# Patient Record
Sex: FEMALE | Race: WHITE | NOT HISPANIC OR LATINO | Employment: FULL TIME | URBAN - METROPOLITAN AREA
[De-identification: names, ages, dates, MRNs, and addresses within clinical notes are randomized per-mention and may not be internally consistent; named-entity substitution may affect disease eponyms.]

---

## 2017-01-30 DIAGNOSIS — Z30.41 ENCOUNTER FOR SURVEILLANCE OF CONTRACEPTIVE PILLS: Primary | ICD-10-CM

## 2017-01-30 NOTE — TELEPHONE ENCOUNTER
norethindrone-ethinyl estradiol-iron (MICROGESTIN FE1.5/30) 1.5-30 MG-MCG per tablet  Last Written Prescription Date: 2/1/2016.  Last Fill Quantity: 84, # refills: 4  Last Office Visit with [facility], [facility] or [facility] prescribing provider: 3/28/2016.       BP Readings from Last 3 Encounters:   04/14/16 127/74   03/28/16 112/64   10/22/15 117/72     Date of last Breast Exam: ?

## 2017-02-01 NOTE — TELEPHONE ENCOUNTER
Junel FE 1.5 MG-30 MCG Tablet      Last Written Prescription Date:  1/4/17  Last Fill Quantity: 84,   # refills: 4  Last Office Visit with G, [facility] or [facility] prescribing provider: 3/28/16  Future Office visit:       Routing refill request to provider for review/approval because:  Drug not active on patient's medication list-pt reported    This was not called in by patient it was sent by pharmacy via fax

## 2017-02-02 RX ORDER — NORETHINDRONE ACETATE AND ETHINYL ESTRADIOL 1.5-30(21)
1 KIT ORAL DAILY
Qty: 84 TABLET | Refills: 4 | Status: CANCELLED | OUTPATIENT
Start: 2017-02-02

## 2017-04-14 ENCOUNTER — OFFICE VISIT (OUTPATIENT)
Dept: OBGYN | Facility: CLINIC | Age: 27
End: 2017-04-14
Payer: COMMERCIAL

## 2017-04-14 VITALS
HEART RATE: 65 BPM | BODY MASS INDEX: 24.24 KG/M2 | SYSTOLIC BLOOD PRESSURE: 110 MMHG | DIASTOLIC BLOOD PRESSURE: 77 MMHG | TEMPERATURE: 98.5 F | WEIGHT: 142 LBS | HEIGHT: 64 IN

## 2017-04-14 DIAGNOSIS — R87.612 PAPANICOLAOU SMEAR OF CERVIX WITH LOW GRADE SQUAMOUS INTRAEPITHELIAL LESION (LGSIL): ICD-10-CM

## 2017-04-14 DIAGNOSIS — Z30.41 ENCOUNTER FOR SURVEILLANCE OF CONTRACEPTIVE PILLS: ICD-10-CM

## 2017-04-14 DIAGNOSIS — Z01.419 ENCOUNTER FOR WELL WOMAN EXAM WITH ROUTINE GYNECOLOGICAL EXAM: Primary | ICD-10-CM

## 2017-04-14 PROCEDURE — 99212 OFFICE O/P EST SF 10 MIN: CPT | Performed by: OBSTETRICS & GYNECOLOGY

## 2017-04-14 RX ORDER — NORETHINDRONE ACETATE AND ETHINYL ESTRADIOL 1.5-30(21)
1 KIT ORAL DAILY
Qty: 84 TABLET | Refills: 4 | Status: SHIPPED | OUTPATIENT
Start: 2017-04-14 | End: 2018-04-11

## 2017-04-14 NOTE — PROGRESS NOTES
Stacey is a 26 year old  female who presents for annual exam.     Menses are regular 3 months on birth control pills. and normal lasting 7 days.  Menses flow: normal and medium.  No LMP recorded. Patient is not currently having periods (Reason: Birth Control).. Using oral contraceptives for contraception.  She is not currently considering pregnancy.  Besides routine health maintenance, she has no other health concerns today .  GYNECOLOGIC HISTORY:  Menarche: 13  Stacey is sexually active with 1 male partner(s) and is currently in monogamous relationship with boyfriend.    History sexually transmitted infections:No STD history  STI testing offered?  Declined  ABEL exposure: Unknown  History of abnormal Pap smear: NO - age 21-29 PAP every 3 years recommended  Family history of breast CA: No  Family history of uterine/ovarian CA: No    Family history of colon CA: Yes (Please explain):  p grandfather    HEALTH MAINTENANCE:  Cholesterol: (No results found for: CHOL History of abnormal lipids: no    Mammo: no . History of abnormal Mammo: Not applicable.  Regular Self Breast Exams: No    Current or Past (Physical, Sexual or Emotional):  No  Do you feel safe in your environment:  Yes    Calcium/Vitamin D intake: source:  dairy Adequate? Yes   Last TDAP? yes Offered today? No    TSH: (  TSH   Date Value Ref Range Status   2016 2.88 0.40 - 4.00 mU/L Final    )  Pap; (  Lab Results   Component Value Date    PAP NIL 2016    PAP NIL 10/22/2015    )    HISTORY:  Obstetric History       T0      TAB0   SAB0   E0   M0   L0         Past Medical History:   Diagnosis Date     Papanicolaou smear of cervix with low grade squamous intraepithelial lesion (LGSIL) 2015    colp - EUGENE I     No past surgical history on file.  Family History   Problem Relation Age of Onset     DIABETES Father      Hyperlipidemia Father      GERD Father      Heartburn     Colon Cancer Paternal Grandfather      Social History  "    Social History     Marital status: Single     Spouse name: N/A     Number of children: N/A     Years of education: N/A     Social History Main Topics     Smoking status: Never Smoker     Smokeless tobacco: Never Used     Alcohol use Yes      Comment: a little     Drug use: No     Sexual activity: Yes     Partners: Male     Birth control/ protection: Pill     Other Topics Concern     None     Social History Narrative       Current Outpatient Prescriptions:      norethindrone-ethinyl estradiol-iron (MICROGESTIN FE1.5/30) 1.5-30 MG-MCG per tablet, Take 1 tablet by mouth daily, Disp: 84 tablet, Rfl: 0     Allergies   Allergen Reactions     Lactose        Past medical, surgical, social and family history were reviewed and updated in EPIC.    ROS:   C:     NEGATIVE for fever, chills, change in weight  I:       NEGATIVE for worrisome rashes, moles or lesions  E:     NEGATIVE for vision changes or irritation  E/M: NEGATIVE for ear, mouth and throat problems  R:     NEGATIVE for significant cough or SOB  CV:   NEGATIVE for chest pain, palpitations or peripheral edema  GI:     NEGATIVE for nausea, abdominal pain, heartburn, or change in bowel habits  :   NEGATIVE for frequency, dysuria, hematuria, vaginal discharge, or irregular bleeding  M:     NEGATIVE for significant arthralgias or myalgia  N:      NEGATIVE for weakness, dizziness or paresthesias  E:      NEGATIVE for temperature intolerance, skin/hair changes  P:      NEGATIVE for changes in mood or affect.    EXAM:  /77  Pulse 65  Temp 98.5  F (36.9  C) (Oral)  Ht 5' 3.5\" (1.613 m)  Wt 142 lb (64.4 kg)  BMI 24.76 kg/m2   BMI: Body mass index is 24.76 kg/(m^2).  Constitutional: healthy, alert and no distress        Psychiatric: Affect appropriate, cooperative,mentation appears normal.     COUNSELING:      reports that she has never smoked. She has never used smokeless tobacco.    Body mass index is 24.76 kg/(m^2).    FRAX Risk " Assessment    ASSESSMENT:  26 year old female with satisfactory annual exam.  Refill ocps.

## 2017-04-14 NOTE — MR AVS SNAPSHOT
After Visit Summary   4/14/2017    Stacey Zavala    MRN: [id number]           Patient Information     Date Of Birth          1990        Visit Information        Provider Department      4/14/2017 4:00 PM Yessica Joseph MD [facility]        Today's Diagnoses     Encounter for well woman exam with routine gynecological exam    -  1    Papanicolaou smear of cervix with low grade squamous intraepithelial lesion (LGSIL)        Encounter for surveillance of contraceptive pills           Follow-ups after your visit        Who to contact     If you have questions or need follow up information about today's clinic visit or your schedule please contact [facility] directly at [phone].  Normal or non-critical lab and imaging results will be communicated to you by MyChart, letter or phone within 4 business days after the clinic has received the results. If you do not hear from us within 7 days, please contact the clinic through Vollyhart or phone. If you have a critical or abnormal lab result, we will notify you by phone as soon as possible.  Submit refill requests through Desktime or call your pharmacy and they will forward the refill request to us. Please allow 3 business days for your refill to be completed.          Additional Information About Your Visit        MyChart Information     Desktime gives you secure access to your electronic health record. If you see a primary care provider, you can also send messages to your care team and make appointments. If you have questions, please call your primary care clinic.  If you do not have a primary care provider, please call [phone] and they will assist you.        Care EveryWhere ID     This is your Care EveryWhere ID. This could be used by other organizations to access your [place] medical records  [id number]        Your Vitals Were     Pulse Temperature Height BMI (Body Mass Index)          65 98.5  F (36.9  C)  "(Oral) 5' 3.5\" (1.613 m) 24.76 kg/m2         Blood Pressure from Last 3 Encounters:   04/14/17 110/77   04/14/16 127/74   03/28/16 112/64    Weight from Last 3 Encounters:   04/14/17 142 lb (64.4 kg)   04/14/16 145 lb 3.2 oz (65.9 kg)   03/28/16 142 lb 9.6 oz (64.7 kg)              Today, you had the following     No orders found for display         Where to get your medicines      These medications were sent to [facility]/pharmacy #[id number] - [place], [place] - [address]  [address], [facility] [place] [facility] [address]     Phone:  [phone]     norethindrone-ethinyl estradiol-iron 1.5-30 MG-MCG per tablet          Primary Care Provider    None Specified       No primary provider on file.        Thank you!     Thank you for choosing [facility]  for your care. Our goal is always to provide you with excellent care. Hearing back from our patients is one way we can continue to improve our services. Please take a few minutes to complete the written survey that you may receive in the mail after your visit with us. Thank you!             Your Updated Medication List - Protect others around you: Learn how to safely use, store and throw away your medicines at www.disposemymeds.org.          This list is accurate as of: 4/14/17  4:25 PM.  Always use your most recent med list.                   Brand Name Dispense Instructions for use    norethindrone-ethinyl estradiol-iron 1.5-30 MG-MCG per tablet    MICROGESTIN FE1.5/30    84 tablet    Take 1 tablet by mouth daily         "

## 2017-04-14 NOTE — NURSING NOTE
"Chief Complaint   Patient presents with     Physical       Initial /77  Pulse 65  Temp 98.5  F (36.9  C) (Oral)  Ht 5' 3.5\" (1.613 m)  Wt 142 lb (64.4 kg)  BMI 24.76 kg/m2 Estimated body mass index is 24.76 kg/(m^2) as calculated from the following:    Height as of this encounter: 5' 3.5\" (1.613 m).    Weight as of this encounter: 142 lb (64.4 kg).  BP completed using cuff size: regular        The following HM Due: pap smear      The following patient reported/Care Every where data was sent to:  P ABSTRACT QUALITY INITIATIVES [[id number]]       n/a             "

## 2018-02-20 ENCOUNTER — OFFICE VISIT (OUTPATIENT)
Dept: DERMATOLOGY | Facility: CLINIC | Age: 28
End: 2018-02-20
Payer: COMMERCIAL

## 2018-02-20 VITALS — OXYGEN SATURATION: 100 % | SYSTOLIC BLOOD PRESSURE: 123 MMHG | DIASTOLIC BLOOD PRESSURE: 64 MMHG | HEART RATE: 81 BPM

## 2018-02-20 DIAGNOSIS — L30.0 NUMMULAR DERMATITIS: Primary | ICD-10-CM

## 2018-02-20 PROCEDURE — 99213 OFFICE O/P EST LOW 20 MIN: CPT | Performed by: PHYSICIAN ASSISTANT

## 2018-02-20 RX ORDER — BETAMETHASONE DIPROPIONATE 0.5 MG/G
OINTMENT, AUGMENTED TOPICAL
Qty: 45 G | Refills: 1 | Status: SHIPPED | OUTPATIENT
Start: 2018-02-20 | End: 2023-03-08

## 2018-02-20 RX ORDER — TRIAMCINOLONE ACETONIDE 1 MG/G
CREAM TOPICAL
Qty: 45 G | Refills: 1 | Status: CANCELLED | OUTPATIENT
Start: 2018-02-20

## 2018-02-20 NOTE — PATIENT INSTRUCTIONS
Take 1 tab of over the counter Zyrtec at bedtime     Apply betamethasone cream to affected areas twice per day for 1-2 weeks, then as needed     Continue to apply daily moisturizer     Follow up if not improving

## 2018-02-20 NOTE — MR AVS SNAPSHOT
"              After Visit Summary   2/20/2018    Stacey Zavala    MRN: [id number]           Patient Information     Date Of Birth          1990        Visit Information        Provider Department      2/20/2018 3:15 PM Danii Antonio PA-C [facility]        Today's Diagnoses     Nummular dermatitis    -  1      Care Instructions    Take 1 tab of over the counter Zyrtec at bedtime     Apply betamethasone cream to affected areas twice per day for 1-2 weeks, then as needed     Continue to apply daily moisturizer     Follow up if not improving             Follow-ups after your visit        Who to contact     If you have questions or need follow up information about today's clinic visit or your schedule please contact [facility] directly at [phone].  Normal or non-critical lab and imaging results will be communicated to you by MyChart, letter or phone within 4 business days after the clinic has received the results. If you do not hear from us within 7 days, please contact the clinic through MyChart or phone. If you have a critical or abnormal lab result, we will notify you by phone as soon as possible.  Submit refill requests through Stilnest or call your pharmacy and they will forward the refill request to us. Please allow 3 business days for your refill to be completed.          Additional Information About Your Visit        MyChart Information     Stilnest lets you send messages to your doctor, view your test results, renew your prescriptions, schedule appointments and more. To sign up, go to [email or web address] . Click on \"Log in\" on the left side of the screen, which will take you to the Welcome page. Then click on \"Sign up Now\" on the right side of the page.     You will be asked to enter the access code listed below, as well as some personal information. Please follow the directions to create your username and password.     Your access code is: " [email or web address]  Expires: 2018  3:32 PM     Your access code will  in 90 days. If you need help or a new code, please call your [place] clinic or [phone].        Care EveryWhere ID     This is your Care EveryWhere ID. This could be used by other organizations to access your [place] medical records  [id number]        Your Vitals Were     Pulse Pulse Oximetry Breastfeeding?             81 100% No          Blood Pressure from Last 3 Encounters:   18 123/64   17 110/77   16 127/74    Weight from Last 3 Encounters:   17 64.4 kg (142 lb)   16 65.9 kg (145 lb 3.2 oz)   16 64.7 kg (142 lb 9.6 oz)              Today, you had the following     No orders found for display       Primary Care Provider    None Specified       No primary provider on file.        Equal Access to Services     [facility] Hospital[facility] : Hadii paul latifo Luli, waaxda luviviadaha, qaybta kaalmada adehanda, venus gamboa . So [facility] [phone].    ATENCIÓN: Si habla español, tiene a spears disposición servicios gratuitos de asistencia lingüística. Llame al [phone].    We comply with applicable federal civil rights laws and [place] laws. We do not discriminate on the basis of race, color, national origin, age, disability, sex, sexual orientation, or gender identity.            Thank you!     Thank you for choosing [facility]  for your care. Our goal is always to provide you with excellent care. Hearing back from our patients is one way we can continue to improve our services. Please take a few minutes to complete the written survey that you may receive in the mail after your visit with us. Thank you!             Your Updated Medication List - Protect others around you: Learn how to safely use, store and throw away your medicines at www.disposemymeds.org.          This list is accurate as of 18  3:32 PM.  Always use your most recent med list.                    Brand Name Dispense Instructions for use Diagnosis    B-12 1000 MCG Caps      Take 1,000 mcg by mouth daily        norethindrone-ethinyl estradiol-iron 1.5-30 MG-MCG per tablet    MICROGESTIN FE1.5/30    84 tablet    Take 1 tablet by mouth daily    Encounter for surveillance of contraceptive pills

## 2018-02-20 NOTE — PROGRESS NOTES
HPI:   Stacey Zavala is a 27 year old female who presents for evaluation of itchy, dry, bumpy rash on arms and legs that comes and goes. Flares up in the evening.   chief complaint  Location: bilateral arms and legs; chest rash has resolved   Condition present for:  Rash for 1 week. Legs have been itchy for 1 month. She has had dry patch on left lower leg for 6 months.   Previous treatments include: Cetaphil lotion, 1% cortisone cream   Additional information: she has had a GI upset for the last two days, seems to be triggered by lactose in the past. She is following a vegan diet. Wonders whether this is related.     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 She has a degree in education.     This document serves as a record of the services and decisions personally performed and made by Danii Antonio, MS, PA-C. It was created on her behalf by Glenny Stallings, a trained medical scribe. The creation of this document is based on the provider's statements to the medical scribe.  Glenny Stallings 3:16 PM February 20, 2018    PHYSICAL EXAM:      Skin exam performed as follows: Type 2 skin. Mood appropriate  Alert and Oriented X 3. Well developed, well nourished in no distress.  General appearance: Normal  Head including face: Normal  Eyes: conjunctiva and lids: Normal  Mouth: Lips, teeth, gums: Normal  Neck: Normal  Chest-breast/axillae: Normal  Back: Normal  Spleen and liver: Normal  Cardiovascular: Exam of peripheral vascular system by observation for swelling, varicosities, edema: Normal  Genitalia: groin, buttocks: Normal  Extremities: digits/nails (clubbing):  Normal  Eccrine and Apocrine glands: Normal  Right upper extremity: Normal  Left upper extremity: Normal  Right lower extremity: Normal  Left lower extremity: Normal  Skin: Scalp and body hair: See below    1. Resolving faint pink plaques on lower legs; few areas of light excoriation on left arm    ASSESSMENT/PLAN:     1. Nummular dermatitis - advised. Itchy rash on arms and legs that comes and goes for 1 week. Currently calm, tends to flare in evening. Has been using Cetaphil lotion and OTC cortisone cream. Consider biopsy in future with recurrence.   --Continue daily emollients   --Start betamethasone ointment BID x 1-2 weeks then PRN  --Consider biopsy if refractory or if worsening          Follow-up: PRN  CC:   Scribed By: Glenny Stallings, Medical Scribe      Danii Antonio, MS, PA-C

## 2018-02-20 NOTE — NURSING NOTE
"Chief Complaint   Patient presents with     Rash     both arms and legs for 1 week        Initial /64  Pulse 81  SpO2 100%  Breastfeeding? No Estimated body mass index is 24.76 kg/(m^2) as calculated from the following:    Height as of 4/14/17: 1.613 m (5' 3.5\").    Weight as of 4/14/17: 64.4 kg (142 lb).  Medication Reconciliation: complete    "

## 2018-04-11 ENCOUNTER — TELEPHONE (OUTPATIENT)
Dept: OBGYN | Facility: CLINIC | Age: 28
End: 2018-04-11

## 2018-04-11 DIAGNOSIS — Z30.41 ENCOUNTER FOR SURVEILLANCE OF CONTRACEPTIVE PILLS: ICD-10-CM

## 2018-04-11 NOTE — TELEPHONE ENCOUNTER
Pharmacy sent refill request for junel fe.  Pt due for AE 4/2018.  Attempted to call pt, but no answer and voicemail was full.  Faxed message to pharmacy.  Latha Roper RN

## 2018-04-18 NOTE — TELEPHONE ENCOUNTER
Patient is scheduled for an appointment on 04/30/2018. Per patient's online web request, can she get a refill to last until her appointment? Please contact patient to discuss.    Emma SIMMS  Central Scheduler

## 2018-04-26 RX ORDER — NORETHINDRONE ACETATE AND ETHINYL ESTRADIOL 1.5-30(21)
1 KIT ORAL DAILY
Qty: 84 TABLET | Refills: 0 | Status: SHIPPED | OUTPATIENT
Start: 2018-04-26 | End: 2018-04-30

## 2018-04-26 NOTE — TELEPHONE ENCOUNTER
TC from the pt stating that her refill was not processed even though she scheduled an AE. Looks like she was scheduled and a note was placed in the chart, but was not routed back to the Triage RN pool. Therefore, refill sent per protocol. Vani Gonzalez RN

## 2018-04-30 ENCOUNTER — OFFICE VISIT (OUTPATIENT)
Dept: OBGYN | Facility: CLINIC | Age: 28
End: 2018-04-30
Payer: COMMERCIAL

## 2018-04-30 VITALS
BODY MASS INDEX: 24.59 KG/M2 | DIASTOLIC BLOOD PRESSURE: 66 MMHG | HEIGHT: 64 IN | SYSTOLIC BLOOD PRESSURE: 96 MMHG | HEART RATE: 72 BPM | WEIGHT: 144 LBS

## 2018-04-30 DIAGNOSIS — R87.612 PAPANICOLAOU SMEAR OF CERVIX WITH LOW GRADE SQUAMOUS INTRAEPITHELIAL LESION (LGSIL): ICD-10-CM

## 2018-04-30 DIAGNOSIS — Z30.41 ENCOUNTER FOR SURVEILLANCE OF CONTRACEPTIVE PILLS: ICD-10-CM

## 2018-04-30 DIAGNOSIS — Z23 NEED FOR TDAP VACCINATION: Primary | ICD-10-CM

## 2018-04-30 PROCEDURE — 90715 TDAP VACCINE 7 YRS/> IM: CPT | Performed by: OBSTETRICS & GYNECOLOGY

## 2018-04-30 PROCEDURE — 99395 PREV VISIT EST AGE 18-39: CPT | Mod: 25 | Performed by: OBSTETRICS & GYNECOLOGY

## 2018-04-30 PROCEDURE — 90471 IMMUNIZATION ADMIN: CPT | Performed by: OBSTETRICS & GYNECOLOGY

## 2018-04-30 RX ORDER — NORETHINDRONE ACETATE AND ETHINYL ESTRADIOL 1.5-30(21)
1 KIT ORAL DAILY
Qty: 84 TABLET | Refills: 3 | Status: SHIPPED | OUTPATIENT
Start: 2018-04-30 | End: 2019-03-28

## 2018-04-30 NOTE — PROGRESS NOTES
Stacey is a 27 year old  female who presents for annual exam.     Menses are regular q 60 days and normal lasting 5 days.  Menses flow: normal.  Patient's last menstrual period was 2018.. Using oral contraceptives for contraception.  She is not currently considering pregnancy.  Besides routine health maintenance, she has no other health concerns today .  GYNECOLOGIC HISTORY:  Menarche: 13  Stacey is sexually active with 1 male partner(s) and is currently in monogamous relationship with  boyfriend.    History sexually transmitted infections:No STD history  STI testing offered?  Declined  ABEL exposure: Unknown  History of abnormal Pap smear: NO - age 21-29 PAP every 3 years recommended  YES - updated in Problem List and Health Maintenance accordingly  Family history of breast CA: No  Family history of uterine/ovarian CA: No    Family history of colon CA: Yes (Please explain): pgf    HEALTH MAINTENANCE:  Cholesterol: (No results found for: CHOL History of abnormal lipids: No    Mammo: no . History of abnormal Mammo: Not applicable.  Regular Self Breast Exams: No    Current or Past (Physical, Sexual or Emotional):  No  Do you feel safe in your environment:  No    Calcium/Vitamin D intake: source:  dairy Adequate? Yes   Last TDAP? uk Offered today? Yes    TSH: (  TSH   Date Value Ref Range Status   2016 2.88 0.40 - 4.00 mU/L Final    )  Pap; (  Lab Results   Component Value Date    PAP NIL 2016    PAP NIL 10/22/2015    )    HISTORY:  Obstetric History       T0      L0     SAB0   TAB0   Ectopic0   Multiple0   Live Births0         Past Medical History:   Diagnosis Date     Papanicolaou smear of cervix with low grade squamous intraepithelial lesion (LGSIL) 2015    colp - EUGENE I     No past surgical history on file.  Family History   Problem Relation Age of Onset     DIABETES Father      Hyperlipidemia Father      GERD Father      Heartburn     Colon Cancer Paternal Grandfather       "Skin Cancer Paternal Aunt      Social History     Social History     Marital status: Single     Spouse name: N/A     Number of children: N/A     Years of education: N/A     Social History Main Topics     Smoking status: Never Smoker     Smokeless tobacco: Never Used     Alcohol use Yes      Comment: a little     Drug use: No     Sexual activity: Yes     Partners: Male     Birth control/ protection: Pill     Other Topics Concern     None     Social History Narrative       Current Outpatient Prescriptions:      augmented betamethasone dipropionate (DIPROLENE-AF) 0.05 % ointment, Apply to AA x 1-2 weeks then PRN, Disp: 45 g, Rfl: 1     Cyanocobalamin (B-12) 1000 MCG CAPS, Take 1,000 mcg by mouth daily, Disp: , Rfl:      norethindrone-ethinyl estradiol-iron (MICROGESTIN FE1.5/30) 1.5-30 MG-MCG per tablet, Take 1 tablet by mouth daily, Disp: 84 tablet, Rfl: 0     Allergies   Allergen Reactions     Lactose        Past medical, surgical, social and family history were reviewed and updated in EPIC.    ROS:   C:     NEGATIVE for fever, chills, change in weight  I:       NEGATIVE for worrisome rashes, moles or lesions  E:     NEGATIVE for vision changes or irritation  E/M: NEGATIVE for ear, mouth and throat problems  R:     NEGATIVE for significant cough or SOB  CV:   NEGATIVE for chest pain, palpitations or peripheral edema  GI:     NEGATIVE for nausea, abdominal pain, heartburn, or change in bowel habits  :   NEGATIVE for frequency, dysuria, hematuria, vaginal discharge, or irregular bleeding  M:     NEGATIVE for significant arthralgias or myalgia  N:      NEGATIVE for weakness, dizziness or paresthesias  E:      NEGATIVE for temperature intolerance, skin/hair changes  P:      NEGATIVE for changes in mood or affect.    EXAM:  BP 96/66  Pulse 72  Ht 5' 3.5\" (1.613 m)  Wt 144 lb (65.3 kg)  LMP 04/20/2018  BMI 25.11 kg/m2   BMI: Body mass index is 25.11 kg/(m^2).  Constitutional: healthy, alert and no distress  Head: " Normocephalic. No masses, lesions, tenderness or abnormalities  Neck: Neck supple. Trachea midline. No adenopathy. Thyroid symmetric, normal size.   Cardiovascular: RRR.   Respiratory: Negative.   Breast: No nodularity, asymmetry or nipple discharge bilaterally.  Gastrointestinal: Abdomen soft, non-tender, non-distended. No masses, organomegaly.  :  Deferred.  Pap not due and GC/chlamydia screen declined. No complaints.   Musculoskeletal: extremities normal  Skin: no suspicious lesions or rashes  Psychiatric: Affect appropriate, cooperative,mentation appears normal.     COUNSELING:   Reviewed preventive health counseling, as reflected in patient instructions       Healthy diet/nutrition   reports that she has never smoked. She has never used smokeless tobacco.    Body mass index is 25.11 kg/(m^2).    FRAX Risk Assessment    ASSESSMENT:  27 year old female with satisfactory annual exam  (Z23) Need for Tdap vaccination  (primary encounter diagnosis)    Plan: TDAP VACCINE (ADACEL), VACCINE ADMINISTRATION,         INITIAL              (Z30.41) Encounter for surveillance of contraceptive pills   Plan: norethindrone-ethinyl estradiol-iron         (MICROGESTIN FE1.5/30) 1.5-30 MG-MCG per tablet

## 2018-04-30 NOTE — NURSING NOTE
"Chief Complaint   Patient presents with     Physical       Initial BP 96/66  Pulse 72  Ht 5' 3.5\" (1.613 m)  Wt 144 lb (65.3 kg)  LMP 2018  BMI 25.11 kg/m2 Estimated body mass index is 25.11 kg/(m^2) as calculated from the following:    Height as of this encounter: 5' 3.5\" (1.613 m).    Weight as of this encounter: 144 lb (65.3 kg).  BP completed using cuff size: regular        The following HM Due: NONE      The following patient reported/Care Every where data was sent to:  P ABSTRACT QUALITY INITIATIVES [[id number]]        N/a      Kate Goldberg MA              "

## 2018-04-30 NOTE — MR AVS SNAPSHOT
"              After Visit Summary   2018    Stacey Zavala    MRN: [id number]           Patient Information     Date Of Birth          1990        Visit Information        Provider Department      2018 4:00 PM Yessica Joseph MD [facility] [place] [facility]        Today's Diagnoses     Need for Tdap vaccination    -  1    Papanicolaou smear of cervix with low grade squamous intraepithelial lesion (LGSIL)        Encounter for surveillance of contraceptive pills           Follow-ups after your visit        Who to contact     If you have questions or need follow up information about today's clinic visit or your schedule please contact [facility] directly at [phone].  Normal or non-critical lab and imaging results will be communicated to you by MyChart, letter or phone within 4 business days after the clinic has received the results. If you do not hear from us within 7 days, please contact the clinic through MyChart or phone. If you have a critical or abnormal lab result, we will notify you by phone as soon as possible.  Submit refill requests through [employer]! or call your pharmacy and they will forward the refill request to us. Please allow 3 business days for your refill to be completed.          Additional Information About Your Visit        MyChart Information     [employer]! lets you send messages to your doctor, view your test results, renew your prescriptions, schedule appointments and more. To sign up, go to [email or web address] . Click on \"Log in\" on the left side of the screen, which will take you to the Welcome page. Then click on \"Sign up Now\" on the right side of the page.     You will be asked to enter the access code listed below, as well as some personal information. Please follow the directions to create your username and password.     Your access code is: [email or web address]  Expires: 2018  4:32 PM     Your access code will  in 90 days. If you need help or a new " "code, please call your [place] clinic or [phone].        Care EveryWhere ID     This is your Care EveryWhere ID. This could be used by other organizations to access your [facility] [place] medical records  [id number]        Your Vitals Were     Pulse Height Last Period BMI (Body Mass Index)          72 5' 3.5\" (1.613 m) 04/20/2018 25.11 kg/m2         Blood Pressure from Last 3 Encounters:   04/30/18 96/66   02/20/18 123/64   04/14/17 110/77    Weight from Last 3 Encounters:   04/30/18 144 lb (65.3 kg)   04/14/17 142 lb (64.4 kg)   04/14/16 145 lb 3.2 oz (65.9 kg)              We Performed the Following     TDAP VACCINE (ADACEL)     VACCINE ADMINISTRATION, INITIAL          Where to get your medicines      These medications were sent to [facility]/pharmacy #[id number] - [place], [place] - [address] [place], [place] [address] [facility] [address]     Phone:  [phone]     norethindrone-ethinyl estradiol-iron 1.5-30 MG-MCG per tablet          Primary Care Provider Fax #    Physician No Ref-Primary [phone]       No address on file        Equal Access to Services     KERWIN ALMANZAR : Leonard latifo Sobishop, waaxda luviviadaha, qaybta kaalmada adegopiyada, venus davis. So [facility] [phone].    ATENCIÓN: Si habla español, tiene a spears disposición servicios gratuitos de asistencia lingüística. Llame al [phone].    We comply with applicable federal civil rights laws and [place] laws. We do not discriminate on the basis of race, color, national origin, age, disability, sex, sexual orientation, or gender identity.            Thank you!     Thank you for choosing [facility]  for your care. Our goal is always to provide you with excellent care. Hearing back from our patients is one way we can continue to improve our services. Please take a few minutes to complete the written survey that you may receive in the mail after your visit with us. Thank you!             Your Updated " Medication List - Protect others around you: Learn how to safely use, store and throw away your medicines at www.disposemymeds.org.          This list is accurate as of 4/30/18  4:21 PM.  Always use your most recent med list.                   Brand Name Dispense Instructions for use Diagnosis    augmented betamethasone dipropionate 0.05 % ointment    DIPROLENE-AF    45 g    Apply to AA x 1-2 weeks then PRN    Nummular dermatitis       B-12 1000 MCG Caps      Take 1,000 mcg by mouth daily        norethindrone-ethinyl estradiol-iron 1.5-30 MG-MCG per tablet    MICROGESTIN FE1.5/30    84 tablet    Take 1 tablet by mouth daily    Encounter for surveillance of contraceptive pills

## 2018-07-10 ENCOUNTER — HEALTH MAINTENANCE LETTER (OUTPATIENT)
Age: 28
End: 2018-07-10

## 2019-03-28 DIAGNOSIS — Z30.41 ENCOUNTER FOR SURVEILLANCE OF CONTRACEPTIVE PILLS: ICD-10-CM

## 2019-03-28 RX ORDER — NORETHINDRONE ACETATE AND ETHINYL ESTRADIOL 1.5-30(21)
1 KIT ORAL DAILY
Qty: 84 TABLET | Refills: 0 | Status: SHIPPED | OUTPATIENT
Start: 2019-03-28 | End: 2019-06-26

## 2019-03-28 NOTE — TELEPHONE ENCOUNTER
Pharmacy sent refill request for OCP.  Pt due for AE 4/30/19.  Refill sent per protocol with a note that she should be seen in May 2019.  Latha Roper RN

## 2019-06-24 ENCOUNTER — TELEPHONE (OUTPATIENT)
Dept: OBGYN | Facility: CLINIC | Age: 29
End: 2019-06-24

## 2019-06-24 DIAGNOSIS — Z30.41 ENCOUNTER FOR SURVEILLANCE OF CONTRACEPTIVE PILLS: ICD-10-CM

## 2019-06-24 RX ORDER — NORETHINDRONE ACETATE AND ETHINYL ESTRADIOL 1.5-30(21)
1 KIT ORAL DAILY
Qty: 84 TABLET | Refills: 0 | Status: CANCELLED | OUTPATIENT
Start: 2019-06-24

## 2019-06-24 NOTE — TELEPHONE ENCOUNTER
Pending Prescriptions:                       Disp   Refills    norethindrone-ethinyl estradiol-iron (MORRIS*84 tab*0            Sig: Take 1 tablet by mouth daily      Pharmacy sent refill request. Last OV was 4/30/18. Due for AE. TC to patient. Left message to call clinic to schedule. Once pt schedule, refill can be sent through.   Hailey Oseguera RN-BSN

## 2019-06-26 ENCOUNTER — OFFICE VISIT (OUTPATIENT)
Dept: MIDWIFE SERVICES | Facility: CLINIC | Age: 29
End: 2019-06-26
Payer: COMMERCIAL

## 2019-06-26 VITALS
DIASTOLIC BLOOD PRESSURE: 73 MMHG | TEMPERATURE: 98.9 F | SYSTOLIC BLOOD PRESSURE: 104 MMHG | BODY MASS INDEX: 25.81 KG/M2 | WEIGHT: 148 LBS | HEART RATE: 65 BPM

## 2019-06-26 DIAGNOSIS — Z12.4 CERVICAL CANCER SCREENING: ICD-10-CM

## 2019-06-26 DIAGNOSIS — Z30.41 ENCOUNTER FOR SURVEILLANCE OF CONTRACEPTIVE PILLS: ICD-10-CM

## 2019-06-26 DIAGNOSIS — Z00.00 ANNUAL PHYSICAL EXAM: Primary | ICD-10-CM

## 2019-06-26 PROCEDURE — G0145 SCR C/V CYTO,THINLAYER,RESCR: HCPCS | Performed by: ADVANCED PRACTICE MIDWIFE

## 2019-06-26 PROCEDURE — 99395 PREV VISIT EST AGE 18-39: CPT | Performed by: ADVANCED PRACTICE MIDWIFE

## 2019-06-26 RX ORDER — NORETHINDRONE ACETATE AND ETHINYL ESTRADIOL 1.5-30(21)
1 KIT ORAL DAILY
Qty: 84 TABLET | Refills: 4 | Status: SHIPPED | OUTPATIENT
Start: 2019-06-26 | End: 2019-11-21

## 2019-06-26 NOTE — PROGRESS NOTES
Stacey is a 29 year old  female who presents for annual exam.     Menses are every two months and normal lasting 5-7  days.  Menses flow: normal.  Patient's last menstrual period was 2019.. Using oral contraceptives for contraception.  She is not currently considering pregnancy.  Besides routine health maintenance, she has no other health concerns today .  GYNECOLOGIC HISTORY:  Menarche:   Age at first intercourse: 17 Number of lifetime partners: less than 6  Stacey is sexually active with 1 male partner(s) and is currently in monogamous relationship with boyfriend.    History sexually transmitted infections:No STD history  STI testing offered?  Declined  ABEL exposure: No  History of abnormal Pap smear: NO - age 21-29 PAP every 3 years recommended  Family history of breast CA: No  Family history of uterine/ovarian CA: No    Family history of colon CA: Yes (Please explain): Paternal Grandfather    HEALTH MAINTENANCE:  Cholesterol: (No results found for: CHOL History of abnormal lipids: No  Mammo: 0 . History of abnormal Mammo: YES, No, Not applicable.  Regular Self Breast Exams: No  Calcium/Vitamin D intake: source:  food Adequate? Yes  TSH: (  TSH   Date Value Ref Range Status   2016 2.88 0.40 - 4.00 mU/L Final    )  Pap; (  Lab Results   Component Value Date    PAP NIL 2016    PAP NIL 10/22/2015    )    HISTORY:  OB History    Para Term  AB Living   0 0 0 0 0 0   SAB TAB Ectopic Multiple Live Births   0 0 0 0 0     Past Medical History:   Diagnosis Date     Papanicolaou smear of cervix with low grade squamous intraepithelial lesion (LGSIL) 2015    colp - EUGENE I     History reviewed. No pertinent surgical history.  Family History   Problem Relation Age of Onset     Diabetes Father      Hyperlipidemia Father      GERD Father         Heartburn     Colon Cancer Paternal Grandfather      Skin Cancer Paternal Aunt      Social History     Socioeconomic History     Marital status:  Single     Spouse name: None     Number of children: None     Years of education: None     Highest education level: None   Occupational History     None   Social Needs     Financial resource strain: None     Food insecurity:     Worry: None     Inability: None     Transportation needs:     Medical: None     Non-medical: None   Tobacco Use     Smoking status: Never Smoker     Smokeless tobacco: Never Used   Substance and Sexual Activity     Alcohol use: Yes     Comment: a little     Drug use: No     Sexual activity: Yes     Partners: Male     Birth control/protection: Pill   Lifestyle     Physical activity:     Days per week: None     Minutes per session: None     Stress: None   Relationships     Social connections:     Talks on phone: None     Gets together: None     Attends Mosque service: None     Active member of club or organization: None     Attends meetings of clubs or organizations: None     Relationship status: None     Intimate partner violence:     Fear of current or ex partner: None     Emotionally abused: None     Physically abused: None     Forced sexual activity: None   Other Topics Concern     Parent/sibling w/ CABG, MI or angioplasty before 65F 55M? Not Asked   Social History Narrative     None       Current Outpatient Medications:      augmented betamethasone dipropionate (DIPROLENE-AF) 0.05 % ointment, Apply to AA x 1-2 weeks then PRN, Disp: 45 g, Rfl: 1     Cyanocobalamin (B-12) 1000 MCG CAPS, Take 1,000 mcg by mouth daily, Disp: , Rfl:      norethindrone-ethinyl estradiol-iron (MICROGESTIN FE1.5/30) 1.5-30 MG-MCG tablet, Take 1 tablet by mouth daily Schedule physical for future refills, Disp: 84 tablet, Rfl: 0     Allergies   Allergen Reactions     Lactose        Past medical, surgical, social and family history were reviewed and updated in EPIC.    ROS:   C:     NEGATIVE for fever, chills, change in weight  I:       NEGATIVE for worrisome rashes, moles or lesions  E:     NEGATIVE for vision  changes or irritation  E/M: NEGATIVE for ear, mouth and throat problems  R:     NEGATIVE for significant cough or SOB  CV:   NEGATIVE for chest pain, palpitations or peripheral edema  GI:     NEGATIVE for nausea, abdominal pain, heartburn, or change in bowel habits  :   NEGATIVE for frequency, dysuria, hematuria, vaginal discharge, or irregular bleeding  M:     NEGATIVE for significant arthralgias or myalgia  N:      NEGATIVE for weakness, dizziness or paresthesias  E:      NEGATIVE for temperature intolerance, skin/hair changes  P:      NEGATIVE for changes in mood or affect.    EXAM:  /73 (BP Location: Left arm, Patient Position: Sitting, Cuff Size: Adult Regular)   Pulse 65   Temp 98.9  F (37.2  C) (Oral)   Wt 67.1 kg (148 lb)   LMP 04/27/2019   BMI 25.81 kg/m     BMI: Body mass index is 25.81 kg/m .  Constitutional: healthy, alert and no distress  Head: Normocephalic. No masses, lesions, tenderness or abnormalities  Neck: Neck supple. Trachea midline. No adenopathy. Thyroid symmetric, normal size.   Cardiovascular: RRR.   Respiratory: Negative.   Breast: Deferred  Gastrointestinal: Abdomen soft, non-tender, non-distended. No masses, organomegaly.  :  Vulva:  No external lesions, normal female hair distribution, no inguinal adenopathy.    Urethra:  Midline, non-tender, well supported, no discharge  Vagina:  Moist, pink, no abnormal discharge, no lesions  Uterus:  Normal size, rv , non-tender, freely mobile  Ovaries:  No masses appreciated, non-tender, mobile  Rectal Exam: deferred  Musculoskeletal: extremities normal  Skin: no suspicious lesions or rashes  Psychiatric: Affect appropriate, cooperative,mentation appears normal.     COUNSELING:   Reviewed preventive health counseling, as reflected in patient instructions       Contraception   reports that she has never smoked. She has never used smokeless tobacco.    Body mass index is 25.81 kg/m .    FRAX Risk Assessment    ASSESSMENT:  29 year old  female with satisfactory annual exam  (Z00.00) Annual physical exam  (primary encounter diagnosis)  Comment:   Plan: Pap imaged thin layer screen reflex to HPV if         ASCUS - recommend age 25 - 29            (Z30.41) Encounter for surveillance of contraceptive pills  Comment:   Plan: norethindrone-ethinyl estradiol-iron         (MICROGESTIN FE1.5/30) 1.5-30 MG-MCG tablet            (Z12.4) Cervical cancer screening  Comment:   Plan: Pap imaged thin layer screen reflex to HPV if         ASCUS - recommend age 25 - 29

## 2019-06-26 NOTE — NURSING NOTE
"Chief Complaint   Patient presents with     Physical       Initial /73 (BP Location: Left arm, Patient Position: Sitting, Cuff Size: Adult Regular)   Pulse 65   Temp 98.9  F (37.2  C) (Oral)   Wt 67.1 kg (148 lb)   LMP 2019   BMI 25.81 kg/m   Estimated body mass index is 25.81 kg/m  as calculated from the following:    Height as of 18: 1.613 m (5' 3.5\").    Weight as of this encounter: 67.1 kg (148 lb).  BP completed using cuff size: regular    Questioned patient about current smoking habits.  Pt. has never smoked.          The following HM Due: pap smear      The following patient reported/Care Every where data was sent to:  P ABSTRACT QUALITY INITIATIVES [68000]  AILYN Riddle           "

## 2019-06-26 NOTE — LETTER
July 3, 2019      Stacey Zavala  [address]  [place] [address]    Dear ,      I am happy to inform you that your recent cervical cancer screening test (PAP smear) was normal.      Preventative screenings such as this help to ensure your health for years to come. You should repeat a pap smear in 3 years, unless otherwise directed.      You will still need to return to the clinic every year for your annual exam and other preventive tests.     If you have additional questions regarding this result, please call our registered nurse, Larisa at [phone].      Sincerely,      RADHA Hedrick CNM/[facility]

## 2019-07-02 LAB
COPATH REPORT: NORMAL
PAP: NORMAL

## 2019-11-21 DIAGNOSIS — Z30.41 ENCOUNTER FOR SURVEILLANCE OF CONTRACEPTIVE PILLS: ICD-10-CM

## 2019-11-21 DIAGNOSIS — L30.0 NUMMULAR DERMATITIS: ICD-10-CM

## 2019-11-21 RX ORDER — BETAMETHASONE DIPROPIONATE 0.5 MG/G
OINTMENT, AUGMENTED TOPICAL
Qty: 45 G | Refills: 1 | Status: CANCELLED | OUTPATIENT
Start: 2019-11-21

## 2019-11-21 RX ORDER — NORETHINDRONE ACETATE AND ETHINYL ESTRADIOL 1.5-30(21)
1 KIT ORAL DAILY
Qty: 84 TABLET | Refills: 2 | Status: SHIPPED | OUTPATIENT
Start: 2019-11-21 | End: 2020-07-03

## 2020-03-10 ENCOUNTER — HEALTH MAINTENANCE LETTER (OUTPATIENT)
Age: 30
End: 2020-03-10

## 2020-07-03 DIAGNOSIS — Z30.41 ENCOUNTER FOR SURVEILLANCE OF CONTRACEPTIVE PILLS: ICD-10-CM

## 2020-07-03 RX ORDER — NORETHINDRONE ACETATE AND ETHINYL ESTRADIOL AND FERROUS FUMARATE 1.5-30(21)
KIT ORAL
Qty: 84 TABLET | Refills: 0 | Status: SHIPPED | OUTPATIENT
Start: 2020-07-03 | End: 2023-03-08

## 2020-07-03 NOTE — TELEPHONE ENCOUNTER
Refill request sent for OCP's. Last AE 06/26/2019. Refill sent to pharmacy with note for patient to call in September to schedule an appointment. (currently a delay due to COVID). Hailey Arndt RN

## 2020-09-26 DIAGNOSIS — Z30.41 ENCOUNTER FOR SURVEILLANCE OF CONTRACEPTIVE PILLS: ICD-10-CM

## 2020-09-28 NOTE — TELEPHONE ENCOUNTER
Patient requesting a refill of her birth control pills. Last AE 06/2019. inSparq message sent back to patient. We will need an appointment scheduled prior to being able to send refill. Medication is pending. Hailey Arndt RN

## 2020-10-06 RX ORDER — NORETHINDRONE ACETATE AND ETHINYL ESTRADIOL AND FERROUS FUMARATE 1.5-30(21)
KIT ORAL
Qty: 84 TABLET | Refills: 0 | OUTPATIENT
Start: 2020-10-06

## 2020-12-27 ENCOUNTER — HEALTH MAINTENANCE LETTER (OUTPATIENT)
Age: 30
End: 2020-12-27

## 2021-10-04 ENCOUNTER — HEALTH MAINTENANCE LETTER (OUTPATIENT)
Age: 31
End: 2021-10-04

## 2022-01-23 ENCOUNTER — HEALTH MAINTENANCE LETTER (OUTPATIENT)
Age: 32
End: 2022-01-23

## 2022-09-11 ENCOUNTER — HEALTH MAINTENANCE LETTER (OUTPATIENT)
Age: 32
End: 2022-09-11

## 2023-03-07 ASSESSMENT — ENCOUNTER SYMPTOMS
CHILLS: 0
JOINT SWELLING: 0
HEMATOCHEZIA: 0
HEADACHES: 1
DYSURIA: 0
CONSTIPATION: 0
NAUSEA: 0
NERVOUS/ANXIOUS: 0
HEMATURIA: 0
DIARRHEA: 0
PALPITATIONS: 0
ARTHRALGIAS: 0
FREQUENCY: 0
COUGH: 0
FEVER: 0
EYE PAIN: 0
DIZZINESS: 0
MYALGIAS: 0
WEAKNESS: 0
SORE THROAT: 0
ABDOMINAL PAIN: 0
BREAST MASS: 0
HEARTBURN: 0
SHORTNESS OF BREATH: 0
PARESTHESIAS: 0

## 2023-03-08 ENCOUNTER — OFFICE VISIT (OUTPATIENT)
Dept: FAMILY MEDICINE | Facility: CLINIC | Age: 33
End: 2023-03-08
Payer: COMMERCIAL

## 2023-03-08 VITALS
HEIGHT: 65 IN | SYSTOLIC BLOOD PRESSURE: 118 MMHG | WEIGHT: 157 LBS | BODY MASS INDEX: 26.16 KG/M2 | TEMPERATURE: 98.9 F | DIASTOLIC BLOOD PRESSURE: 80 MMHG | OXYGEN SATURATION: 99 % | HEART RATE: 67 BPM

## 2023-03-08 DIAGNOSIS — Z12.4 CERVICAL CANCER SCREENING: ICD-10-CM

## 2023-03-08 DIAGNOSIS — Z82.69 FAMILY HISTORY OF ANKYLOSING SPONDYLITIS: ICD-10-CM

## 2023-03-08 DIAGNOSIS — Z13.220 SCREENING CHOLESTEROL LEVEL: ICD-10-CM

## 2023-03-08 DIAGNOSIS — Z13.1 SCREENING FOR DIABETES MELLITUS: ICD-10-CM

## 2023-03-08 DIAGNOSIS — Z30.41 ENCOUNTER FOR SURVEILLANCE OF CONTRACEPTIVE PILLS: ICD-10-CM

## 2023-03-08 DIAGNOSIS — Z00.00 ROUTINE GENERAL MEDICAL EXAMINATION AT A HEALTH CARE FACILITY: Primary | ICD-10-CM

## 2023-03-08 DIAGNOSIS — G43.009 MIGRAINE WITHOUT AURA AND WITHOUT STATUS MIGRAINOSUS, NOT INTRACTABLE: ICD-10-CM

## 2023-03-08 LAB
CHOLEST SERPL-MCNC: 163 MG/DL
FASTING STATUS PATIENT QL REPORTED: YES
GLUCOSE SERPL-MCNC: 76 MG/DL (ref 70–99)
HDLC SERPL-MCNC: 43 MG/DL
LDLC SERPL CALC-MCNC: 92 MG/DL
NONHDLC SERPL-MCNC: 120 MG/DL
TRIGL SERPL-MCNC: 138 MG/DL

## 2023-03-08 PROCEDURE — G0145 SCR C/V CYTO,THINLAYER,RESCR: HCPCS | Performed by: STUDENT IN AN ORGANIZED HEALTH CARE EDUCATION/TRAINING PROGRAM

## 2023-03-08 PROCEDURE — 99385 PREV VISIT NEW AGE 18-39: CPT | Performed by: STUDENT IN AN ORGANIZED HEALTH CARE EDUCATION/TRAINING PROGRAM

## 2023-03-08 PROCEDURE — 80061 LIPID PANEL: CPT | Performed by: STUDENT IN AN ORGANIZED HEALTH CARE EDUCATION/TRAINING PROGRAM

## 2023-03-08 PROCEDURE — 82947 ASSAY GLUCOSE BLOOD QUANT: CPT | Performed by: STUDENT IN AN ORGANIZED HEALTH CARE EDUCATION/TRAINING PROGRAM

## 2023-03-08 PROCEDURE — 36415 COLL VENOUS BLD VENIPUNCTURE: CPT | Performed by: STUDENT IN AN ORGANIZED HEALTH CARE EDUCATION/TRAINING PROGRAM

## 2023-03-08 PROCEDURE — 87624 HPV HI-RISK TYP POOLED RSLT: CPT | Performed by: STUDENT IN AN ORGANIZED HEALTH CARE EDUCATION/TRAINING PROGRAM

## 2023-03-08 RX ORDER — NORETHINDRONE ACETATE AND ETHINYL ESTRADIOL 1.5-30(21)
1 KIT ORAL DAILY
Qty: 84 TABLET | Refills: 3 | Status: SHIPPED | OUTPATIENT
Start: 2023-03-08 | End: 2024-01-09

## 2023-03-08 ASSESSMENT — ENCOUNTER SYMPTOMS
CONSTIPATION: 0
BREAST MASS: 0
HEADACHES: 1
PARESTHESIAS: 0
CHILLS: 0
ABDOMINAL PAIN: 0
FEVER: 0
DIZZINESS: 0
PALPITATIONS: 0
COUGH: 0
EYE PAIN: 0
FREQUENCY: 0
DYSURIA: 0
HEMATURIA: 0
HEARTBURN: 0
HEMATOCHEZIA: 0
JOINT SWELLING: 0
ARTHRALGIAS: 0
DIARRHEA: 0
WEAKNESS: 0
SORE THROAT: 0
MYALGIAS: 0
SHORTNESS OF BREATH: 0
NERVOUS/ANXIOUS: 0
NAUSEA: 0

## 2023-03-10 LAB
BKR LAB AP GYN ADEQUACY: NORMAL
BKR LAB AP GYN INTERPRETATION: NORMAL
BKR LAB AP HPV REFLEX: NORMAL
BKR LAB AP LMP: NORMAL
BKR LAB AP PREVIOUS ABNL DX: NORMAL
BKR LAB AP PREVIOUS ABNORMAL: NORMAL
PATH REPORT.COMMENTS IMP SPEC: NORMAL
PATH REPORT.COMMENTS IMP SPEC: NORMAL
PATH REPORT.RELEVANT HX SPEC: NORMAL

## 2023-03-14 LAB
HUMAN PAPILLOMA VIRUS 16 DNA: NEGATIVE
HUMAN PAPILLOMA VIRUS 18 DNA: NEGATIVE
HUMAN PAPILLOMA VIRUS FINAL DIAGNOSIS: NORMAL
HUMAN PAPILLOMA VIRUS OTHER HR: NEGATIVE

## 2023-03-31 ENCOUNTER — NURSE TRIAGE (OUTPATIENT)
Dept: INTERNAL MEDICINE | Facility: CLINIC | Age: 33
End: 2023-03-31
Payer: COMMERCIAL

## 2023-03-31 ENCOUNTER — APPOINTMENT (OUTPATIENT)
Dept: ULTRASOUND IMAGING | Facility: CLINIC | Age: 33
End: 2023-03-31
Attending: EMERGENCY MEDICINE
Payer: COMMERCIAL

## 2023-03-31 ENCOUNTER — HOSPITAL ENCOUNTER (EMERGENCY)
Facility: CLINIC | Age: 33
Discharge: HOME | End: 2023-03-31
Attending: EMERGENCY MEDICINE | Admitting: EMERGENCY MEDICINE
Payer: COMMERCIAL

## 2023-03-31 VITALS
BODY MASS INDEX: 27.31 KG/M2 | TEMPERATURE: 98.1 F | RESPIRATION RATE: 16 BRPM | HEART RATE: 58 BPM | WEIGHT: 160 LBS | SYSTOLIC BLOOD PRESSURE: 106 MMHG | DIASTOLIC BLOOD PRESSURE: 74 MMHG | OXYGEN SATURATION: 100 % | HEIGHT: 64 IN

## 2023-03-31 DIAGNOSIS — M79.89 CALF SWELLING: ICD-10-CM

## 2023-03-31 PROCEDURE — 99284 EMERGENCY DEPT VISIT MOD MDM: CPT | Performed by: EMERGENCY MEDICINE

## 2023-03-31 PROCEDURE — 93971 EXTREMITY STUDY: CPT | Mod: LT

## 2023-03-31 PROCEDURE — 99284 EMERGENCY DEPT VISIT MOD MDM: CPT | Mod: 25 | Performed by: EMERGENCY MEDICINE

## 2023-03-31 ASSESSMENT — ACTIVITIES OF DAILY LIVING (ADL): ADLS_ACUITY_SCORE: 35

## 2023-03-31 NOTE — ED TRIAGE NOTES
Patient reports Left calf fells tight. Patient called the nurse line & was told to anamaria to the ED.      Triage Assessment     Row Name 03/31/23 7481       Triage Assessment (Adult)    Airway WDL WDL       Respiratory WDL    Respiratory WDL WDL       Skin Circulation/Temperature WDL    Skin Circulation/Temperature WDL WDL       Cardiac WDL    Cardiac WDL WDL       Peripheral/Neurovascular WDL    Peripheral Neurovascular WDL WDL

## 2023-03-31 NOTE — DISCHARGE INSTRUCTIONS
Your ultrasound today was normal.  There was no clots.Follow-up with your primary care provider.  Return to the emergency department as needed for any new or worsening symptoms.

## 2023-03-31 NOTE — ED NOTES
Pt states left leg/calf and behind knee have been swollen for 24 hours.  It has improved but was told by nurse to come to be seen in case of a blood clot.

## 2023-03-31 NOTE — ED PROVIDER NOTES
[facility] [place] EMERGENCY DEPARTMENT ([facility])    3/31/23      ED PROVIDER NOTE  Hallway AC    History     Chief Complaint   Patient presents with     Leg Swelling     Left leg Swelling since tu      HPI  Stacey Zavala is a 32 year old female who presents with left lower extremity swelling over the past couple of days.  She called into nurse triage line reporting leg Swelling that started yesterday with coldness in her left leg as well as a pinching/tingling sensation in her calf. No discoloration in either leg.  No personal history of clotting disorder but did have a cousin who  from a blood clot of unknown etiology.  Nurse triage line advised patient to come to the ER for further evaluation.     Past Medical History  Past Medical History:   Diagnosis Date     Papanicolaou smear of cervix with low grade squamous intraepithelial lesion (LGSIL) 2015    colp - EUGENE I     Past Surgical History:   Procedure Laterality Date     BIOPSY  2015    Cervix     EYE SURGERY  2013    Minor removal of corneal deposit, twice, in right eye     norethindrone-ethinyl estradiol-iron (AUROVELA FE 1.5/30) 1.5-30 MG-MCG tablet  Cyanocobalamin (B-12) 1000 MCG CAPS      Allergies   Allergen Reactions     Lactose      Family History  Family History   Problem Relation Age of Onset     Diabetes Father      Hyperlipidemia Father      GERD Father         Heartburn     Obesity Father      Depression Maternal Grandmother      Colon Cancer Paternal Grandfather      Skin Cancer Paternal Aunt      Depression Brother      Anxiety Disorder Brother      Genetic Disorder Brother         Tested positive for HLA-B27. Doctors suspect  Ankylosing Spondylitis, or something similar     Social History   Social History     Tobacco Use     Smoking status: Never     Smokeless tobacco: Never   Vaping Use     Vaping Use: Never used   Substance Use Topics     Alcohol use: Not Currently     Comment: Minimal     Drug use: Never         A  "medically appropriate review of systems was performed with pertinent positives and negatives noted in the HPI, and all other systems negative.    Physical Exam   BP: 106/74  Pulse: 58  Temp: 98.1  F (36.7  C)  Resp: 16  Height: 162.6 cm (5' 4\")  Weight: 72.6 kg (160 lb)  SpO2: 100 %  Physical Exam  Vitals and nursing note reviewed.   Constitutional:       General: She is not in acute distress.     Appearance: She is well-developed. She is not ill-appearing.   HENT:      Head: Normocephalic and atraumatic.      Right Ear: External ear normal.      Left Ear: External ear normal.      Nose: Nose normal.   Eyes:      Extraocular Movements: Extraocular movements intact.      Conjunctiva/sclera: Conjunctivae normal.   Pulmonary:      Effort: Pulmonary effort is normal. No respiratory distress.   Abdominal:      General: There is no distension.   Musculoskeletal:         General: No swelling or deformity.      Cervical back: Normal range of motion and neck supple.      Comments: Mild tenderness to left calf   Skin:     General: Skin is warm and dry.   Neurological:      Mental Status: Mental status is at baseline.      Comments: Alert, oriented   Psychiatric:         Mood and Affect: Mood normal.         Behavior: Behavior normal.           ED Course, Procedures, & Data      Procedures                Results for orders placed or performed during the hospital encounter of 03/31/23   US Lower Extremity Venous Duplex Left     Status: None    Narrative    EXAM: US LOWER EXTREMITY VENOUS DUPLEX LEFT  LOCATION: [facility]  DATE/TIME: 3/31/2023 5:10 PM    INDICATION: Calf swelling  COMPARISON: None.  TECHNIQUE: Venous Duplex ultrasound of the left lower extremity with and without compression, augmentation and duplex. Color flow and spectral Doppler with waveform analysis performed.    FINDINGS: Exam includes the common femoral, femoral, popliteal, and contralateral common femoral " veins as well as segmentally visualized deep calf veins and greater saphenous vein.     LEFT: No deep vein thrombosis. No superficial thrombophlebitis. No popliteal cyst.      Impression    IMPRESSION:  1.  No deep venous thrombosis in the left lower extremity.     Medications - No data to display  Labs Ordered and Resulted from Time of ED Arrival to Time of ED Departure - No data to display  US Lower Extremity Venous Duplex Left   Final Result   IMPRESSION:   1.  No deep venous thrombosis in the left lower extremity.               Medical Decision Making  The patient's presentation is strongly suggestive of high complexity (an acute health issue posing potential threat to life or bodily function).    The patient's evaluation involved:  review of external note(s) from 3+ sources (Most recent H&P in addition to clinic and ED note)  review of 2 test result(s) ordered prior to this encounter (Most recent BMP and CBC)    The patient's management involved only low risk treatment.      Assessment & Plan    32-year-old female presents to us with a chief complaint of leg swelling.  She has low risk for blood clot.  We did do an ultrasound to confirm and there was no blood clot present.  There is no evidence of cellulitis or infection.  Patient is a regular runner so this could be related to overuse.  Recommend she follow-up with primary care and return as needed.    I have reviewed the nursing notes. I have reviewed the findings, diagnosis, plan and need for follow up with the patient.    New Prescriptions    No medications on file       Final diagnoses:   Calf swelling     Pati DE LA CRUZ, am serving as a trained medical scribe to document services personally performed by Moise Yan DO based on the provider's statements to me on March 31, 2023.  This document has been checked and approved by the attending provider.    IMoise DO, was physically present and have reviewed and verified the accuracy  of this note documented by Pati Aguilar, medical scribe.      Moise Yan DO   [facility] EMERGENCY DEPARTMENT  3/31/2023     Moise Yan DO  03/31/23 1736

## 2023-03-31 NOTE — TELEPHONE ENCOUNTER
"Patient calling clinic reporting swelling in left calf that started 3/30/23.   Left calf feels cooler than the right calf. Per patient, no discoloration seen at this time. Patient does feel a slight pinching/tingling in calf.   Left foot is not swollen, cool, or blue in appearance. Right LE is not affected.     Pt denies hx of blood clots, states she did have a cousin that has passed from blood clot with unknown etiology.     Disposition: Go to ED/UCC now or to office with PCP approval    Writer has dispositioned up: Go to ED Now, per nursing judgement, and advised patient to be seen in ED now due to possibility of DVT. Advised patient to be seen now and to remove any compression stockings or restrictive clothing from legs if wearing any (do not want potential clot to travel up leg). Patient agrees, states she is not wearing compression stockings but is wearing looser leggings.     Patient will go to ED now.         Reason for Disposition    Thigh, calf, or ankle swelling in only one leg    Additional Information    Negative: Sounds like a life-threatening emergency to the triager    Negative: Chest pain    Negative: Small area of swelling and followed an insect bite to the area    Negative: Followed a knee injury    Negative: Ankle or foot injury    Negative: Pregnant with leg swelling or edema    Negative: Difficulty breathing at rest    Negative: Entire foot is cool or blue in comparison to other side    Negative: SEVERE swelling (e.g., swelling extends above knee, entire leg is swollen, weeping fluid)    Negative: Thigh or calf pain and only 1 side and present > 1 hour    Answer Assessment - Initial Assessment Questions  1. ONSET: \"When did the swelling start?\" (e.g., minutes, hours, days)      3/30/23  2. LOCATION: \"What part of the leg is swollen?\"  \"Are both legs swollen or just one leg?\"      Left leg  3. SEVERITY: \"How bad is the swelling?\" (e.g., localized; mild, moderate, severe)   - Localized - small " "area of swelling localized to one leg   - MILD pedal edema - swelling limited to foot and ankle, pitting edema < 1/4 inch (6 mm) deep, rest and elevation eliminate most or all swelling   - MODERATE edema - swelling of lower leg to knee, pitting edema > 1/4 inch (6 mm) deep, rest and elevation only partially reduce swelling   - SEVERE edema - swelling extends above knee, facial or hand swelling present       Moderate- swelling goes up to just below the knee  4. REDNESS: \"Does the swelling look red or infected?\"      No  5. PAIN: \"Is the swelling painful to touch?\" If Yes, ask: \"How painful is it?\"   (Scale 1-10; mild, moderate or severe)      No pain. Pinching feeling from tightness feeling from swelling  6. FEVER: \"Do you have a fever?\" If Yes, ask: \"What is it, how was it measured, and when did it start?\"       No  7. CAUSE: \"What do you think is causing the leg swelling?\"      Unknown  8. MEDICAL HISTORY: \"Do you have a history of heart failure, kidney disease, liver failure, or cancer?\"      No  9. RECURRENT SYMPTOM: \"Have you had leg swelling before?\" If Yes, ask: \"When was the last time?\" \"What happened that time?\"      No  10. OTHER SYMPTOMS: \"Do you have any other symptoms?\" (e.g., chest pain, difficulty breathing)       No  11. PREGNANCY: \"Is there any chance you are pregnant?\" \"When was your last menstrual period?\"       3/25/23    Protocols used: LEG SWELLING AND EDEMA-A-OH      "

## 2024-01-09 DIAGNOSIS — Z30.41 ENCOUNTER FOR SURVEILLANCE OF CONTRACEPTIVE PILLS: ICD-10-CM

## 2024-01-09 RX ORDER — NORETHINDRONE ACETATE AND ETHINYL ESTRADIOL AND FERROUS FUMARATE 1.5-30(21)
KIT ORAL
Qty: 84 TABLET | Refills: 0 | Status: SHIPPED | OUTPATIENT
Start: 2024-01-09 | End: 2024-03-27

## 2024-01-19 NOTE — TELEPHONE ENCOUNTER
Pt calling to get an update on her refill,    Another provider willing to fill the medication today for the pt ?    Please call pt if this gets filled today       Alma Blackwell    [facility]

## 2024-03-23 SDOH — HEALTH STABILITY: PHYSICAL HEALTH: ON AVERAGE, HOW MANY DAYS PER WEEK DO YOU ENGAGE IN MODERATE TO STRENUOUS EXERCISE (LIKE A BRISK WALK)?: 6 DAYS

## 2024-03-23 SDOH — HEALTH STABILITY: PHYSICAL HEALTH: ON AVERAGE, HOW MANY MINUTES DO YOU ENGAGE IN EXERCISE AT THIS LEVEL?: 60 MIN

## 2024-03-23 ASSESSMENT — SOCIAL DETERMINANTS OF HEALTH (SDOH): HOW OFTEN DO YOU GET TOGETHER WITH FRIENDS OR RELATIVES?: ONCE A WEEK

## 2024-03-27 ENCOUNTER — OFFICE VISIT (OUTPATIENT)
Dept: FAMILY MEDICINE | Facility: CLINIC | Age: 34
End: 2024-03-27
Payer: COMMERCIAL

## 2024-03-27 ENCOUNTER — ANCILLARY PROCEDURE (OUTPATIENT)
Dept: GENERAL RADIOLOGY | Facility: CLINIC | Age: 34
End: 2024-03-27
Attending: STUDENT IN AN ORGANIZED HEALTH CARE EDUCATION/TRAINING PROGRAM
Payer: COMMERCIAL

## 2024-03-27 VITALS
HEIGHT: 63 IN | WEIGHT: 154 LBS | TEMPERATURE: 98.5 F | HEART RATE: 68 BPM | DIASTOLIC BLOOD PRESSURE: 76 MMHG | BODY MASS INDEX: 27.29 KG/M2 | SYSTOLIC BLOOD PRESSURE: 121 MMHG | RESPIRATION RATE: 18 BRPM | OXYGEN SATURATION: 100 %

## 2024-03-27 DIAGNOSIS — M53.3 SACRAL PAIN: ICD-10-CM

## 2024-03-27 DIAGNOSIS — Z00.00 ROUTINE GENERAL MEDICAL EXAMINATION AT A HEALTH CARE FACILITY: Primary | ICD-10-CM

## 2024-03-27 DIAGNOSIS — Z82.69 FAMILY HISTORY OF ANKYLOSING SPONDYLITIS: ICD-10-CM

## 2024-03-27 DIAGNOSIS — Z11.59 NEED FOR HEPATITIS C SCREENING TEST: ICD-10-CM

## 2024-03-27 DIAGNOSIS — R10.2 PELVIC PAIN IN FEMALE: ICD-10-CM

## 2024-03-27 DIAGNOSIS — Z13.0 SCREENING FOR DEFICIENCY ANEMIA: ICD-10-CM

## 2024-03-27 DIAGNOSIS — Z30.41 ENCOUNTER FOR SURVEILLANCE OF CONTRACEPTIVE PILLS: ICD-10-CM

## 2024-03-27 DIAGNOSIS — Z11.4 SCREENING FOR HIV (HUMAN IMMUNODEFICIENCY VIRUS): ICD-10-CM

## 2024-03-27 DIAGNOSIS — N94.10 DYSPAREUNIA IN FEMALE: ICD-10-CM

## 2024-03-27 LAB
BASOPHILS # BLD AUTO: 0 10E3/UL (ref 0–0.2)
BASOPHILS NFR BLD AUTO: 0 %
CRP SERPL-MCNC: 7.47 MG/L
EOSINOPHIL # BLD AUTO: 0 10E3/UL (ref 0–0.7)
EOSINOPHIL NFR BLD AUTO: 0 %
ERYTHROCYTE [DISTWIDTH] IN BLOOD BY AUTOMATED COUNT: 11.9 % (ref 10–15)
ERYTHROCYTE [SEDIMENTATION RATE] IN BLOOD BY WESTERGREN METHOD: 8 MM/HR (ref 0–20)
HCT VFR BLD AUTO: 43 % (ref 35–47)
HCV AB SERPL QL IA: NONREACTIVE
HGB BLD-MCNC: 13.8 G/DL (ref 11.7–15.7)
IMM GRANULOCYTES # BLD: 0 10E3/UL
IMM GRANULOCYTES NFR BLD: 0 %
LYMPHOCYTES # BLD AUTO: 3.1 10E3/UL (ref 0.8–5.3)
LYMPHOCYTES NFR BLD AUTO: 36 %
MCH RBC QN AUTO: 31.4 PG (ref 26.5–33)
MCHC RBC AUTO-ENTMCNC: 32.1 G/DL (ref 31.5–36.5)
MCV RBC AUTO: 98 FL (ref 78–100)
MONOCYTES # BLD AUTO: 0.4 10E3/UL (ref 0–1.3)
MONOCYTES NFR BLD AUTO: 5 %
NEUTROPHILS # BLD AUTO: 5.1 10E3/UL (ref 1.6–8.3)
NEUTROPHILS NFR BLD AUTO: 59 %
PLATELET # BLD AUTO: 302 10E3/UL (ref 150–450)
RBC # BLD AUTO: 4.39 10E6/UL (ref 3.8–5.2)
WBC # BLD AUTO: 8.7 10E3/UL (ref 4–11)

## 2024-03-27 PROCEDURE — 85652 RBC SED RATE AUTOMATED: CPT | Performed by: STUDENT IN AN ORGANIZED HEALTH CARE EDUCATION/TRAINING PROGRAM

## 2024-03-27 PROCEDURE — 85025 COMPLETE CBC W/AUTO DIFF WBC: CPT | Performed by: STUDENT IN AN ORGANIZED HEALTH CARE EDUCATION/TRAINING PROGRAM

## 2024-03-27 PROCEDURE — 86803 HEPATITIS C AB TEST: CPT | Performed by: STUDENT IN AN ORGANIZED HEALTH CARE EDUCATION/TRAINING PROGRAM

## 2024-03-27 PROCEDURE — 87389 HIV-1 AG W/HIV-1&-2 AB AG IA: CPT | Performed by: STUDENT IN AN ORGANIZED HEALTH CARE EDUCATION/TRAINING PROGRAM

## 2024-03-27 PROCEDURE — 99395 PREV VISIT EST AGE 18-39: CPT | Performed by: STUDENT IN AN ORGANIZED HEALTH CARE EDUCATION/TRAINING PROGRAM

## 2024-03-27 PROCEDURE — 99214 OFFICE O/P EST MOD 30 MIN: CPT | Mod: 25 | Performed by: STUDENT IN AN ORGANIZED HEALTH CARE EDUCATION/TRAINING PROGRAM

## 2024-03-27 PROCEDURE — 36415 COLL VENOUS BLD VENIPUNCTURE: CPT | Performed by: STUDENT IN AN ORGANIZED HEALTH CARE EDUCATION/TRAINING PROGRAM

## 2024-03-27 PROCEDURE — 72220 X-RAY EXAM SACRUM TAILBONE: CPT | Mod: TC | Performed by: RADIOLOGY

## 2024-03-27 PROCEDURE — 86140 C-REACTIVE PROTEIN: CPT | Performed by: STUDENT IN AN ORGANIZED HEALTH CARE EDUCATION/TRAINING PROGRAM

## 2024-03-27 PROCEDURE — 81374 HLA I TYPING 1 ANTIGEN LR: CPT | Performed by: STUDENT IN AN ORGANIZED HEALTH CARE EDUCATION/TRAINING PROGRAM

## 2024-03-27 RX ORDER — NORETHINDRONE ACETATE AND ETHINYL ESTRADIOL 1.5-30(21)
KIT ORAL
Qty: 84 TABLET | Refills: 3 | Status: SHIPPED | OUTPATIENT
Start: 2024-03-27

## 2024-03-27 NOTE — PATIENT INSTRUCTIONS
Preventive Care Advice   This is general advice given by our system to help you stay healthy. However, your care team may have specific advice just for you. Please talk to your care team about your preventive care needs.  Nutrition  Eat 5 or more servings of fruits and vegetables each day.  Try wheat bread, brown rice and whole grain pasta (instead of white bread, rice, and pasta).  Get enough calcium and vitamin D. Check the label on foods and aim for 100% of the RDA (recommended daily allowance).  Lifestyle  Exercise at least 150 minutes each week   (30 minutes a day, 5 days a week).  Do muscle strengthening activities 2 days a week. These help control your weight and prevent disease.  No smoking.  Wear sunscreen to prevent skin cancer.  Have a dental exam and cleaning every 6 months.  Yearly exams  See your health care team every year to talk about:  Any changes in your health.  Any medicines your care team has prescribed.  Preventive care, family planning, and ways to prevent chronic diseases.  Shots (vaccines)   HPV shots (up to age 26), if you've never had them before.  Hepatitis B shots (up to age 59), if you've never had them before.  COVID-19 shot: Get this shot when it's due.  Flu shot: Get a flu shot every year.  Tetanus shot: Get a tetanus shot every 10 years.  Pneumococcal, hepatitis A, and RSV shots: Ask your care team if you need these based on your risk.  Shingles shot (for age 50 and up).  General health tests  Diabetes screening:  Starting at age 35, Get screened for diabetes at least every 3 years.  If you are younger than age 35, ask your care team if you should be screened for diabetes.  Cholesterol test: At age 39, start having a cholesterol test every 5 years, or more often if advised.  Bone density scan (DEXA): At age 50, ask your care team if you should have this scan for osteoporosis (brittle bones).  Hepatitis C: Get tested at least once in your life.  STIs (sexually transmitted  infections)  Before age 24: Ask your care team if you should be screened for STIs.  After age 24: Get screened for STIs if you're at risk. You are at risk for STIs (including HIV) if:  You are sexually active with more than one person.  You don't use condoms every time.  You or a partner was diagnosed with a sexually transmitted infection.  If you are at risk for HIV, ask about PrEP medicine to prevent HIV.  Get tested for HIV at least once in your life, whether you are at risk for HIV or not.  Cancer screening tests  Cervical cancer screening: If you have a cervix, begin getting regular cervical cancer screening tests at age 21. Most people who have regular screenings with normal results can stop after age 65. Talk about this with your provider.  Breast cancer scan (mammogram): If you've ever had breasts, begin having regular mammograms starting at age 40. This is a scan to check for breast cancer.  Colon cancer screening: It is important to start screening for colon cancer at age 45.  Have a colonoscopy test every 10 years (or more often if you're at risk) Or, ask your provider about stool tests like a FIT test every year or Cologuard test every 3 years.  To learn more about your testing options, visit: https://www.Conductiv/835156.pdf.  For help making a decision, visit: https://bit.ly/tt01242.  Prostate cancer screening test: If you have a prostate and are age 55 to 69, ask your provider if you would benefit from a yearly prostate cancer screening test.  Lung cancer screening: If you are a current or former smoker age 50 to 80, ask your care team if ongoing lung cancer screenings are right for you.  For informational purposes only. Not to replace the advice of your health care provider. Copyright   2023 [employer]. All rights reserved. Clinically reviewed by the [facility] Transitions Program. [employer] 834465 - REV 01/24.    Learning About Stress  What is stress?     Stress is your  body's response to a hard situation. Your body can have a physical, emotional, or mental response. Stress is a fact of life for most people, and it affects everyone differently. What causes stress for you may not be stressful for someone else.  A lot of things can cause stress. You may feel stress when you go on a job interview, take a test, or run a race. This kind of short-term stress is normal and even useful. It can help you if you need to work hard or react quickly. For example, stress can help you finish an important job on time.  Long-term stress is caused by ongoing stressful situations or events. Examples of long-term stress include long-term health problems, ongoing problems at work, or conflicts in your family. Long-term stress can harm your health.  How does stress affect your health?  When you are stressed, your body responds as though you are in danger. It makes hormones that speed up your heart, make you breathe faster, and give you a burst of energy. This is called the fight-or-flight stress response. If the stress is over quickly, your body goes back to normal and no harm is done.  But if stress happens too often or lasts too long, it can have bad effects. Long-term stress can make you more likely to get sick, and it can make symptoms of some diseases worse. If you tense up when you are stressed, you may develop neck, shoulder, or low back pain. Stress is linked to high blood pressure and heart disease.  Stress also harms your emotional health. It can make you hill, tense, or depressed. Your relationships may suffer, and you may not do well at work or school.  What can you do to manage stress?  You can try these things to help manage stress:   Do something active. Exercise or activity can help reduce stress. Walking is a great way to get started. Even everyday activities such as housecleaning or yard work can help.  Try yoga or marco chi. These techniques combine exercise and meditation. You may need  some training at first to learn them.  Do something you enjoy. For example, listen to music or go to a movie. Practice your hobby or do volunteer work.  Meditate. This can help you relax, because you are not worrying about what happened before or what may happen in the future.  Do guided imagery. Imagine yourself in any setting that helps you feel calm. You can use online videos, books, or a teacher to guide you.  Do breathing exercises. For example:  From a standing position, bend forward from the waist with your knees slightly bent. Let your arms dangle close to the floor.  Breathe in slowly and deeply as you return to a standing position. Roll up slowly and lift your head last.  Hold your breath for just a few seconds in the standing position.  Breathe out slowly and bend forward from the waist.  Let your feelings out. Talk, laugh, cry, and express anger when you need to. Talking with supportive friends or family, a counselor, or a monika leader about your feelings is a healthy way to relieve stress. Avoid discussing your feelings with people who make you feel worse.  Write. It may help to write about things that are bothering you. This helps you find out how much stress you feel and what is causing it. When you know this, you can find better ways to cope.  What can you do to prevent stress?  You might try some of these things to help prevent stress:  Manage your time. This helps you find time to do the things you want and need to do.  Get enough sleep. Your body recovers from the stresses of the day while you are sleeping.  Get support. Your family, friends, and community can make a difference in how you experience stress.  Limit your news feed. Avoid or limit time on social media or news that may make you feel stressed.  Do something active. Exercise or activity can help reduce stress. Walking is a great way to get started.  Where can you learn more?  Go to https://www.healthwise.net/patiented  Enter N032 in the  "search box to learn more about \"Learning About Stress.\"  Current as of: October 24, 2023               Content Version: 14.0    8263-9802 [employer].   Care instructions adapted under license by your healthcare professional. If you have questions about a medical condition or this instruction, always ask your healthcare professional. [employer] disclaims any warranty or liability for your use of this information.      "

## 2024-03-27 NOTE — PROGRESS NOTES
Preventive Care Visit  [facility]  Dianne Ingram DO, Family Medicine  Mar 27, 2024      Assessment & Plan     Routine general medical examination at a health care facility  Vitals and BMI stable     Encounter for surveillance of contraceptive pills  No new contraindications to use. Tolerates well. Skips period every other month.   - norethindrone-ethinyl estradiol-iron (JUNEL FE 1.5/30) 1.5-30 MG-MCG tablet; TAKE 1 TABLET BY MOUTH DAILY. SKIP PLACEBO EVERY OTHER MONTH    Screening for HIV (human immunodeficiency virus)  - HIV Antigen Antibody Combo; Future  - HIV Antigen Antibody Combo    Need for hepatitis C screening test  - Hepatitis C Screen Reflex to HCV RNA Quant and Genotype; Future  - Hepatitis C Screen Reflex to HCV RNA Quant and Genotype    Family history of ankylosing spondylitis  Sacral pain  Brother HLA B27 positive. She is having  left sacral pain that is chronic and recurring, she would like to be evaluated for this as well. Genetic testing form completed and will obtain HLA B27, CRP and ESR. We will also Xray the area of her recurring pain. Consider sacroiliitis on differential as well. We will refer to PT for sacral pain. May have piriformis component involved and could benefit by some stretching/strengthening exercises.   - XR Sacrum and Coccyx 2 Views; Future  - HLA-B27 Typing; Future  - ESR: Erythrocyte sedimentation rate; Future  - CRP, inflammation; Future  - HLA-B27 Typing  - ESR: Erythrocyte sedimentation rate  - CRP, inflammation    Screening for deficiency anemia  - CBC with platelets and differential; Future  - CBC with platelets and differential    Pelvic pain in female  Dyspareunia in female  Endorses intermittent cramping when not on menses in suprapubic region and also having dyspareunia. Denies dyspareunia in the past but has recently become sexually active again. Recommend TVUS. Declines pelvic exam today. No vaginal discharge and no STI concern so no  "swabs obtained.   - US Pelvic Complete with Transvaginal; Future    Patient has been advised of split billing requirements and indicates understanding: Yes          BMI  Estimated body mass index is 27.28 kg/m  as calculated from the following:    Height as of this encounter: 1.6 m (5' 3\").    Weight as of this encounter: 69.9 kg (154 lb).   Weight management plan: Discussed healthy diet and exercise guidelines    Counseling  Appropriate preventive services were discussed with this patient, including applicable screening as appropriate for fall prevention, nutrition, physical activity, Tobacco-use cessation, weight loss and cognition.  Checklist reviewing preventive services available has been given to the patient.  Reviewed patient's diet, addressing concerns and/or questions.           Quita Ibarra is a 33 year old, presenting for the following:  Physical        3/27/2024     1:55 PM   Additional Questions   Accompanied by na         3/27/2024     1:55 PM   Patient Reported Additional Medications   Patient reports taking the following new medications none        Health Care Directive  Patient does not have a Health Care Directive or Living Will: Discussed advance care planning with patient; however, patient declined at this time.    HPI      Back pain-  Left side of tailbone. Localized.  Issues for years never got this figure out.  No numbness/tingling  Pain/discomfort: fluctuates when it bad sharp. In the background it aches.  Due to pain makes hard to walk. Pain with standing and walking, not as much when sitting on it.     On and off tailbone pain since 2016. Sometimes won't have it for months at a time. Then when it happens itll last for a couple months. Now having pain on tailbone since early February. Typically resolves on it's own within a couple months. Starting to interfere with exercise. Thinks exercise might exacerbate this as well. Occasional popping sounds in lower back.   Hx of whiplash injury " on trampoline when 9yo. No other injuries/falls.       Heavy menses - skips placebo week every other month. Sometimes gets sharp bad cramps sometimes when on period but most often when not on period. Typically in suprapubic area. Not associated with vaginal discharge or hematuria or dysuria. Last occurred 2 weeks ago. Typically lasts from 5-30 seconds, feels like a sharp contraction. When on period tends to get dull achy cramps and migraines on periods. Has happened more in the past 1-2 years. Has septate period.     Migraines - happens around periods or stress/travel. No aura.     Non-smoker   No fam hx of clotting disorder     Diet - vegan. Eats a lot of vegetables, beans, whole grains. Started tracking protein recently.     Exercise: 30 minute strength routine few days per week, walks daily 30 minutes, and runs 5k three times per week.     Mental health has been good.               3/23/2024   General Health   How would you rate your overall physical health? Good   Feel stress (tense, anxious, or unable to sleep) To some extent   (!) STRESS CONCERN      3/23/2024   Nutrition   Three or more servings of calcium each day? Yes   Diet: Vegetarian/vegan   How many servings of fruit and vegetables per day? 4 or more   How many sweetened beverages each day? (!) 2         3/23/2024   Exercise   Days per week of moderate/strenous exercise 6 days   Average minutes spent exercising at this level 60 min         3/23/2024   Social Factors   Frequency of gathering with friends or relatives Once a week   Worry food won't last until get money to buy more No   Food not last or not have enough money for food? No   Do you have housing?  Yes   Are you worried about losing your housing? No   Lack of transportation? No   Unable to get utilities (heat,electricity)? No         3/23/2024   Dental   Dentist two times every year? Yes         3/23/2024   TB Screening   Were you born outside of the [place]? No         Today's PHQ-2 Score:        3/27/2024    12:46 PM   PHQ-2 ( 1999 [employer])   Q1: Little interest or pleasure in doing things 0   Q2: Feeling down, depressed or hopeless 0   PHQ-2 Score 0   Q1: Little interest or pleasure in doing things Not at all   Q2: Feeling down, depressed or hopeless Not at all   PHQ-2 Score 0           3/23/2024   Substance Use   Alcohol more than 3/day or more than 7/wk No   Do you use any other substances recreationally? No     Social History     Tobacco Use    Smoking status: Never    Smokeless tobacco: Never   Vaping Use    Vaping Use: Never used   Substance Use Topics    Alcohol use: Not Currently     Comment: Minimal    Drug use: Never             3/23/2024   Breast Cancer Screening   Family history of breast, colon, or ovarian cancer? Yes         3/23/2024   LAST FHS-7 RESULTS   1st degree relative breast or ovarian cancer No   Any relative bilateral breast cancer Unknown   Any male have breast cancer No   Any ONE woman have BOTH breast AND ovarian cancer No   Any woman with breast cancer before 50yrs Yes   2 or more relatives with breast AND/OR ovarian cancer No   2 or more relatives with breast AND/OR bowel cancer Yes        Mammogram Screening - Patient under 40 years of age: Routine Mammogram Screening not recommended.           3/23/2024   One time HIV Screening   Previous HIV test? I don't know         3/23/2024   STI Screening   New sexual partner(s) since last STI/HIV test? No     History of abnormal Pap smear: NO - age 30-65 PAP every 5 years with negative HPV co-testing recommended  Last 3 Pap and HPV Results:       Latest Ref Rng & Units 3/8/2023    10:12 AM 6/26/2019     3:38 PM 4/14/2016     5:19 PM   PAP / HPV   PAP  Negative for Intraepithelial Lesion or Malignancy (NILM)      PAP (Historical)   NIL  NIL    HPV 16 DNA Negative Negative      HPV 18 DNA Negative Negative      Other HR HPV Negative Negative              Latest Ref Rng & Units 3/8/2023    10:12 AM 6/26/2019     3:38 PM 4/14/2016     5:19  PM   PAP / HPV   PAP  Negative for Intraepithelial Lesion or Malignancy (NILM)      PAP (Historical)   NIL  NIL    HPV 16 DNA Negative Negative      HPV 18 DNA Negative Negative      Other HR HPV Negative Negative              3/23/2024   Contraception/Family Planning   Questions about contraception or family planning No        Reviewed and updated as needed this visit by Provider                    Past Medical History:   Diagnosis Date    Papanicolaou smear of cervix with low grade squamous intraepithelial lesion (LGSIL) 2015    colp - EUGENE I     Past Surgical History:   Procedure Laterality Date    BIOPSY  2015    Cervix    EYE SURGERY  2013    Minor removal of corneal deposit, twice, in right eye     OB History    Para Term  AB Living   0 0 0 0 0 0   SAB IAB Ectopic Multiple Live Births   0 0 0 0 0     Lab work is in process  Labs reviewed in EPIC  BP Readings from Last 3 Encounters:   24 121/76   23 106/74   23 118/80    Wt Readings from Last 3 Encounters:   24 69.9 kg (154 lb)   23 72.6 kg (160 lb)   23 71.2 kg (157 lb)                  Patient Active Problem List   Diagnosis    Papanicolaou smear of cervix with low grade squamous intraepithelial lesion (LGSIL)    Septate uterus    Family history of ankylosing spondylitis    Migraine without aura and without status migrainosus, not intractable     Past Surgical History:   Procedure Laterality Date    BIOPSY  2015    Cervix    EYE SURGERY  2013    Minor removal of corneal deposit, twice, in right eye       Social History     Tobacco Use    Smoking status: Never    Smokeless tobacco: Never   Substance Use Topics    Alcohol use: Not Currently     Comment: Minimal     Family History   Problem Relation Age of Onset    Diabetes Father     Hyperlipidemia Father     GERD Father         Heartburn    Obesity Father     Depression Maternal Grandmother     Colon Cancer Paternal Grandfather     Skin Cancer  "Paternal Aunt     Depression Brother     Anxiety Disorder Brother     Genetic Disorder Brother         Tested positive for HLA-B27. Doctors suspect  Ankylosing Spondylitis, or something similar         Current Outpatient Medications   Medication Sig Dispense Refill    norethindrone-ethinyl estradiol-iron (JUNEL FE 1.5/30) 1.5-30 MG-MCG tablet TAKE 1 TABLET BY MOUTH DAILY. SKIP PLACEBO EVERY OTHER MONTH 84 tablet 3    Cyanocobalamin (B-12) 1000 MCG CAPS Take 1,000 mcg by mouth daily       Allergies   Allergen Reactions    Lactose          Review of Systems  Constitutional, HEENT, cardiovascular, pulmonary, gi and gu systems are negative, except as otherwise noted.     Objective    Exam  /76 (Cuff Size: Adult Regular)   Pulse 68   Temp 98.5  F (36.9  C) (Oral)   Resp 18   Ht 1.6 m (5' 3\")   Wt 69.9 kg (154 lb)   LMP 02/11/2024   SpO2 100%   BMI 27.28 kg/m     Estimated body mass index is 27.28 kg/m  as calculated from the following:    Height as of this encounter: 1.6 m (5' 3\").    Weight as of this encounter: 69.9 kg (154 lb).    Physical Exam  GENERAL: alert and no distress  EYES: Eyes grossly normal to inspection, PERRL and conjunctivae and sclerae normal  HENT: ear canals and TM's normal, nose and mouth without ulcers or lesions  NECK: no adenopathy, no asymmetry, masses, or scars  RESP: lungs clear to auscultation - no rales, rhonchi or wheezes  CV: regular rate and rhythm, normal S1 S2, no S3 or S4, no murmur, click or rub, no peripheral edema  ABDOMEN: soft, nontender, no hepatosplenomegaly, no masses and bowel sounds normal  MS: no gross musculoskeletal defects noted, no edema. Pain overal lower lateral left sacrum.   SKIN: no suspicious lesions or rashes  NEURO: Normal strength and tone, mentation intact and speech normal  PSYCH: mentation appears normal, affect normal/bright        Signed Electronically by: Dianne Ingram, DO    "

## 2024-03-28 LAB — HIV 1+2 AB+HIV1 P24 AG SERPL QL IA: NONREACTIVE

## 2024-04-04 ENCOUNTER — ANCILLARY PROCEDURE (OUTPATIENT)
Dept: ULTRASOUND IMAGING | Facility: CLINIC | Age: 34
End: 2024-04-04
Attending: STUDENT IN AN ORGANIZED HEALTH CARE EDUCATION/TRAINING PROGRAM
Payer: COMMERCIAL

## 2024-04-04 DIAGNOSIS — R10.2 PELVIC PAIN IN FEMALE: ICD-10-CM

## 2024-04-04 DIAGNOSIS — N94.10 DYSPAREUNIA IN FEMALE: ICD-10-CM

## 2024-04-04 PROCEDURE — 76830 TRANSVAGINAL US NON-OB: CPT

## 2024-04-04 PROCEDURE — 76856 US EXAM PELVIC COMPLETE: CPT

## 2024-04-05 DIAGNOSIS — Z15.89 HLA B27 (HLA B27 POSITIVE): Primary | ICD-10-CM

## 2024-04-05 DIAGNOSIS — M53.3 SACRAL PAIN: ICD-10-CM

## 2024-04-05 LAB
B LOCUS: NORMAL
B27TEST METHOD: NORMAL

## 2024-05-03 ENCOUNTER — THERAPY VISIT (OUTPATIENT)
Dept: PHYSICAL THERAPY | Facility: CLINIC | Age: 34
End: 2024-05-03
Attending: STUDENT IN AN ORGANIZED HEALTH CARE EDUCATION/TRAINING PROGRAM
Payer: COMMERCIAL

## 2024-05-03 DIAGNOSIS — M53.3 SACRAL PAIN: ICD-10-CM

## 2024-05-03 PROCEDURE — 97110 THERAPEUTIC EXERCISES: CPT | Mod: GP

## 2024-05-03 PROCEDURE — 97161 PT EVAL LOW COMPLEX 20 MIN: CPT | Mod: GP | Performed by: PHYSICAL THERAPIST

## 2024-05-03 PROCEDURE — 97110 THERAPEUTIC EXERCISES: CPT | Mod: GP | Performed by: PHYSICAL THERAPIST

## 2024-05-03 PROCEDURE — 97161 PT EVAL LOW COMPLEX 20 MIN: CPT | Mod: GP

## 2024-05-03 NOTE — PROGRESS NOTES
PHYSICAL THERAPY EVALUATION  Type of Visit: Evaluation    See electronic medical record for Abuse and Falls Screening details.    Subjective     Pt states that she has on and off pain in her tailbone for around 8 years. Fluctuates from very severe and interferes with walking several times per year. Last flare up was in March which she attributes to a long walk and now is only having very mild pain. Standing up from a chair and marching legs can be a trigger when she is having more pain. Flares usually calm down after a few weeks, pt finds heat helpful.          Presenting condition or subjective complaint: I've had reoccurring pain in my lower back for over 5 years. It's sometimes so bad that i can't walk. It was severe early March but has been very mild for the past month.  Date of onset: 03/27/24    Relevant medical history: Bladder or bowel problems; Dizziness; Migraines or headaches; Severe headaches   Dates & types of surgery: None    Prior diagnostic imaging/testing results: X-ray     Prior therapy history for the same diagnosis, illness or injury: No        Living Environment  Social support: With a significant other or spouse   Type of home: House; 1 level; Basement   Stairs to enter the home: Yes 3 Is there a railing: No   Ramp: No   Stairs inside the home: Yes 10 Is there a railing: Yes   Help at home: None  Equipment owned:       Employment: Yes   Hobbies/Interests: Gardening, bicycling, walking, hiking, backpacking, dumbells, reading, writing, running    Patient goals for therapy: I'd like to walk, run, and hike for as many mile as i'd like. I'd also to know im not doing anything that is causing lasting damage.    Pain assessment: Pain present     Objective   LUMBAR SPINE EVALUATION  PAIN: Pain Level at Rest: 0/10  Pain Level with Use: 9/10  Pain Location: sacrum  Pain Quality: Sharp  Pain Frequency: intermittent  Pain is Worst: nighttime  Pain is Exacerbated By: walking, running,  standing after sitting, marching   Pain is Relieved By: heat and rest  Pain Progression: Improved  POSTURE: Standing Posture: Rounded shoulders, Forward head, Lordosis increased  GAIT:   Weightbearing Status: WBAT  Assistive Device(s): None  Gait Deviations: WFL  ROM:  Lumbar AROM painfree  and WNL   MYOTOMES:    Left Right   T12-L3 (Hip Flexion) 4+ 4+   L2-4 (Quads)  5 5   L4 (Ankle DF) 5 5   L5 (Great Toe Ext) 5 5   S1 (Toe Raise) 5 5   Glute Med: R 5-/5, L 5-/5, glute max: L 4+/5, R 5-/5   FLEXIBILITY:  (+) elys bilaterally, tight hamstrings bilaterally L>R   FUNCTIONAL TESTS: SLS: mildly (+) Trendelenburg L  PALPATION:  L piriformis, glute med, along L side of sacrum  SPINAL SEGMENTAL CONCLUSIONS: WNL    Assessment & Plan   CLINICAL IMPRESSIONS  Medical Diagnosis: Sacral pain    Treatment Diagnosis: Sacral pain   Impression/Assessment: Patient is a 33 year old female with sacrum pain complaints.  The following significant findings have been identified: Pain, Decreased strength, Impaired muscle performance, and Decreased activity tolerance. These impairments interfere with their ability to perform self care tasks, work tasks, recreational activities, and community mobility as compared to previous level of function.     Clinical Decision Making (Complexity):  Clinical Presentation: Stable/Uncomplicated  Clinical Presentation Rationale: based on medical and personal factors listed in PT evaluation  Clinical Decision Making (Complexity): Low complexity    PLAN OF CARE  Treatment Interventions:  Interventions: Gait Training, Manual Therapy, Neuromuscular Re-education, Therapeutic Activity, Therapeutic Exercise    Long Term Goals     PT Goal 1  Goal Description: Pt will be able to complete deep squat exercises without sacral pain for functional lifting at home.  Rationale: to maximize safety and independence with performance of ADLs and functional tasks  Target Date: 08/01/24      Frequency of Treatment: 1x per week,  tapering to 2x per month  Duration of Treatment: 3 months    Education Assessment:   Learner/Method: Patient;No Barriers to Learning    Risks and benefits of evaluation/treatment have been explained.   Patient/Family/caregiver agrees with Plan of Care.     Evaluation Time:     PT Eval, Low Complexity Minutes (81665): 16     Signing Clinician: Melani Puga PT

## 2024-05-10 ENCOUNTER — THERAPY VISIT (OUTPATIENT)
Dept: PHYSICAL THERAPY | Facility: CLINIC | Age: 34
End: 2024-05-10
Attending: STUDENT IN AN ORGANIZED HEALTH CARE EDUCATION/TRAINING PROGRAM
Payer: COMMERCIAL

## 2024-05-10 DIAGNOSIS — M53.3 SACRAL PAIN: Primary | ICD-10-CM

## 2024-05-10 PROCEDURE — 97110 THERAPEUTIC EXERCISES: CPT | Mod: GP | Performed by: PHYSICAL THERAPIST

## 2024-05-10 PROCEDURE — 97140 MANUAL THERAPY 1/> REGIONS: CPT | Mod: GP | Performed by: PHYSICAL THERAPIST

## 2024-05-17 ENCOUNTER — THERAPY VISIT (OUTPATIENT)
Dept: PHYSICAL THERAPY | Facility: CLINIC | Age: 34
End: 2024-05-17
Attending: STUDENT IN AN ORGANIZED HEALTH CARE EDUCATION/TRAINING PROGRAM
Payer: COMMERCIAL

## 2024-05-17 DIAGNOSIS — M53.3 SACRAL PAIN: Primary | ICD-10-CM

## 2024-05-17 PROCEDURE — 97110 THERAPEUTIC EXERCISES: CPT | Mod: GP | Performed by: PHYSICAL THERAPIST

## 2024-05-17 PROCEDURE — 97530 THERAPEUTIC ACTIVITIES: CPT | Mod: GP | Performed by: PHYSICAL THERAPIST

## 2024-05-31 ENCOUNTER — THERAPY VISIT (OUTPATIENT)
Dept: PHYSICAL THERAPY | Facility: CLINIC | Age: 34
End: 2024-05-31
Payer: COMMERCIAL

## 2024-05-31 DIAGNOSIS — M53.3 SACRAL PAIN: Primary | ICD-10-CM

## 2024-05-31 PROCEDURE — 97110 THERAPEUTIC EXERCISES: CPT | Mod: GP | Performed by: PHYSICAL THERAPIST

## 2024-05-31 PROCEDURE — 97530 THERAPEUTIC ACTIVITIES: CPT | Mod: GP | Performed by: PHYSICAL THERAPIST

## 2024-07-09 ENCOUNTER — OFFICE VISIT (OUTPATIENT)
Dept: RHEUMATOLOGY | Facility: CLINIC | Age: 34
End: 2024-07-09
Attending: STUDENT IN AN ORGANIZED HEALTH CARE EDUCATION/TRAINING PROGRAM
Payer: COMMERCIAL

## 2024-07-09 VITALS
WEIGHT: 154 LBS | DIASTOLIC BLOOD PRESSURE: 76 MMHG | HEART RATE: 94 BPM | SYSTOLIC BLOOD PRESSURE: 119 MMHG | OXYGEN SATURATION: 98 % | BODY MASS INDEX: 27.28 KG/M2

## 2024-07-09 DIAGNOSIS — M53.3 SACRAL PAIN: ICD-10-CM

## 2024-07-09 DIAGNOSIS — R10.30 LOWER ABDOMINAL PAIN: Primary | ICD-10-CM

## 2024-07-09 DIAGNOSIS — Z15.89 HLA B27 (HLA B27 POSITIVE): ICD-10-CM

## 2024-07-09 PROCEDURE — 99204 OFFICE O/P NEW MOD 45 MIN: CPT | Performed by: STUDENT IN AN ORGANIZED HEALTH CARE EDUCATION/TRAINING PROGRAM

## 2024-07-09 PROCEDURE — 99213 OFFICE O/P EST LOW 20 MIN: CPT | Performed by: STUDENT IN AN ORGANIZED HEALTH CARE EDUCATION/TRAINING PROGRAM

## 2024-07-09 RX ORDER — OMEGA-3 FATTY ACIDS/FISH OIL 300-1000MG
CAPSULE ORAL
COMMUNITY

## 2024-07-09 RX ORDER — MULTIPLE VITAMINS W/ MINERALS TAB 9MG-400MCG
1 TAB ORAL DAILY
COMMUNITY

## 2024-07-09 ASSESSMENT — PAIN SCALES - GENERAL: PAINLEVEL: NO PAIN (0)

## 2024-07-09 NOTE — PATIENT INSTRUCTIONS
Our plan for today is:    - Use carpal tunnel wrist splint at night to help with symptoms.   - Get MRI scan as discussed

## 2024-07-09 NOTE — NURSING NOTE
Chief Complaint   Patient presents with    Consult     /76 (BP Location: Left arm, Patient Position: Sitting, Cuff Size: Adult Regular)   Pulse 94   Wt 69.9 kg (154 lb)   LMP 06/28/2024 (Approximate)   SpO2 98%   BMI 27.28 kg/m

## 2024-07-09 NOTE — PROGRESS NOTES
RHEUMATOLOGY OUTPATIENT CLINIC NOTE     Referring Provider: Dianne Ingram, DO  [address] [place] [address]    Lab review    HLA-B27: Positive    Imaging review  X-ray sacrum, 3/2024:No displaced fracture identified on radiographs. Alignment of the visualized bony pelvis appears normal. The sacroiliac and pubic symphysis joint spaces appear to be maintained.    Subjective     Visit date July 9, 2024    Stacey is a 34 year old female who presents today for consult.     She complained of sacral pain to her primary care provider Dr. Ingram, who workup at this time showed: ESR: 8, CRP 7.4, HLA-B27 positive and given family history for HLA-B27 disease therefore rheumatology evaluation was recommended.    Stacey today mentioned that she has been having cocygeal pain, mainly in the tail bone area, for 8-9 year, denies buttock pain, no history of trauma,  pain comes and goes, sometimes improves with activity but not consistently, worse in the night, no consistent morning stiffness, She tried ibuprofen in the past and did not help. She has migraine and sometimes feels her migraine episodes trigger the back pain as well as stomach pain.     She denies arthritis in the hands, elbows, shoulders, knees, feet.     She sometimes has tingling in her hands at the end of the day or early in the morning and feels better with shaking her hands. She works on a desk job on a computer.     She has recurrent abdominal pain, umbilical and lower abdominal, no specific trigger, associated sometimes with alternation between constipation and diarrhea, she denies blood in stool, she was felt to have IBS by her primary care provider, She had colonoscopy in 2016, biopsy showed colon polyp with lymphoid aggregates. She then modified her diet and this helped with bowel symptoms somewhat but symptoms are still present.     She denies fevers, weight loss, night sweats.    She denies history of inflammatory eye disease    She  denies cough or shortness of breath, pleuritic chest pain    She has never smoked.     Family history is negative for psoriasis  but brother with HLA B27 positive and inflammatory eye disease and probably ankylosing spondylitis.     Past Medical history   Past Medical History:   Diagnosis Date    Papanicolaou smear of cervix with low grade squamous intraepithelial lesion (LGSIL) 04/2015    colp - EUGENE I       Objective   /76 (BP Location: Left arm, Patient Position: Sitting, Cuff Size: Adult Regular)   Pulse 94   Wt 69.9 kg (154 lb)   LMP 06/28/2024 (Approximate)   SpO2 98%   BMI 27.28 kg/m        PHYSICAL EXAMINATION  Physical Exam  HENT:      Head: Normocephalic.      Mouth/Throat:      Mouth: Mucous membranes are moist.   Eyes:      Conjunctiva/sclera: Conjunctivae normal.   Pulmonary:      Effort: Pulmonary effort is normal.      Breath sounds: Normal breath sounds.   Musculoskeletal:         General: No swelling or tenderness.      Comments: No noticeable swelling or significant tenderness in the hands (MCP, PIP, DIP), wrists, elbows, knees, ankles, feet. Range of motion in the cervical spine, lumbar spine, shoulders and hips are within accepted range for age.     RUBIO test negative bilaterally    Tinel test positive for median nerve compression   Skin:     Findings: No rash.   Neurological:      Mental Status: She is alert.       Assessment & Plan      # Low back pain  # Coccygeal pain   # Abdominal pain, alteration between diarrhea and constipation  # Colon biopsy showing lymphocyte aggregates  # Carpal tunnel syndrome  # Family history of HLA-B27 positive, inflammatory eye disease, probable ankylosing spondylitis  # HLA-B27 positive  # Elevated CRP    Stacey is a very pleasant 34-year-old female referred to rheumatology for evaluation of ankylosing spondylitis.  She has chronic low back pain and coccygeal pain, likely mechanical but she has strong family history for HLA-B27 disease [inflammatory  eye disease, ankylosing spondylitis in her brother] and she is HLA-B27 positive, CRP is elevated as well.      There is no evidence of active arthritis today on exam, RUBIO test negative bilaterally.  Tinel's test is positive for carpal tunnel syndrome.  X-rays were negative for erosions in the SI joint.    She has abdominal pain with intermittent diarrhea/constipation, however given between 7 positivity and elevated CRP then reasonable to evaluate for inflammatory bowel disease.    Her joint pain seems to be mechanical however she has risk factors as above that warrant further evaluation for HLA-B27 mediated disease.    We discussed getting MR enterography, this will help to evaluate for inflammatory bowel disease as well as have images of the SI joint to evaluate for inflammatory sacroiliitis.    For carpal tunnel syndrome, we discussed using wrist splints in the night to help with her symptoms.    We also discussed warning signs suggestive of spondylarthritis for which follow-up would be indicated and this include inflammatory joint pain that include joint swelling in the peripheral joints [hands, wrists, elbows, knees, feet] that is worse in the morning and improves with activity.    We also discussed symptoms indicative of inflammatory sacroiliitis with chronic low back pain, morning stiffness that improves with activity and worsens with rest.    I will reach out to her after MRI results to discuss the next steps.    She is in agreement with this plan and seems comfortable with this approach.      57 minutes spent by me on the date of the encounter doing chart review, history and exam, documentation and further activities per the note        Cherelle Henry MD  [facility] RHEUMATOLOGY

## 2024-07-09 NOTE — LETTER
7/9/2024       RE: Stacey Zavala  [address] [facility] [address]     Dear Colleague,    Thank you for referring your patient, Stacey Zavala, to the [facility] RHEUMATOLOGY at [facility]. Please see a copy of my visit note below.    RHEUMATOLOGY OUTPATIENT CLINIC NOTE     Referring Provider: Dianne Ingram, DO  [address] [place] [address]    Lab review    HLA-B27: Positive    Imaging review  X-ray sacrum, 3/2024:No displaced fracture identified on radiographs. Alignment of the visualized bony pelvis appears normal. The sacroiliac and pubic symphysis joint spaces appear to be maintained.    Subjective     Visit date July 9, 2024    Stacey is a 34 year old female who presents today for consult.     She complained of sacral pain to her primary care provider Dr. Ingram, who workup at this time showed: ESR: 8, CRP 7.4, HLA-B27 positive and given family history for HLA-B27 disease therefore rheumatology evaluation was recommended.    Stacey today mentioned that she has been having cocygeal pain, mainly in the tail bone area, for 8-9 year, denies buttock pain, no history of trauma,  pain comes and goes, sometimes improves with activity but not consistently, worse in the night, no consistent morning stiffness, She tried ibuprofen in the past and did not help. She has migraine and sometimes feels her migraine episodes trigger the back pain as well as stomach pain.     She denies arthritis in the hands, elbows, shoulders, knees, feet.     She sometimes has tingling in her hands at the end of the day or early in the morning and feels better with shaking her hands. She works on a desk job on a computer.     She has recurrent abdominal pain, umbilical and lower abdominal, no specific trigger, associated sometimes with alternation between constipation and diarrhea, she denies blood in stool, she was felt to have IBS by her primary care  provider, She had colonoscopy in 2016, biopsy showed colon polyp with lymphoid aggregates. She then modified her diet and this helped with bowel symptoms somewhat but symptoms are still present.     She denies fevers, weight loss, night sweats.    She denies history of inflammatory eye disease    She denies cough or shortness of breath, pleuritic chest pain    She has never smoked.     Family history is negative for psoriasis  but brother with HLA B27 positive and inflammatory eye disease and probably ankylosing spondylitis.     Past Medical history   Past Medical History:   Diagnosis Date    Papanicolaou smear of cervix with low grade squamous intraepithelial lesion (LGSIL) 04/2015    colp - EUGENE I       Objective   /76 (BP Location: Left arm, Patient Position: Sitting, Cuff Size: Adult Regular)   Pulse 94   Wt 69.9 kg (154 lb)   LMP 06/28/2024 (Approximate)   SpO2 98%   BMI 27.28 kg/m        PHYSICAL EXAMINATION  Physical Exam  HENT:      Head: Normocephalic.      Mouth/Throat:      Mouth: Mucous membranes are moist.   Eyes:      Conjunctiva/sclera: Conjunctivae normal.   Pulmonary:      Effort: Pulmonary effort is normal.      Breath sounds: Normal breath sounds.   Musculoskeletal:         General: No swelling or tenderness.      Comments: No noticeable swelling or significant tenderness in the hands (MCP, PIP, DIP), wrists, elbows, knees, ankles, feet. Range of motion in the cervical spine, lumbar spine, shoulders and hips are within accepted range for age.     RUBIO test negative bilaterally    Tinel test positive for median nerve compression   Skin:     Findings: No rash.   Neurological:      Mental Status: She is alert.       Assessment & Plan      # Low back pain  # Coccygeal pain   # Abdominal pain, alteration between diarrhea and constipation  # Colon biopsy showing lymphocyte aggregates  # Carpal tunnel syndrome  # Family history of HLA-B27 positive, inflammatory eye disease, probable ankylosing  spondylitis  # HLA-B27 positive  # Elevated CRP    Stacey is a very pleasant 34-year-old female referred to rheumatology for evaluation of ankylosing spondylitis.  She has chronic low back pain and coccygeal pain, likely mechanical but she has strong family history for HLA-B27 disease [inflammatory eye disease, ankylosing spondylitis in her brother] and she is HLA-B27 positive, CRP is elevated as well.      There is no evidence of active arthritis today on exam, RUBIO test negative bilaterally.  Tinel's test is positive for carpal tunnel syndrome.  X-rays were negative for erosions in the SI joint.    She has abdominal pain with intermittent diarrhea/constipation, however given between 7 positivity and elevated CRP then reasonable to evaluate for inflammatory bowel disease.    Her joint pain seems to be mechanical however she has risk factors as above that warrant further evaluation for HLA-B27 mediated disease.    We discussed getting MR enterography, this will help to evaluate for inflammatory bowel disease as well as have images of the SI joint to evaluate for inflammatory sacroiliitis.    For carpal tunnel syndrome, we discussed using wrist splints in the night to help with her symptoms.    We also discussed warning signs suggestive of spondylarthritis for which follow-up would be indicated and this include inflammatory joint pain that include joint swelling in the peripheral joints [hands, wrists, elbows, knees, feet] that is worse in the morning and improves with activity.    We also discussed symptoms indicative of inflammatory sacroiliitis with chronic low back pain, morning stiffness that improves with activity and worsens with rest.    I will reach out to her after MRI results to discuss the next steps.    She is in agreement with this plan and seems comfortable with this approach.      57 minutes spent by me on the date of the encounter doing chart review, history and exam, documentation and further  activities per the note        Cherelle Henry MD  [facility] RHEUMATOLOGY

## 2024-07-12 ENCOUNTER — MYC MEDICAL ADVICE (OUTPATIENT)
Dept: FAMILY MEDICINE | Facility: CLINIC | Age: 34
End: 2024-07-12

## 2024-07-12 DIAGNOSIS — R10.2 PELVIC PAIN IN FEMALE: Primary | ICD-10-CM

## 2024-07-12 DIAGNOSIS — N94.10 DYSPAREUNIA IN FEMALE: ICD-10-CM

## 2024-07-26 NOTE — PATIENT INSTRUCTIONS
If you have labs or imaging done, the results will automatically release in [facility] without an interpretation.  Your health care professional will review those results and send an interpretation with recommendations as soon as possible, but this may be 1-3 business days.    If you have any questions regarding your visit, please contact your care team.     [employer]: [phone]  [facility] CLINIC HOURS TELEPHONE NUMBER   Melly Hill, ARA Cristina-SHAILESH Villatoro-SHAILESH Cannon-Surgery Scheduler  Nava-       Monday- [place]  8:00 am-4:00 pm    Tuesday- [place]  8:00 am-4:00 pm    Wednesday- [place] 8:00 am-4:00 pm    Thursday- [place] 8:00 am-4:00 pm    Friday- [place]  8:00 am-4:00 pm [facility]  [address] [place]  [place] [address]  PH: [phone]  Fax: [phone]    Imaging Scheduling all locations  PH: [phone]     [facility]  [address] [place] [address] [address].  [place], [place] [address] [phone]    [facility]  [address] [place] [address]  PH: [phone]     **Surgeries** Our Surgery Schedulers will contact you to schedule. If you do not receive a call within 3 business days, please call [phone].  Urgent Care locations:  [facility]       Monday-Friday   10 am - 8 pm    Saturday and Sunday   9 am - 5 pm   [phone]   If you need a medication refill, please contact your pharmacy. Please allow 3 business days for your refill to be completed.  As always, Thank you for trusting us with your healthcare needs!

## 2024-07-31 NOTE — PROGRESS NOTES
"Subjective:  Stacey is a 34 year old   is here today with the following concerns:    Dyspareunia: x 2 years. Reports stabbing, sharp pain during SIC with initial and deep penetration throughout entirety of SIC. She does not think it is a \"lubricant issue\" and they have not tried other positions to see if this alleviates any of the pain. She feels this pain is vaginal and not deeper into the pelvis.   Pelvic pain: reports intermittent lower left pelvic pain x 2 years. States pain appears to exacerbate with the week prior to menses. Currently, she is on COCs and getting periods every other month with skipping placebo week rotating months. She has a 10yr history of rotating constipation/diarrhea and states that she saw a GI provider once who told her she may have IBS but no further investigation, workup,  or follow up was completed. She reports the pain appears to be before having bowel issue the next day.     24 TVUS   \"Findings: The uterus measures 6.8 x 2.7 x 4.5cm. The endometrial  stripe measures 6 mm thick. Prominent indentation at the uterine  fundus, better illustrated on prior ultrasound, suggesting normal  variant arcuate versus mild septate. Single intramural fibroid along  the posterior right fundus measures 1.0 x 1.0 x 1.1 cm.     The right and left ovaries measure 2.4 x 1.4 x 2.2 cm and 3.2 x 1.2 x  2.1 cm, respectively. Both ovaries appear normal.                                                                   Impression:   1. Single intramural fibroid.  2. Normal variant arcuate uterus versus mild septate uterus, better  illustrated on ultrasound from 2016\"    ROS: Pertinent ROS as above.    Medical history  OB History    Para Term  AB Living   0 0 0 0 0 0   SAB IAB Ectopic Multiple Live Births   0 0 0 0 0      Past Medical History:   Diagnosis Date    Papanicolaou smear of cervix with low grade squamous intraepithelial lesion (LGSIL) 2015    colp - EUGENE I      Past Surgical " History:   Procedure Laterality Date    BIOPSY  2015    Cervix    EYE SURGERY      Minor removal of corneal deposit, twice, in right eye       ALL/Meds: Her medication and allergy histories were reviewed and are documented in their appropriate chart areas.    SH: Reviewed and documented in the appropriate area of the chart.  FH:  Her family history is reviewed and updated in the chart, today.  PMH: Her past medical, surgical, and obstetric histories were reviewed and updated today in the appropriate chart areas.    Objective:  PE: LMP 2024 (Approximate)   There is no height or weight on file to calculate BMI.    Pertinent Physical exam findings:    GENERAL: Active, alert, in no acute distress.  SKIN: Clear. No significant rash, abnormal pigmentation or lesions  MS: no gross musculoskeletal defects noted, no edema  PSYCH: Age-appropriate alertness and orientation    Pelvic:       - Ext: Vulva and perineum are normal without lesion, mass or discharge        - Urethra: normal without discharge or scarring        - Bladder: no tenderness, no masses       - Vagina: without discharge and rugated       - Cervix: normal, nulliparous       - Uterus: Normal shape, position and consistency       - Adnexa: Normal, no palpable masses. Some tenderness but in the vagina with speculum, not during bimanual     A/P:  Stacey Zavala is a 34 year old  here today with the following concerns:  (R10.2) Pelvic pain in female  (primary encounter diagnosis)  Comment: We discussed that there are many possibly etiologies for pelvic pain, which may include but are not limited to ovarian cyst/mass, ovarian torsion, PID, ectopic pregnancy, uterine fibroids, endometriosis, muskuloskeletal or pelvic floor dysfunction, interstitial cystitis, cystitis, IBS/IBD, chronic constipation, etc.   Based on her symptoms, MSK or PF Dysfunction, IBS, possible endometriosis are high on the differential.  We discussed possible management  options, including PT, medical and surgical therapy. After discussing options, she would like to proceed with trial of Aygestin and PFPT. Return to clinic in 2-3 months if not improving or questions.  Plan: norethindrone (AYGESTIN) 5 MG tablet, Physical         Therapy  Referral, Chlamydia         trachomatis PCR, Neisseria gonorrhoeae PCR, Wet        preparation          (N94.10) Dyspareunia in female  Comment: We discussed trial of Aygestin over current OCPs to see if this relieves any of her pelvic pain. Try for 2-3 months and reach out if no improvement. We discussed the various causes of dyspareunia such as position, vaginal dryness, endometriosis, adnexal mass, pelvic floor dysfunction, infection, PID, relationship factors, fibroids, malignancy, pelvic congestion syndrome, vulvar pain, IC/BPS, mental health. Based on her presenting history and PE, pelvic floor dysfunction, vulvar pain, and possible endometriosis are high on the list of differentials.   Plan: norethindrone (AYGESTIN) 5 MG tablet, Physical         Therapy  Referral          (Z11.3) Screening examination for venereal disease  Plan: Chlamydia trachomatis PCR, Neisseria         gonorrhoeae PCR          She is agreeable to the plan above and has no further questions or concerns. She did not request a chaperone for the physical exam component of the visit today.     RADHA Michele CNP

## 2024-08-01 ENCOUNTER — OFFICE VISIT (OUTPATIENT)
Dept: OBGYN | Facility: CLINIC | Age: 34
End: 2024-08-01
Attending: STUDENT IN AN ORGANIZED HEALTH CARE EDUCATION/TRAINING PROGRAM
Payer: COMMERCIAL

## 2024-08-01 VITALS — SYSTOLIC BLOOD PRESSURE: 119 MMHG | BODY MASS INDEX: 27.28 KG/M2 | DIASTOLIC BLOOD PRESSURE: 82 MMHG | WEIGHT: 154 LBS

## 2024-08-01 DIAGNOSIS — R10.2 PELVIC PAIN IN FEMALE: Primary | ICD-10-CM

## 2024-08-01 DIAGNOSIS — Z11.3 SCREENING EXAMINATION FOR VENEREAL DISEASE: ICD-10-CM

## 2024-08-01 DIAGNOSIS — N94.10 DYSPAREUNIA IN FEMALE: ICD-10-CM

## 2024-08-01 LAB
CLUE CELLS: ABNORMAL
TRICHOMONAS, WET PREP: ABNORMAL
WBC'S/HIGH POWER FIELD, WET PREP: ABNORMAL
YEAST, WET PREP: ABNORMAL

## 2024-08-01 PROCEDURE — 99204 OFFICE O/P NEW MOD 45 MIN: CPT

## 2024-08-01 PROCEDURE — 87210 SMEAR WET MOUNT SALINE/INK: CPT

## 2024-08-01 PROCEDURE — 87591 N.GONORRHOEAE DNA AMP PROB: CPT

## 2024-08-01 PROCEDURE — 87491 CHLMYD TRACH DNA AMP PROBE: CPT

## 2024-08-01 RX ORDER — NORETHINDRONE ACETATE 5 MG
5 TABLET ORAL DAILY
Qty: 90 TABLET | Refills: 1 | Status: SHIPPED | OUTPATIENT
Start: 2024-08-01

## 2024-08-02 LAB
C TRACH DNA SPEC QL NAA+PROBE: NEGATIVE
N GONORRHOEA DNA SPEC QL NAA+PROBE: NEGATIVE

## 2024-08-08 ENCOUNTER — TELEPHONE (OUTPATIENT)
Dept: RHEUMATOLOGY | Facility: CLINIC | Age: 34
End: 2024-08-08

## 2024-08-08 DIAGNOSIS — M53.3 SACRAL PAIN: ICD-10-CM

## 2024-08-08 DIAGNOSIS — Z15.89 HLA B27 (HLA B27 POSITIVE): Primary | ICD-10-CM

## 2024-08-08 NOTE — TELEPHONE ENCOUNTER
Call to patient to inform that MRI was cancelled as it was not approved by insurance. Writer to follow up with patient with updated plan. Patient verbalized understanding.     Ivory Wolfe RN

## 2024-08-09 NOTE — TELEPHONE ENCOUNTER
Per provider, since enterography MRI not approved we can get SI MRI only if approved by insurance. Original order appears to be cancelled. New SI MRI order pended and sent to provider for review and sign.     Ivory Wolfe RN

## 2024-09-13 ENCOUNTER — E-VISIT (OUTPATIENT)
Dept: FAMILY MEDICINE | Facility: CLINIC | Age: 34
End: 2024-09-13
Payer: COMMERCIAL

## 2024-09-13 DIAGNOSIS — R19.7 DIARRHEA, UNSPECIFIED TYPE: Primary | ICD-10-CM

## 2024-09-13 PROCEDURE — 99207 PR NO CHARGE LOS: CPT | Performed by: PHYSICIAN ASSISTANT

## 2024-09-17 ENCOUNTER — THERAPY VISIT (OUTPATIENT)
Dept: PHYSICAL THERAPY | Facility: CLINIC | Age: 34
End: 2024-09-17
Payer: COMMERCIAL

## 2024-09-17 DIAGNOSIS — R10.2 PELVIC PAIN IN FEMALE: ICD-10-CM

## 2024-09-17 DIAGNOSIS — N94.10 DYSPAREUNIA IN FEMALE: ICD-10-CM

## 2024-09-17 PROCEDURE — 97530 THERAPEUTIC ACTIVITIES: CPT | Mod: GP | Performed by: PHYSICAL THERAPIST

## 2024-09-17 PROCEDURE — 97112 NEUROMUSCULAR REEDUCATION: CPT | Mod: GP | Performed by: PHYSICAL THERAPIST

## 2024-09-17 PROCEDURE — 97161 PT EVAL LOW COMPLEX 20 MIN: CPT | Mod: GP | Performed by: PHYSICAL THERAPIST

## 2024-09-17 NOTE — PROGRESS NOTES
PHYSICAL THERAPY EVALUATION  Type of Visit: Evaluation  Stacey presents today with dyspareunia that gets up to a 7/10 and it does not get better throughout intercourse. She notes the pain feels burning and lubrication does not make a difference. She will have some discomfort with tampons.     She notes having some sacral discomfort that has continued to be present. She feels that the front of her thighs are sore when squatting so she has held off on leg exercises. She feels a lot of general tension in her body.     One pain that has been bothering her for a couple years is when she will get an intense cramp in her lower abdomen that she has associated with her bowel movements and as things are moving through. It happens on and off for a week before her periods. She just started a new medication where she will not have her period.         Fall Risk Screen:  Fall screen completed by: PT  Have you fallen 2 or more times in the past year?: No  Have you fallen and had an injury in the past year?: No  Is patient a fall risk?: No    Subjective       Presenting condition or subjective complaint: Painful intercourse  Date of onset: 08/01/24    Relevant medical history: Bladder or bowel problems; Migraines or headaches; Severe headaches   Dates & types of surgery:      Prior diagnostic imaging/testing results: Other I received an examination and ultrasound   Prior therapy history for the same diagnosis, illness or injury: No      Living Environment  Social support: With a significant other or spouse   Type of home: House   Stairs to enter the home: Yes 4 Is there a railing: Yes     Ramp: No   Stairs inside the home: Yes 15 Is there a railing: Yes     Help at home: None  Equipment owned:       Employment: Yes   Hobbies/Interests: Gardening, biking, hiking, backpacking    Patient goals for therapy:  minimizing abdominal discomfort, no pain intercourse, no leaking of urine, no waking up at night to void        Objective      PELVIC EVALUATION  ADDITIONAL HISTORY:  Sex assigned at birth: Female  Gender identity: Female    Pronouns: She/Her Hers      Bladder History:  Feels bladder filling: Yes  Triggers for feeling of inability to wait to go to the bathroom: Yes Trying to make a hard decision, making choices  How long can you wait to urinate: Sometimes it comes on fast and i have to go within a couple minutes. Sometimes i can hold it for 30 mins or longer if needed  Gets up at night to urinate: Yes 1-3, usually 1-2, she has been trying to hold it so she does not void at night  Can stop the flow of urine when urinating: No  Volume of urine usually released: Large   Other issues:    Number of bladder infections in last 12 months:    Fluid intake per day: 40 ounces 10 ounces    Medications taken for bladder: No     Activities causing urine leak: Hurrying to the bathroom due to a strong urge to urinate (pee)  - happens every week to every other week  Amount of urine typically leaked: Small  Pads used to help with leaking: No      She denies any straining, she denies any post void dribbling. No vaginal pressure/heaviness.    Bowel History:  Frequency of bowel movement: 1 per day on average, but somtimes will skip 1-3 days  Consistency of stool: Soft   bristol stool scale: type 4 and type 6-7  Ignores the urge to defecate: No  Other bowel issues: Pain when pooping; Straining to have bowel movement, the pain is more abdominal cramping  Length of time spent trying to have a bowel movement: 20-45 mins  She is very on top of having a clean diet and if she strays from that her bowel movements are worse.    Sexual Function History:  Sexual orientation: Straight    Sexually active: Yes  Lubrication used: No No  Pelvic pain:      Pain or difficulty with orgasms/erection/ejaculation: No    State of menopause: Perimenopause (have not gone through menopause yet)  Hormone medications: Yes Norethindrone 5mg    Are you currently pregnant:  "No  Number of previous pregnancies: 0  Number of deliveries: 0  Have you been diagnosed with pelvic prolapse or abdominal separation: No  Do you get regular exercise: Yes  I do this type of exercise: Strength training, cycling, walking, hiking  Have you tried pelvic floor strengthening exercises for 4 weeks: No  Do you have any history of trauma that is relevant to your care that you d like to share: No      Discussed reason for referral regarding pelvic health needs and external/internal pelvic floor muscle examination with patient/guardian.  Opportunity provided to ask questions and verbal consent for assessment and intervention was given.    POSTURE: WFL  PELVIC EXAM  External Visual Inspection:  At rest: Normal  With voluntary pelvic floor contraction: Present  Relaxation of PFM: Non-relaxation    Integumentary:   Introitus: Unremarkable    External Digital Palpation per Perineum:   Ischiocavernosis: Unremarkable  Bulbo cavernosis: Unremarkable  Transverse perineal: Unremarkable  Levator ani: Unremarkable    Internal Digital Palpation:  Per Vagina:  Tenderness  Myofascial Resistance to Palpation: Firm, Taut, pt noted \"pinching sensation with movement during assessment  Digital Muscle Performance: P (Power): 3-/5  E (Endurance): 10 seconds, no relaxation afterwards  F (Fast Twitch): 7/10  Compensations: Abdominals, Adductors, Gluts, Breath holding  Relaxation Post-Contraction: Partial/delayed relaxation, Non-relaxation    ABDOMINAL ASSESSMENT  Diastasis Rectus Abdominis (AARON):  AARON presence: No    Fascial Tension/Restriction: Hypomobile      Assessment & Plan   CLINICAL IMPRESSIONS  Medical Diagnosis: pelvic pain    Treatment Diagnosis: pelvic floor dysfunciton   Impression/Assessment: Patient is a 34 year old female with pelvic floor dysfunction complaints.  The following significant findings have been identified: Pain, Decreased ROM/flexibility, Decreased strength, Impaired muscle performance, and Decreased " activity tolerance. These impairments interfere with their ability to perform self care tasks, work tasks, recreational activities, household chores, driving , household mobility, and community mobility as compared to previous level of function.     Clinical Decision Making (Complexity):  Clinical Presentation: Stable/Uncomplicated  Clinical Presentation Rationale: based on medical and personal factors listed in PT evaluation  Clinical Decision Making (Complexity): Low complexity    PLAN OF CARE  Treatment Interventions:  Modalities: Biofeedback, Cryotherapy, Dry Needling, E-stim, Hot Pack  Interventions: Gait Training, Manual Therapy, Neuromuscular Re-education, Therapeutic Activity, Therapeutic Exercise, Self-Care/Home Management    Long Term Goals     PT Goal 1  Goal Description: Pt will have 0/10p with intercourse  Rationale: to maximize safety and independence with performance of ADLs and functional tasks  Target Date: 12/10/24      Frequency of Treatment: every 1-2 weeks  Duration of Treatment: 12 weeks    Education Assessment:   Learner/Method: Patient;No Barriers to Learning    Risks and benefits of evaluation/treatment have been explained.   Patient/Family/caregiver agrees with Plan of Care.     Evaluation Time:     PT Eval, Low Complexity Minutes (53943): 30     Signing Clinician: Melani Puga, PT

## 2024-09-18 ENCOUNTER — ANCILLARY PROCEDURE (OUTPATIENT)
Dept: MRI IMAGING | Facility: CLINIC | Age: 34
End: 2024-09-18
Attending: STUDENT IN AN ORGANIZED HEALTH CARE EDUCATION/TRAINING PROGRAM
Payer: COMMERCIAL

## 2024-09-18 DIAGNOSIS — M53.3 SACRAL PAIN: ICD-10-CM

## 2024-09-18 DIAGNOSIS — Z15.89 HLA B27 (HLA B27 POSITIVE): ICD-10-CM

## 2024-09-18 LAB — RADIOLOGIST FLAGS: NORMAL

## 2024-09-18 PROCEDURE — A9585 GADOBUTROL INJECTION: HCPCS | Performed by: RADIOLOGY

## 2024-09-18 PROCEDURE — 72197 MRI PELVIS W/O & W/DYE: CPT | Performed by: RADIOLOGY

## 2024-09-18 RX ORDER — GADOBUTROL 604.72 MG/ML
7 INJECTION INTRAVENOUS ONCE
Status: COMPLETED | OUTPATIENT
Start: 2024-09-18 | End: 2024-09-18

## 2024-09-18 RX ADMIN — GADOBUTROL 7 ML: 604.72 INJECTION INTRAVENOUS at 09:07

## 2024-09-18 NOTE — PATIENT INSTRUCTIONS
Thank you for choosing us for your care. I think an in-clinic or virtual visit would be the best next step based on your symptoms. Please schedule a clinic appointment; you won t be charged for this eVisit.      You can schedule an appointment by clicking here in [employer], or call [phone].

## 2024-09-19 ENCOUNTER — TELEPHONE (OUTPATIENT)
Dept: RHEUMATOLOGY | Facility: CLINIC | Age: 34
End: 2024-09-19
Payer: COMMERCIAL

## 2024-09-19 NOTE — TELEPHONE ENCOUNTER
"Notification of patient by radiology: accidental finding on MR SI joints, indicated in \"Impression\" dated 9/18. Radiology call back # [phone]     "

## 2024-09-20 NOTE — TELEPHONE ENCOUNTER
"Call to patient to review MRI results and recommendations per Dr. Henry:     \"Please call the patient and inform her that there was a lesion in the bone in MRI looks likely benign per radiology but needs follow up in 6 months with her primary care provider. The MRI did not show features of inflammation (Sacroiliitis) suggestive of autoimmune cause. She can follow up as needed afterward\"    Patient verbalized understanding and has no further questions.     Ivory Wolfe RN    "

## 2024-09-20 NOTE — RESULT ENCOUNTER NOTE
MRI negative for inflammatory sacroiliitis  No indication for therapy given no evidence of active spondyloarthritis     MRI however showed indeterminate focus in right cony sacrum,follow up in 6 months is requested by radiology which can be done with primary care provider    Follow up as needed or if she develop new or worsening symptoms

## 2024-10-04 ENCOUNTER — THERAPY VISIT (OUTPATIENT)
Dept: PHYSICAL THERAPY | Facility: CLINIC | Age: 34
End: 2024-10-04
Payer: COMMERCIAL

## 2024-10-04 DIAGNOSIS — R10.2 PELVIC PAIN IN FEMALE: Primary | ICD-10-CM

## 2024-10-04 PROCEDURE — 97140 MANUAL THERAPY 1/> REGIONS: CPT | Mod: GP | Performed by: PHYSICAL THERAPIST

## 2024-10-04 PROCEDURE — 97530 THERAPEUTIC ACTIVITIES: CPT | Mod: GP | Performed by: PHYSICAL THERAPIST

## 2024-10-11 ENCOUNTER — THERAPY VISIT (OUTPATIENT)
Dept: PHYSICAL THERAPY | Facility: CLINIC | Age: 34
End: 2024-10-11
Payer: COMMERCIAL

## 2024-10-11 DIAGNOSIS — R10.2 PELVIC PAIN IN FEMALE: Primary | ICD-10-CM

## 2024-10-11 PROCEDURE — 97140 MANUAL THERAPY 1/> REGIONS: CPT | Mod: GP | Performed by: PHYSICAL THERAPIST

## 2024-10-11 PROCEDURE — 97530 THERAPEUTIC ACTIVITIES: CPT | Mod: GP | Performed by: PHYSICAL THERAPIST

## 2024-11-05 ENCOUNTER — THERAPY VISIT (OUTPATIENT)
Dept: PHYSICAL THERAPY | Facility: CLINIC | Age: 34
End: 2024-11-05
Payer: COMMERCIAL

## 2024-11-05 DIAGNOSIS — R10.2 PELVIC PAIN IN FEMALE: Primary | ICD-10-CM

## 2024-11-05 PROCEDURE — 97140 MANUAL THERAPY 1/> REGIONS: CPT | Mod: GP | Performed by: PHYSICAL THERAPIST

## 2024-11-05 PROCEDURE — 97530 THERAPEUTIC ACTIVITIES: CPT | Mod: GP | Performed by: PHYSICAL THERAPIST

## 2024-11-15 ENCOUNTER — THERAPY VISIT (OUTPATIENT)
Dept: PHYSICAL THERAPY | Facility: CLINIC | Age: 34
End: 2024-11-15
Payer: COMMERCIAL

## 2024-11-15 DIAGNOSIS — R10.2 PELVIC PAIN IN FEMALE: Primary | ICD-10-CM

## 2024-11-15 PROCEDURE — 97140 MANUAL THERAPY 1/> REGIONS: CPT | Mod: GP | Performed by: PHYSICAL THERAPIST

## 2024-11-15 PROCEDURE — 97530 THERAPEUTIC ACTIVITIES: CPT | Mod: GP | Performed by: PHYSICAL THERAPIST

## 2024-11-18 ENCOUNTER — OFFICE VISIT (OUTPATIENT)
Dept: FAMILY MEDICINE | Facility: CLINIC | Age: 34
End: 2024-11-18
Payer: COMMERCIAL

## 2024-11-18 VITALS
TEMPERATURE: 98.3 F | DIASTOLIC BLOOD PRESSURE: 77 MMHG | BODY MASS INDEX: 28.88 KG/M2 | HEIGHT: 63 IN | OXYGEN SATURATION: 99 % | HEART RATE: 79 BPM | SYSTOLIC BLOOD PRESSURE: 122 MMHG | RESPIRATION RATE: 18 BRPM | WEIGHT: 163 LBS

## 2024-11-18 DIAGNOSIS — R10.2 PELVIC PAIN IN FEMALE: ICD-10-CM

## 2024-11-18 DIAGNOSIS — R53.83 FATIGUE, UNSPECIFIED TYPE: ICD-10-CM

## 2024-11-18 DIAGNOSIS — R10.13 EPIGASTRIC PAIN: ICD-10-CM

## 2024-11-18 DIAGNOSIS — Z15.89 HLA B27 (HLA B27 POSITIVE): ICD-10-CM

## 2024-11-18 DIAGNOSIS — R79.82 ELEVATED C-REACTIVE PROTEIN: ICD-10-CM

## 2024-11-18 DIAGNOSIS — M53.3 SACRAL LESION: Primary | ICD-10-CM

## 2024-11-18 DIAGNOSIS — K52.9 CHRONIC DIARRHEA: ICD-10-CM

## 2024-11-18 LAB
BASOPHILS # BLD AUTO: 0 10E3/UL (ref 0–0.2)
BASOPHILS NFR BLD AUTO: 0 %
EOSINOPHIL # BLD AUTO: 0 10E3/UL (ref 0–0.7)
EOSINOPHIL NFR BLD AUTO: 0 %
ERYTHROCYTE [DISTWIDTH] IN BLOOD BY AUTOMATED COUNT: 11.9 % (ref 10–15)
HCT VFR BLD AUTO: 42.7 % (ref 35–47)
HGB BLD-MCNC: 14.3 G/DL (ref 11.7–15.7)
IMM GRANULOCYTES # BLD: 0 10E3/UL
IMM GRANULOCYTES NFR BLD: 0 %
LYMPHOCYTES # BLD AUTO: 2.6 10E3/UL (ref 0.8–5.3)
LYMPHOCYTES NFR BLD AUTO: 40 %
MCH RBC QN AUTO: 31.8 PG (ref 26.5–33)
MCHC RBC AUTO-ENTMCNC: 33.5 G/DL (ref 31.5–36.5)
MCV RBC AUTO: 95 FL (ref 78–100)
MONOCYTES # BLD AUTO: 0.4 10E3/UL (ref 0–1.3)
MONOCYTES NFR BLD AUTO: 6 %
NEUTROPHILS # BLD AUTO: 3.6 10E3/UL (ref 1.6–8.3)
NEUTROPHILS NFR BLD AUTO: 54 %
PLATELET # BLD AUTO: 283 10E3/UL (ref 150–450)
RBC # BLD AUTO: 4.49 10E6/UL (ref 3.8–5.2)
WBC # BLD AUTO: 6.6 10E3/UL (ref 4–11)

## 2024-11-18 PROCEDURE — 86618 LYME DISEASE ANTIBODY: CPT | Performed by: STUDENT IN AN ORGANIZED HEALTH CARE EDUCATION/TRAINING PROGRAM

## 2024-11-18 PROCEDURE — 84443 ASSAY THYROID STIM HORMONE: CPT | Performed by: STUDENT IN AN ORGANIZED HEALTH CARE EDUCATION/TRAINING PROGRAM

## 2024-11-18 PROCEDURE — 86364 TISS TRNSGLTMNASE EA IG CLAS: CPT | Performed by: STUDENT IN AN ORGANIZED HEALTH CARE EDUCATION/TRAINING PROGRAM

## 2024-11-18 PROCEDURE — 83690 ASSAY OF LIPASE: CPT | Performed by: STUDENT IN AN ORGANIZED HEALTH CARE EDUCATION/TRAINING PROGRAM

## 2024-11-18 PROCEDURE — 82306 VITAMIN D 25 HYDROXY: CPT | Performed by: STUDENT IN AN ORGANIZED HEALTH CARE EDUCATION/TRAINING PROGRAM

## 2024-11-18 PROCEDURE — 36415 COLL VENOUS BLD VENIPUNCTURE: CPT | Performed by: STUDENT IN AN ORGANIZED HEALTH CARE EDUCATION/TRAINING PROGRAM

## 2024-11-18 PROCEDURE — 99214 OFFICE O/P EST MOD 30 MIN: CPT | Performed by: STUDENT IN AN ORGANIZED HEALTH CARE EDUCATION/TRAINING PROGRAM

## 2024-11-18 PROCEDURE — 86140 C-REACTIVE PROTEIN: CPT | Performed by: STUDENT IN AN ORGANIZED HEALTH CARE EDUCATION/TRAINING PROGRAM

## 2024-11-18 PROCEDURE — 85025 COMPLETE CBC W/AUTO DIFF WBC: CPT | Performed by: STUDENT IN AN ORGANIZED HEALTH CARE EDUCATION/TRAINING PROGRAM

## 2024-11-18 PROCEDURE — 82728 ASSAY OF FERRITIN: CPT | Performed by: STUDENT IN AN ORGANIZED HEALTH CARE EDUCATION/TRAINING PROGRAM

## 2024-11-18 PROCEDURE — 80053 COMPREHEN METABOLIC PANEL: CPT | Performed by: STUDENT IN AN ORGANIZED HEALTH CARE EDUCATION/TRAINING PROGRAM

## 2024-11-18 NOTE — PROGRESS NOTES
Assessment & Plan     Sacral lesion  Noted on previous MRI ordered by rheumatology. No sacroiliitis but does have sacral lesion noted, likely benign but radiology advised follow up imaging in 6 months   - MR Sacroilliac Joints wwo Contrast; Future    Chronic diarrhea  Epigastric pain  But having daily chronic diarrhea for the last 4 months.  She also has a known elevation in her CRP that she has a rheumatology for, new chronic fatigue with some associated epigastric pain.  Unclear etiology but we discussed doing a workup for differential diagnosis of celiac disease, H. pylori gastritis, inflammatory bowel disease, gallbladder disease, pancreatitis, thyroid disease, Giardia or infectious etiology, discussed IBS.  EDIT: Patient thought her dad has a diagnosis of ulcerative colitis, but actually is diverticulitis and aunt with IBS and microscopic colitis.  Recommend obtaining colonoscopy and referral to GI  - Enteric Bacteria and Virus Panel by [facility]; Future  - CRP, inflammation; Future  - Tissue transglutaminase rl IgA and IgG; Future  - TSH with free T4 reflex; Future  - CBC with platelets and differential; Future  - Comprehensive metabolic panel (BMP + Alb, Alk Phos, ALT, AST, Total. Bili, TP); Future  - Adult GI  Referral - Consult Only; Future  - Adult GI  Referral - Procedure Only; Future  - Lipase; Future  - US Abdomen Limited; Future  - Enteric Bacteria and Virus Panel by [facility]  - CRP, inflammation  - Tissue transglutaminase rl IgA and IgG  - TSH with free T4 reflex  - CBC with platelets and differential  - Comprehensive metabolic panel (BMP + Alb, Alk Phos, ALT, AST, Total. Bili, TP)  - Lipase    Elevated C-reactive protein  Fatigue, unspecified type  In addition to above recommend workup for fatigue with thyroid disease, vitamin D deficiency, anemia.  We discussed potential chronic inflammation in the intestines as a cause of iron deficiency  - CRP, inflammation; Future  - Tissue  transglutaminase rl IgA and IgG; Future  - TSH with free T4 reflex; Future  - CBC with platelets and differential; Future  - Ferritin; Future  - Vitamin D Deficiency; Future  - Comprehensive metabolic panel (BMP + Alb, Alk Phos, ALT, AST, Total. Bili, TP); Future  - Adult GI  Referral - Consult Only; Future  - Adult GI  Referral - Procedure Only; Future  - LYME DISEASE TOTAL ANTIBODIES WITH REFLEX TO CONFIRMATION; Future  - CRP, inflammation  - Tissue transglutaminase rl IgA and IgG  - TSH with free T4 reflex  - CBC with platelets and differential  - Ferritin  - Vitamin D Deficiency  - Comprehensive metabolic panel (BMP + Alb, Alk Phos, ALT, AST, Total. Bili, TP)  - LYME DISEASE TOTAL ANTIBODIES WITH REFLEX TO CONFIRMATION    Pelvic pain in female  Pelvic pain is overall improved since working with pelvic floor physical therapy, no longer having dyspareunia however is having some still lower pelvic pain that is often associated with chronic diarrhea.    HLA B27 (HLA B27 positive)  Had workup with rheumatology.  Brother also has ankylosing spondylitis.          Quita Ibarra is a 34 year old, presenting for the following health issues:  No chief complaint on file.    History of Present Illness       Reason for visit:  Diarrhea, abdominal pain, tiredness, inflammation, follow up on results of MRI  Symptom onset:  More than a month  Symptoms include:  Diarrhea, excessive tiredness, abdominal pain/sharp pains before bowel movements, sacral pain, muscle inflammation and tightness  Symptom intensity:  Severe  Symptom progression:  Worsening  Had these symptoms before:  Yes  Has tried/received treatment for these symptoms:  Yes  Previous treatment was successful:  No  What makes it worse:  Eating most foods -- especially greasy foods  What makes it better:  Eating mostly rice. Fatmata been trying low fodmap, but it seems like sticking to rice is best   She is taking medications regularly.    "      Diarrhea once/day, either liquid or shapeless, since July 2024. Has had issues with this for many years but has gotten worse since this summer. Started low FODMAP diet on 10/21/2024 and had only 3 normal BM since then otherwise still having diarrhea. One week before this started she went on a backpacking trip. Drinks out of ponds into a filtration system. No bloody or black stools. Has associated abdominal pain. Dyspareunia has improved with pelvic floor therapy but not abdominal pain. Pelvic pain can be very sharp lower. Sometimes heartburn. h     Thinks dad has ulcerative colitis, not on treatment though now.    Saw rheumatology due to back pain and inflammatory markers. Rheumatology had recommended MRI enterography given her symptoms and elevated CRP but wasn't covered by insurance.     Had issues with constipation 9 years ago. Had colonoscopy then. Had been advised a low-FODMAP diet.     Eats vegan diet.                MR Sacroiliac Joint 9/18/2024   Impression:     1. No evidence of sacroiliitis or enthesitis.     2. Indeterminate 6 mm enhancing focus in the right hemisacrum at the  level of S3, likely benign but recommend six-month follow-up to  confirm stability.            Review of Systems  Constitutional, HEENT, cardiovascular, pulmonary, gi and gu systems are negative, except as otherwise noted.      Objective    /77 (BP Location: Right arm, Cuff Size: Adult Regular)   Pulse 79   Temp 98.3  F (36.8  C) (Oral)   Resp 18   Ht 1.6 m (5' 3\")   Wt 73.9 kg (163 lb)   LMP 08/01/2024   SpO2 99%   BMI 28.87 kg/m    Body mass index is 28.87 kg/m .  Physical Exam   GENERAL: alert and no distress  EYES: Eyes grossly normal to inspection, PERRL and conjunctivae and sclerae normal  HENT: ear canals and TM's normal, nose and mouth without ulcers or lesions  NECK: no adenopathy, no asymmetry, masses, or scars  RESP: lungs clear to auscultation - no rales, rhonchi or wheezes  CV: regular rate and " rhythm, normal S1 S2, no S3 or S4, no murmur, click or rub, no peripheral edema  ABDOMEN: soft, nontender, no hepatosplenomegaly, no masses and bowel sounds normal  SKIN: no suspicious lesions or rashes  NEURO: Normal strength and tone, mentation intact and speech normal  PSYCH: mentation appears normal, affect normal/bright            Signed Electronically by: Dianne Ingram, DO

## 2024-11-19 LAB
ADV 40+41 DNA STL QL NAA+NON-PROBE: NEGATIVE
ALBUMIN SERPL BCG-MCNC: 4.6 G/DL (ref 3.5–5.2)
ALP SERPL-CCNC: 55 U/L (ref 40–150)
ALT SERPL W P-5'-P-CCNC: 22 U/L (ref 0–50)
ANION GAP SERPL CALCULATED.3IONS-SCNC: 12 MMOL/L (ref 7–15)
AST SERPL W P-5'-P-CCNC: 24 U/L (ref 0–45)
ASTRO TYP 1-8 RNA STL QL NAA+NON-PROBE: NEGATIVE
B BURGDOR IGG+IGM SER QL: 0.13
BILIRUB SERPL-MCNC: 0.2 MG/DL
BUN SERPL-MCNC: 12 MG/DL (ref 6–20)
C CAYETANENSIS DNA STL QL NAA+NON-PROBE: NEGATIVE
CALCIUM SERPL-MCNC: 9.4 MG/DL (ref 8.8–10.4)
CAMPYLOBACTER DNA SPEC NAA+PROBE: NEGATIVE
CHLORIDE SERPL-SCNC: 107 MMOL/L (ref 98–107)
CREAT SERPL-MCNC: 0.8 MG/DL (ref 0.51–0.95)
CRP SERPL-MCNC: 3.26 MG/L
CRYPTOSP DNA STL QL NAA+NON-PROBE: NEGATIVE
E COLI O157 DNA STL QL NAA+NON-PROBE: ABNORMAL
E HISTOLYT DNA STL QL NAA+NON-PROBE: NEGATIVE
EAEC ASTA GENE ISLT QL NAA+PROBE: NEGATIVE
EC STX1+STX2 GENES STL QL NAA+NON-PROBE: NEGATIVE
EGFRCR SERPLBLD CKD-EPI 2021: >90 ML/MIN/1.73M2
EPEC EAE GENE STL QL NAA+NON-PROBE: NEGATIVE
ETEC LTA+ST1A+ST1B TOX ST NAA+NON-PROBE: NEGATIVE
FERRITIN SERPL-MCNC: 31 NG/ML (ref 6–175)
G LAMBLIA DNA STL QL NAA+NON-PROBE: NEGATIVE
GLUCOSE SERPL-MCNC: 90 MG/DL (ref 70–99)
HCO3 SERPL-SCNC: 23 MMOL/L (ref 22–29)
LIPASE SERPL-CCNC: 44 U/L (ref 13–60)
NOROVIRUS GI+II RNA STL QL NAA+NON-PROBE: POSITIVE
P SHIGELLOIDES DNA STL QL NAA+NON-PROBE: NEGATIVE
POTASSIUM SERPL-SCNC: 4.4 MMOL/L (ref 3.4–5.3)
PROT SERPL-MCNC: 7.4 G/DL (ref 6.4–8.3)
RVA RNA STL QL NAA+NON-PROBE: NEGATIVE
SALMONELLA SP RPOD STL QL NAA+PROBE: NEGATIVE
SAPO I+II+IV+V RNA STL QL NAA+NON-PROBE: NEGATIVE
SHIGELLA SP+EIEC IPAH ST NAA+NON-PROBE: NEGATIVE
SODIUM SERPL-SCNC: 142 MMOL/L (ref 135–145)
TSH SERPL DL<=0.005 MIU/L-ACNC: 2.72 UIU/ML (ref 0.3–4.2)
V CHOLERAE DNA SPEC QL NAA+PROBE: NEGATIVE
VIBRIO DNA SPEC NAA+PROBE: NEGATIVE
VIT D+METAB SERPL-MCNC: 19 NG/ML (ref 20–50)
Y ENTEROCOL DNA STL QL NAA+PROBE: NEGATIVE

## 2024-11-19 PROCEDURE — 87338 HPYLORI STOOL AG IA: CPT | Performed by: STUDENT IN AN ORGANIZED HEALTH CARE EDUCATION/TRAINING PROGRAM

## 2024-11-19 PROCEDURE — 87507 IADNA-DNA/RNA PROBE TQ 12-25: CPT | Performed by: STUDENT IN AN ORGANIZED HEALTH CARE EDUCATION/TRAINING PROGRAM

## 2024-11-20 LAB
H PYLORI AG STL QL IA: NEGATIVE
TTG IGA SER-ACNC: 0.6 U/ML
TTG IGG SER-ACNC: <0.6 U/ML

## 2024-11-21 ENCOUNTER — TELEPHONE (OUTPATIENT)
Dept: GASTROENTEROLOGY | Facility: CLINIC | Age: 34
End: 2024-11-21

## 2024-11-21 NOTE — TELEPHONE ENCOUNTER
[facility] Call Center    Phone Message    May a detailed message be left on voicemail: Yes    Reason for Call: Other: Patient is currently scheduled on 12-17-24, as visit type New GI Urgent. This is outside the expected timeline for this referral. Patient has been added to the waitlist.      Action Taken: Message routed to:  Other: GI REFERRAL TRIAGE POOL     Travel Screening: Not Applicable

## 2024-11-22 ENCOUNTER — ANCILLARY PROCEDURE (OUTPATIENT)
Dept: ULTRASOUND IMAGING | Facility: CLINIC | Age: 34
End: 2024-11-22
Attending: STUDENT IN AN ORGANIZED HEALTH CARE EDUCATION/TRAINING PROGRAM
Payer: COMMERCIAL

## 2024-11-22 DIAGNOSIS — K52.9 CHRONIC DIARRHEA: ICD-10-CM

## 2024-11-22 DIAGNOSIS — R10.13 EPIGASTRIC PAIN: ICD-10-CM

## 2024-11-22 PROCEDURE — 76705 ECHO EXAM OF ABDOMEN: CPT

## 2024-11-25 NOTE — TELEPHONE ENCOUNTER
Patient is scheduled within the appropriate time frame of one month for urgent triaged referrals. No rescheduling is needed.    Scheduled with Gastroenterology (Althea Springer PA-C)  12/19/2024 at 11:30 AM     Closing encounter.      Sharlene Richards, [facility]

## 2024-12-04 ENCOUNTER — TELEPHONE (OUTPATIENT)
Dept: GASTROENTEROLOGY | Facility: CLINIC | Age: 34
End: 2024-12-04
Payer: COMMERCIAL

## 2024-12-04 NOTE — TELEPHONE ENCOUNTER
"Endoscopy Scheduling Screen    Have you had any respiratory illness or flu-like symptoms in the last 10 days?  No    What is your communication preference for Instructions and/or Bowel Prep?   MyChart    What insurance is in the chart?  Other:  [employer]    Ordering/Referring Provider:   ARABELLA SHEEHAN        (If ordering provider performs procedure, schedule with ordering provider unless otherwise instructed. )    BMI: Estimated body mass index is 28.87 kg/m  as calculated from the following:    Height as of 11/18/24: 1.6 m (5' 3\").    Weight as of 11/18/24: 73.9 kg (163 lb).     Sedation Ordered  moderate sedation.   If patient BMI > 50 do not schedule in [facility].    If patient BMI > 45 do not schedule at [facility].    Are you taking methadone or Suboxone?  NO, No RN review required.    Have you been diagnosed and are being treated for severe PTSD or severe anxiety?  NO, No RN review required.    Are you taking any prescription medications for pain 3 or more times per week?   NO, No RN review required.    Do you have a history of malignant hyperthermia?  No    (Females) Are you currently pregnant?   No     Have you been diagnosed or told you have pulmonary hypertension?   No    Do you have an LVAD?  No    Have you been told you have moderate to severe sleep apnea?  No.    Have you been told you have COPD, asthma, or any other lung disease?  No    Do you have any heart conditions?  No     Have you ever had or are you waiting for an organ transplant?  No. Continue scheduling, no site restrictions.    Have you had a stroke or transient ischemic attack (TIA aka \"mini stroke\" in the last 6 months?   No    Have you been diagnosed with or been told you have cirrhosis of the liver?   No.    Are you currently on dialysis?   No    Do you need assistance transferring?   No    BMI: Estimated body mass index is 28.87 kg/m  as calculated from the following:    Height as of 11/18/24: 1.6 m (5' 3\").    Weight as of 11/18/24: 73.9 kg " (163 lb).     Is patients BMI > 40 and scheduling location [facility]?  No    Do you take an injectable or oral medication for weight loss or diabetes (excluding insulin)?  No    Do you take the medication Naltrexone?  No    Do you take blood thinners?  No       Prep   Are you currently on dialysis or do you have chronic kidney disease?  No    Do you have a diagnosis of diabetes?  No    Do you have a diagnosis of cystic fibrosis (CF)?  No    On a regular basis do you go 3 -5 days between bowel movements?  No    BMI > 40?  No    Preferred Pharmacy:        [id number] - [place], [place] - [address]  [address] [address]  Phone: [phone] Fax: [phone]      Final Scheduling Details     Procedure scheduled  Colonoscopy    Surgeon:  Ambrosio     Date of procedure:  12/11     Pre-OP / PAC:   No - Not required for this site.    Location  [facility] - [facility] - Patient preference.    Sedation   Moderate Sedation - Patient preference.      Patient Reminders:   You will receive a call from a Nurse to review instructions and health history.  This assessment must be completed prior to your procedure.  Failure to complete the Nurse assessment may result in the procedure being cancelled.      On the day of your procedure, please designate an adult(s) who can drive you home stay with you for the next 24 hours. The medicines used in the exam will make you sleepy. You will not be able to drive.      You cannot take public transportation, ride share services, or non-medical taxi service without a responsible caregiver.  Medical transport services are allowed with the requirement that a responsible caregiver will receive you at your destination.  We require that drivers and caregivers are confirmed prior to your procedure.

## 2024-12-06 ENCOUNTER — TELEPHONE (OUTPATIENT)
Dept: PHYSICAL THERAPY | Facility: CLINIC | Age: 34
End: 2024-12-06
Payer: COMMERCIAL

## 2024-12-06 DIAGNOSIS — K76.9 LIVER LESION: Primary | ICD-10-CM

## 2024-12-06 NOTE — TELEPHONE ENCOUNTER
Had a long peer to peer to discussion regarding denial. They had denied the CT liver w/ and w/o because on their end it comes up as a CT abd/pelvis and she doesn't need the pelvis component. I am unable to cancel the CT liver because this is scheduled but was able to order the new CT abdomen -- can we make sure radiology changes the test that is scheduled for 12/16. I made a note on the order.     Thank you,     Dianne Ingram D.O.      US  IMPRESSION:      1. Probable benign hemangioma in the right lobe of the liver. Liver  protocol CT or MR could be considered for verification.  2. Otherwise negative right upper quadrant ultrasound.

## 2024-12-09 ENCOUNTER — TELEPHONE (OUTPATIENT)
Dept: GASTROENTEROLOGY | Facility: CLINIC | Age: 34
End: 2024-12-09
Payer: COMMERCIAL

## 2024-12-09 NOTE — TELEPHONE ENCOUNTER
Called imaging and they have put note in liver CT order not to schedule.    Patient is scheduled for CT abdomen only on 12/16/24.    Christine M Klisch, RN

## 2024-12-11 ENCOUNTER — HOSPITAL ENCOUNTER (OUTPATIENT)
Facility: AMBULATORY SURGERY CENTER | Age: 34
Discharge: HOME | End: 2024-12-11
Attending: COLON & RECTAL SURGERY
Payer: COMMERCIAL

## 2024-12-11 VITALS
SYSTOLIC BLOOD PRESSURE: 109 MMHG | HEART RATE: 56 BPM | TEMPERATURE: 97.7 F | RESPIRATION RATE: 18 BRPM | DIASTOLIC BLOOD PRESSURE: 68 MMHG | OXYGEN SATURATION: 98 %

## 2024-12-11 LAB — COLONOSCOPY: NORMAL

## 2024-12-11 RX ORDER — LIDOCAINE 40 MG/G
CREAM TOPICAL
Status: DISCONTINUED | OUTPATIENT
Start: 2024-12-11 | End: 2024-12-12 | Stop reason: HOSPADM

## 2024-12-11 RX ORDER — FLUMAZENIL 0.1 MG/ML
0.2 INJECTION, SOLUTION INTRAVENOUS
Status: ACTIVE | OUTPATIENT
Start: 2024-12-11 | End: 2024-12-11

## 2024-12-11 RX ORDER — NALOXONE HYDROCHLORIDE 0.4 MG/ML
0.4 INJECTION, SOLUTION INTRAMUSCULAR; INTRAVENOUS; SUBCUTANEOUS
Status: DISCONTINUED | OUTPATIENT
Start: 2024-12-11 | End: 2024-12-12 | Stop reason: HOSPADM

## 2024-12-11 RX ORDER — ONDANSETRON 4 MG/1
4 TABLET, ORALLY DISINTEGRATING ORAL EVERY 6 HOURS PRN
Status: DISCONTINUED | OUTPATIENT
Start: 2024-12-11 | End: 2024-12-12 | Stop reason: HOSPADM

## 2024-12-11 RX ORDER — FENTANYL CITRATE 50 UG/ML
INJECTION, SOLUTION INTRAMUSCULAR; INTRAVENOUS PRN
Status: DISCONTINUED | OUTPATIENT
Start: 2024-12-11 | End: 2024-12-11 | Stop reason: HOSPADM

## 2024-12-11 RX ORDER — ONDANSETRON 2 MG/ML
4 INJECTION INTRAMUSCULAR; INTRAVENOUS
Status: DISCONTINUED | OUTPATIENT
Start: 2024-12-11 | End: 2024-12-12 | Stop reason: HOSPADM

## 2024-12-11 RX ORDER — ONDANSETRON 2 MG/ML
4 INJECTION INTRAMUSCULAR; INTRAVENOUS EVERY 6 HOURS PRN
Status: DISCONTINUED | OUTPATIENT
Start: 2024-12-11 | End: 2024-12-12 | Stop reason: HOSPADM

## 2024-12-11 RX ORDER — NALOXONE HYDROCHLORIDE 0.4 MG/ML
0.2 INJECTION, SOLUTION INTRAMUSCULAR; INTRAVENOUS; SUBCUTANEOUS
Status: DISCONTINUED | OUTPATIENT
Start: 2024-12-11 | End: 2024-12-12 | Stop reason: HOSPADM

## 2024-12-11 RX ORDER — PROCHLORPERAZINE MALEATE 10 MG
10 TABLET ORAL EVERY 6 HOURS PRN
Status: DISCONTINUED | OUTPATIENT
Start: 2024-12-11 | End: 2024-12-12 | Stop reason: HOSPADM

## 2024-12-11 NOTE — H&P
Pre-Endoscopy History and Physical     Stacey Zavala MRN# [id number]   YOB: 1990 Age: 34 year old     Date of Procedure: 12/11/24  Primary care provider: Dianne Ingram  Type of Endoscopy: Colonoscopy  Reason for Procedure: diagnostic  Type of Anesthesia Anticipated: Moderate Sedation    HPI:    Stacey is a 34 year old female who will be undergoing the above procedure.      Prior colonoscopy: 2016    A history and physical has been performed. The patient's medications and allergies have been reviewed. The risks and benefits of the procedure and the sedation options and risks were discussed with the patient.  All questions were answered and informed consent was obtained.  She has been having diarrhea x 6 months and then cramping as well.     She denies a personal or family history of anesthesia complications or bleeding disorders. Grandfather had colon cancer in his 80s. No other family history of CRC or IBD.    Allergies   Allergen Reactions    Lactose       Allergy to Latex: NO   Allergy to tape: NO   Intolerances: NO     Current Outpatient Medications   Medication Sig Dispense Refill    norethindrone (AYGESTIN) 5 MG tablet Take 1 tablet (5 mg) by mouth daily 90 tablet 1    bisacodyl (DULCOLAX) 5 MG EC tablet Take 2 tablets at 3 pm the day before your procedure. If your procedure is before 11 am, take 2 additional tablets at 11 pm. If your procedure is after 11 am, take 2 additional tablets at 6 am. For additional instructions refer to your colonoscopy prep instructions. 4 tablet 0    cholecalciferol (VITAMIN D3) 25 mcg (1000 units) capsule Take 1 capsule by mouth daily      Cyanocobalamin (B-12) 1000 MCG CAPS Take 1,000 mcg by mouth daily      multivitamin w/minerals (MULTI-VITAMIN) tablet Take 1 tablet by mouth daily      norethindrone-ethinyl estradiol-iron (JUNEL FE 1.5/30) 1.5-30 MG-MCG tablet TAKE 1 TABLET BY MOUTH DAILY. SKIP PLACEBO EVERY OTHER MONTH 84 tablet 3    omega 3 1000 MG  CAPS       polyethylene glycol (GOLYTELY) 236 g suspension The night before the exam at 6 pm drink an 8-ounce glass every 15 minutes until the jug is half empty. If you arrive before 11 AM: Drink the other half of the Golytely jug at 11 PM night before procedure. If you arrive after 11 AM: Drink the other half of the Golytely jug at 6 AM day of procedure. For additional instructions refer to your colonoscopy prep instructions. 4000 mL 0     Current Facility-Administered Medications   Medication Dose Route Frequency Provider Last Rate Last Admin    lidocaine (LMX4) kit   Topical Q1H PRLidia Claudio MD        lidocaine 1 % 0.1-1 mL  0.1-1 mL Other Q1H PRLidia Claudio MD        ondansetron (ZOFRAN) injection 4 mg  4 mg Intravenous Once PRLidia Claudio MD        sodium chloride (PF) 0.9% PF flush 3 mL  3 mL Intracatheter Q8H Lidia Valente MD        sodium chloride (PF) 0.9% PF flush 3 mL  3 mL Intracatheter q1 min prLidia Claudio MD           Patient Active Problem List   Diagnosis    Papanicolaou smear of cervix with low grade squamous intraepithelial lesion (LGSIL)    Septate uterus    Family history of ankylosing spondylitis    Migraine without aura and without status migrainosus, not intractable    HLA B27 (HLA B27 positive)    Sacral pain    Pelvic pain in female        Past Medical History:   Diagnosis Date    Papanicolaou smear of cervix with low grade squamous intraepithelial lesion (LGSIL) 04/2015    colp - EUGENE I        Past Surgical History:   Procedure Laterality Date    BIOPSY  April 2015    Cervix    EYE SURGERY  2013    Minor removal of corneal deposit, twice, in right eye       Social History     Tobacco Use    Smoking status: Never    Smokeless tobacco: Never   Substance Use Topics    Alcohol use: Not Currently     Comment: Minimal       Family History   Problem Relation Age of Onset    Diabetes Father     Hyperlipidemia Father     GERD Father          "Heartburn    Obesity Father     Depression Maternal Grandmother     Colon Cancer Paternal Grandfather     Skin Cancer Paternal Aunt     Depression Brother     Anxiety Disorder Brother     Genetic Disorder Brother         Tested positive for HLA-B27. Doctors suspect  Ankylosing Spondylitis, or something similar       REVIEW OF SYSTEMS:     5 point ROS negative except as noted above in HPI, including Gen., Resp., CV, GI &  system review.      PHYSICAL EXAM:   /61   Pulse 66   Temp 97.7  F (36.5  C) (Temporal)   Resp 18   LMP 08/01/2024   SpO2 100%  Estimated body mass index is 28.87 kg/m  as calculated from the following:    Height as of 11/18/24: 1.6 m (5' 3\").    Weight as of 11/18/24: 73.9 kg (163 lb).   All Vitals have been reviewed.    GENERAL APPEARANCE: healthy and alert  MENTAL STATUS: alert  AIRWAY EXAM: Mallampatti Class I (visualization of the soft palate, fauces, uvula, anterior and posterior pillars)  RESP: lungs clear to auscultation - no rales, rhonchi or wheezes  CV: regular rates and rhythm      IMPRESSION   ASA Class 1 - Healthy patient, no medical problems        PLAN:     Plan for colonoscopy. We discussed the risks, benefits and alternatives and the patient wished to proceed.    The above has been forwarded to the consulting provider.    Lidia Valente MD, MS, FACS, [facility]  [facility]  Office: [phone]  12/11/2024 9:55 AM          "

## 2024-12-16 ENCOUNTER — HOSPITAL ENCOUNTER (OUTPATIENT)
Dept: CT IMAGING | Facility: HOSPITAL | Age: 34
Discharge: HOME | End: 2024-12-16
Attending: STUDENT IN AN ORGANIZED HEALTH CARE EDUCATION/TRAINING PROGRAM | Admitting: STUDENT IN AN ORGANIZED HEALTH CARE EDUCATION/TRAINING PROGRAM
Payer: COMMERCIAL

## 2024-12-16 DIAGNOSIS — K76.9 LIVER LESION: ICD-10-CM

## 2024-12-16 PROCEDURE — 250N000009 HC RX 250: Performed by: STUDENT IN AN ORGANIZED HEALTH CARE EDUCATION/TRAINING PROGRAM

## 2024-12-16 PROCEDURE — 250N000011 HC RX IP 250 OP 636: Performed by: STUDENT IN AN ORGANIZED HEALTH CARE EDUCATION/TRAINING PROGRAM

## 2024-12-16 PROCEDURE — 74170 CT ABD WO CNTRST FLWD CNTRST: CPT

## 2024-12-16 RX ORDER — IOPAMIDOL 755 MG/ML
80 INJECTION, SOLUTION INTRAVASCULAR ONCE
Status: COMPLETED | OUTPATIENT
Start: 2024-12-16 | End: 2024-12-16

## 2024-12-16 RX ADMIN — IOPAMIDOL 80 ML: 755 INJECTION, SOLUTION INTRAVENOUS at 17:33

## 2024-12-16 RX ADMIN — SODIUM CHLORIDE 90 ML: 9 INJECTION, SOLUTION INTRAVENOUS at 17:35

## 2024-12-17 DIAGNOSIS — D71 CHRONIC GRANULOMATOUS DISEASE (H): Primary | ICD-10-CM

## 2024-12-19 ENCOUNTER — OFFICE VISIT (OUTPATIENT)
Dept: GASTROENTEROLOGY | Facility: CLINIC | Age: 34
End: 2024-12-19
Attending: STUDENT IN AN ORGANIZED HEALTH CARE EDUCATION/TRAINING PROGRAM
Payer: COMMERCIAL

## 2024-12-19 VITALS
DIASTOLIC BLOOD PRESSURE: 73 MMHG | SYSTOLIC BLOOD PRESSURE: 119 MMHG | HEART RATE: 76 BPM | HEIGHT: 64 IN | OXYGEN SATURATION: 98 % | WEIGHT: 164.1 LBS | BODY MASS INDEX: 28.01 KG/M2

## 2024-12-19 DIAGNOSIS — R53.83 FATIGUE, UNSPECIFIED TYPE: ICD-10-CM

## 2024-12-19 DIAGNOSIS — R10.13 EPIGASTRIC PAIN: ICD-10-CM

## 2024-12-19 DIAGNOSIS — R79.82 ELEVATED C-REACTIVE PROTEIN: ICD-10-CM

## 2024-12-19 DIAGNOSIS — K52.9 CHRONIC DIARRHEA: Primary | ICD-10-CM

## 2024-12-19 DIAGNOSIS — R19.8 POSITIVE MURPHY'S SIGN: ICD-10-CM

## 2024-12-19 ASSESSMENT — PAIN SCALES - GENERAL: PAINLEVEL_OUTOF10: NO PAIN (0)

## 2024-12-19 NOTE — LETTER
12/19/2024      Stacey Zavala  [address] [facility] [id number]      Dear Colleague,    Thank you for referring your patient, tSacey Zavala, to the [facility] GASTRO[facility]OLOGY [facility]INIC [place]. Please see a copy of my visit note below.      GI CLINIC VISIT    CC/REFERRING MD:  Dianne Ingram  REASON FOR CONSULTATION:   K52.9 (ICD-10-CM) - Chronic diarrhea   R79.82 (ICD-10-CM) - Elevated C-reactive protein   R53.83 (ICD-10-CM) - Fatigue, unspecified type     ASSESSMENT/PLAN:  Stacey Zavala is a 34 year old year old female with PMHx of listed lactose intolerance, HLA B27 positive, pelvic floor dysfunction (in PT) following with the [facility] GI group for diarrhea/changes in stooling x 5 mo. All her life she had trouble with constipation (upwards of no BM for 5d) but mid 2024, she shifted to a predominate diarrhea pattern that was partially responsive to a low fodmap diet. No bloody or black stools. Weight stable.     She had work up at her primary to include CRP to 7s, normal CBC, CMP, TSH. 12/2024 CTAP showed no bowel inflammation, obstruction or explanation for the change in BMs. It did show signs of chronic granulomatous disease so she will be undergoing evaluation with immunology.     Ultimately, a colonoscopy was done 12/2024 that was unremarkable with NEG random colon biopsies. Terminal ileum was normal.     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 Diarrhea suggestive of steatorrhea - possible malabsorptive process ongoing - eval with fecal fat and fecal elastase (CTAP W contrast with normal appearing pancreas). She had eliminated gluten prior to her TTG serologies so its possible this value could be a false negative. We agreed to repeat TTG Ab after a 14d gluten challenge. With HLA B27 POS history, IBD is on ddx. Recent high quality Cscope (TI  evaluation) with negative random colon biopsies ruled out colonic disease but also considering other sites of inflammation; particularly SB - so will eval with MRE and fecal calpro today . Chronic infection also a possibility - recent enteric panel was NEG (including giardia) and while not as likely, will r/o c.diff too . Lastly, she does have known pelvic floor dysfunction and is currently in PT biofeedback therapy. We can consider dedicated defecography studies in the future . DDX to also include carbohydrate malabsorption, SIBO/IMO vs other     -She wants to hold on EGD for now  -MRE ordered for eval SB for inflammation/disease given continued abd pain/diarrhea,NEG Cscope and HLA B27 history. Can consider CTE.   -c.diff  -fecal calpro   -fecal fat (72h) + fecal elastase ordered   -celiac serologies with total IgA after 14d gluten challenge.   -dietary consult to assist with reintroduction phase of low fodmap diet. Vegan   -start of daily fiber    Future consideration  1.EGD, if agreeable. If pursued, would recommend duodenal biopsies. Consider HLA genotyping for celiac permissive genes   2. Consider utility of VCE, though no HÉCTOR, ferritin was 31  3. Nutrient markers to be considered  4. Breath testing   5. Trials of holding gluten/dairy x 2 weeks each with diary  6. Empiric rifaximin vs SIBO testing   7. Bowel clean out   8. Imodium, BAS  9. PF eval with dedicated defecrography studies    #POS Martinez's Sign  2024 RUQ US WNL. Hepatic function normal. Vitals stable and in NAD  -follow with HIDA; a remote relative with CCY    Orders Placed This Encounter   Procedures     MR Enterography wo and w Contrast     NM Hepatobiliary Scan with GB EF and/or Pharm     Tissue transglutaminase rl IgA and IgG     IgA     Calprotectin Feces     Elastase Fecal     Fat Stool Qual Random Collection     Adult Nutrition  Referral     C. difficile Toxin B PCR with reflex to C. difficile Antigen and Toxins A/B EIA  "    Colorectal cancer screenin, unless otherwise clinically indicated     RTC - 8-10 wk or sooner PRN     Thank you for this consultation.  It was a pleasure to participate in the care of this patient; please contact me with any further questions.     Althea Springer PA-C    Follow up: As planned above. Today, I personally spent 45 minutes in direct face to face time with the patient, of which greater than 50% of the time was spent in patient education and counseling as described above. Approximately 30 minutes were spent on indirect care associated with the patient's consultation including but not limited to review of: patient medical records to date, clinic visits, hospital records, lab results, imaging studies, procedural documentation, and coordinating care with other providers. The findings from this review are summarized in the above note. All of the above accounted for a cumulative time of 75 minutes and was performed on the date of service.     MARK Zavala is a 34 year old year old female with PMHx of listed lactose intolerance, HLA B27 positive, pelvic floor dysfunction (in PT) following with the [facility] GI group for diarrhea/changes in stooling    10+ years ago, had a lot of trouble with constipation (requiring laxatives as a child; going upwards of no BM for 5+d). When she did stool, she passed a hard firm stool then a lot of diarrhea. Would spend up to 1 hour on the toilet for these stools - accompanied by have dry heaves, nausea. Then cycle repeats    She does not typically have that above bowel pattern any longer  In 2024, she recognized a noticeable change in stooling pattern -  of having persistent diarrhea. She'd have a few days of frequent loose stools then days with better formed stools     In past 5 months, she'd Bms that look like \"piles of dirt\" and then followed by bouts of diarrhea.   -no bloody or black stools  -does see oily sheen to surface of stools  -sees occasional food " particles  -notices more increased mucus in stools    Last 3 days, her stools have been normal appearing, good consistency     Some associated sx of gassiness/bloat  Appetite/weight stable  No nausea/vomiting    Went low fodmap for a bit, helped initially but not sure if its as effective now. Vegan. Needs to reintroduce foods but process is overwhelming (in general)    Recent Cscope - high quality with TI evaluation. Normal macro exam. Random colon biopsies NEG for MC, no IBD.     No known FHX of IBD, celiac disease     Pelvic pain - sharp, severe episodes with onset 2 years. Linked with Bms and a subsequent change in stool consistency (looser)  She also has overall achy abd pain , generalized    In past 6 mo, having multiple days in a week of heartburn sx. Tums helps. 11/2024 H.pylori NEG. Never been on PPI.  -having some intermittent epigastric pain, unclear onset. Mild, 1-2 episodes a week, radiates down abdomen, midline    -No dysphagia, odynphagia, trouble w/ initiation of a swallow, melena, unintentional weight loss, nausea/vomiting    Undergoing evaluation for possible chronic granulomatous disease - CTAP with finding of multiple calcified splenic granulomas. Appt with immunology to be set.     Seen with rheum (7/2024) for chronic back pain and coccygeal pain (both thought mechanical). MR SI joint without sacroiliitis or enthesitis.     Seen with GYN (8/2024) for ongoing pelvic pain thought MSK vs PF dysfunction, possible endometriosis, IBS. Started on aygestin OCP and sent to PT    Seen with PT for pelvic pain/dysfunction and currently completing biofeedback therapy    Family Hx  Paternal Grandfather - CRC w/ colostomy bag, dx in his 60s   Paternal Aunt - IBS-D   Maternal Aunt - s/p CCY   Dad - diverticular disease, acid reflux   Brother - ankylosing spondylitis     No other known family history or GI related malignancy (esophageal, gastric, pancreatic, liver or colon) or family history of IBD/celiac  disease.     Social Hx   Occassional use of NSAIDs or Tylenol now. Prolonged ibuprofen use in 2020    No ETOH  Never tobacco products   No recreational drug use     Vegan     PROBLEM LIST  Patient Active Problem List    Diagnosis Date Noted     Pelvic pain in female 09/17/2024     Priority: Medium     Sacral pain 05/03/2024     Priority: Medium     HLA B27 (HLA B27 positive) 04/05/2024     Priority: Medium     Family history of ankylosing spondylitis 03/08/2023     Priority: Medium     Migraine without aura and without status migrainosus, not intractable 03/08/2023     Priority: Medium     Septate uterus 04/14/2016     Priority: Medium     Papanicolaou smear of cervix with low grade squamous intraepithelial lesion (LGSIL) 04/01/2015     Priority: Medium     Hx of LSIL Pap  4/2015: colp - EUGENE I (outside records, recommended pap in 6 months)  10/22/15: pap - NIL . Plan pap in 6 months.   4/14/16: Pap - NIL. Plan Pap in 1 year per provider.  06/04/18 Patient is lost to pap tracking follow-up.  06/26/19: NIL Pap. Plan cotest in 3 years.    3/8/23 NIL Pap, Neg HR HPV. Plan: Routine screening.          PERTINENT PAST MEDICAL HISTORY:  Past Medical History:   Diagnosis Date     Papanicolaou smear of cervix with low grade squamous intraepithelial lesion (LGSIL) 04/2015    colp - EUGENE I       PREVIOUS SURGERIES:  Past Surgical History:   Procedure Laterality Date     BIOPSY  April 2015    Cervix     EYE SURGERY  2013    Minor removal of corneal deposit, twice, in right eye       ALLERGIES:     Allergies   Allergen Reactions     Lactose        PERTINENT MEDICATIONS:    Current Outpatient Medications:      bisacodyl (DULCOLAX) 5 MG EC tablet, Take 2 tablets at 3 pm the day before your procedure. If your procedure is before 11 am, take 2 additional tablets at 11 pm. If your procedure is after 11 am, take 2 additional tablets at 6 am. For additional instructions refer to your colonoscopy prep instructions., Disp: 4 tablet, Rfl:  0     cholecalciferol (VITAMIN D3) 25 mcg (1000 units) capsule, Take 1 capsule by mouth daily, Disp: , Rfl:      Cyanocobalamin (B-12) 1000 MCG CAPS, Take 1,000 mcg by mouth daily, Disp: , Rfl:      multivitamin w/minerals (MULTI-VITAMIN) tablet, Take 1 tablet by mouth daily, Disp: , Rfl:      norethindrone (AYGESTIN) 5 MG tablet, Take 1 tablet (5 mg) by mouth daily, Disp: 90 tablet, Rfl: 1     norethindrone-ethinyl estradiol-iron (JUNEL FE 1.5/30) 1.5-30 MG-MCG tablet, TAKE 1 TABLET BY MOUTH DAILY. SKIP PLACEBO EVERY OTHER MONTH, Disp: 84 tablet, Rfl: 3     omega 3 1000 MG CAPS, , Disp: , Rfl:      polyethylene glycol (GOLYTELY) 236 g suspension, The night before the exam at 6 pm drink an 8-ounce glass every 15 minutes until the jug is half empty. If you arrive before 11 AM: Drink the other half of the Golytely jug at 11 PM night before procedure. If you arrive after 11 AM: Drink the other half of the Golytely jug at 6 AM day of procedure. For additional instructions refer to your colonoscopy prep instructions., Disp: 4000 mL, Rfl: 0    SOCIAL HISTORY:  Social History     Socioeconomic History     Marital status: Single     Spouse name: Not on file     Number of children: Not on file     Years of education: Not on file     Highest education level: Not on file   Occupational History     Not on file   Tobacco Use     Smoking status: Never     Smokeless tobacco: Never   Vaping Use     Vaping status: Never Used   Substance and Sexual Activity     Alcohol use: Not Currently     Comment: Minimal     Drug use: Never     Sexual activity: Not Currently     Partners: Male     Birth control/protection: Abstinence, Pill     Comment: I would like a new birth control prescription   Other Topics Concern     Parent/sibling w/ CABG, MI or angioplasty before 65F 55M? No   Social History Narrative     Not on file     Social Drivers of Health     Financial Resource Strain: Low Risk  (3/23/2024)    Financial Resource Strain      Within  the past 12 months, have you or your family members you live with been unable to get utilities (heat, electricity) when it was really needed?: No   Food Insecurity: Low Risk  (3/23/2024)    Food Insecurity      Within the past 12 months, did you worry that your food would run out before you got money to buy more?: No      Within the past 12 months, did the food you bought just not last and you didn t have money to get more?: No   Transportation Needs: Low Risk  (3/23/2024)    Transportation Needs      Within the past 12 months, has lack of transportation kept you from medical appointments, getting your medicines, non-medical meetings or appointments, work, or from getting things that you need?: No   Physical Activity: Sufficiently Active (3/23/2024)    Exercise Vital Sign      Days of Exercise per Week: 6 days      Minutes of Exercise per Session: 60 min   Stress: Stress Concern Present (3/23/2024)    [employer] [place] [employer] - Occupational Stress Questionnaire      Feeling of Stress : To some extent   Social Connections: Unknown (3/23/2024)    Social Connection and Isolation Panel [NHANES]      Frequency of Communication with Friends and Family: Not on file      Frequency of Social Gatherings with Friends and Family: Once a week      Attends Jain Services: Not on file      Active Member of Clubs or Organizations: Not on file      Attends Club or Organization Meetings: Not on file      Marital Status: Not on file   Interpersonal Safety: Unknown (12/11/2024)    Interpersonal Safety      Do you feel physically and emotionally safe where you currently live?: Patient unable to answer      Within the past 12 months, have you been hit, slapped, kicked or otherwise physically hurt by someone?: Patient unable to answer      Within the past 12 months, have you been humiliated or emotionally abused in other ways by your partner or ex-partner?: Patient unable to answer   Housing Stability: Low Risk   (3/23/2024)    Housing Stability      Do you have housing? : Yes      Are you worried about losing your housing?: No       FAMILY HISTORY:  Family History   Problem Relation Age of Onset     Diabetes Father      Hyperlipidemia Father      GERD Father         Heartburn     Obesity Father      Depression Maternal Grandmother      Colon Cancer Paternal Grandfather      Skin Cancer Paternal Aunt      Depression Brother      Anxiety Disorder Brother      Genetic Disorder Brother         Tested positive for HLA-B27. Doctors suspect  Ankylosing Spondylitis, or something similar       Past/family/social history reviewed and no changes    PHYSICAL EXAMINATION:  Vitals reviewed: LMP 08/01/2024   Constitutional: aaox3, cooperative, pleasant, not dyspneic/diaphoretic, no acute distress  Eyes: Sclera anicteric/injected  Ears/nose/mouth/throat: hearing intact  Neck: supple, active ROM w/o limitation or pain   CV: No edema  Respiratory: Unlabored breathing  Abd: Nondistended, mild tender to lower abdomen,  no peritoneal signs, POS Martinez's sign  Skin: warm, perfused, no jaundice  Psych: Normal affect  MSK: Normal gait     PERTINENT STUDIES:    Office Visit on 11/18/2024   Component Date Value Ref Range Status     Campylobacter species 11/19/2024 Negative  Negative Final     Salmonella species 11/19/2024 Negative  Negative Final     Vibrio species 11/19/2024 Negative  Negative Final     Vibrio cholerae 11/19/2024 Negative  Negative Final     Yersinia enterocolitica 11/19/2024 Negative  Negative Final     Enteropathogenic E. coli (EPEC) 11/19/2024 Negative  Negative, NA Final     Shiga-like toxin-producing E. coli* 11/19/2024 Negative  Negative Final     Shigella/Enteroinvasive E. coli (E* 11/19/2024 Negative  Negative Final     Cryptosporidium species 11/19/2024 Negative  Negative Final     Giardia lamblia 11/19/2024 Negative  Negative Final     Norovirus Gl/Gll 11/19/2024 Positive (A)  Negative Final     Rotavirus A 11/19/2024  Negative  Negative Final     Plesiomonas shigelloides 11/19/2024 Negative  Negative Final     Enteroaggregative E. coli (EAEC) 11/19/2024 Negative  Negative Final     Enterotoxigenic E. coli (ETEC) 11/19/2024 Negative  Negative Final     E. coli O157 11/19/2024 NA  Negative, NA Final     Cyclospora cayetanensis 11/19/2024 Negative  Negative Final     Entamoeba histolytica 11/19/2024 Negative  Negative Final     Adenovirus F40/41 11/19/2024 Negative  Negative Final     Astrovirus 11/19/2024 Negative  Negative Final     Sapovirus 11/19/2024 Negative  Negative Final     CRP Inflammation 11/18/2024 3.26  <5.00 mg/L Final     Tissue Transglutaminase Antibody I* 11/18/2024 0.6  <7.0 U/mL Final     Tissue Transglutaminase Antibody I* 11/18/2024 <0.6  <7.0 U/mL Final     TSH 11/18/2024 2.72  0.30 - 4.20 uIU/mL Final     Ferritin 11/18/2024 31  6 - 175 ng/mL Final     Vitamin D, Total (25-Hydroxy) 11/18/2024 19 (L)  20 - 50 ng/mL Final     Sodium 11/18/2024 142  135 - 145 mmol/L Final     Potassium 11/18/2024 4.4  3.4 - 5.3 mmol/L Final     Carbon Dioxide (CO2) 11/18/2024 23  22 - 29 mmol/L Final     Anion Gap 11/18/2024 12  7 - 15 mmol/L Final     Urea Nitrogen 11/18/2024 12.0  6.0 - 20.0 mg/dL Final     Creatinine 11/18/2024 0.80  0.51 - 0.95 mg/dL Final     GFR Estimate 11/18/2024 >90  >60 mL/min/1.73m2 Final     Calcium 11/18/2024 9.4  8.8 - 10.4 mg/dL Final     Chloride 11/18/2024 107  98 - 107 mmol/L Final     Glucose 11/18/2024 90  70 - 99 mg/dL Final     Alkaline Phosphatase 11/18/2024 55  40 - 150 U/L Final     AST 11/18/2024 24  0 - 45 U/L Final     ALT 11/18/2024 22  0 - 50 U/L Final     Protein Total 11/18/2024 7.4  6.4 - 8.3 g/dL Final     Albumin 11/18/2024 4.6  3.5 - 5.2 g/dL Final     Bilirubin Total 11/18/2024 0.2  <=1.2 mg/dL Final     Lyme Disease Antibodies Total 11/18/2024 0.13  <0.90 Final     Lipase 11/18/2024 44  13 - 60 U/L Final     Helicobacter pylori Antigen Stool 11/19/2024 Negative  Negative  Final     WBC Count 11/18/2024 6.6  4.0 - 11.0 10e3/uL Final     RBC Count 11/18/2024 4.49  3.80 - 5.20 10e6/uL Final     Hemoglobin 11/18/2024 14.3  11.7 - 15.7 g/dL Final     Hematocrit 11/18/2024 42.7  35.0 - 47.0 % Final     MCV 11/18/2024 95  78 - 100 fL Final     MCH 11/18/2024 31.8  26.5 - 33.0 pg Final     MCHC 11/18/2024 33.5  31.5 - 36.5 g/dL Final     RDW 11/18/2024 11.9  10.0 - 15.0 % Final     Platelet Count 11/18/2024 283  150 - 450 10e3/uL Final     % Neutrophils 11/18/2024 54  % Final     % Lymphocytes 11/18/2024 40  % Final     % Monocytes 11/18/2024 6  % Final     % Eosinophils 11/18/2024 0  % Final     % Basophils 11/18/2024 0  % Final     % Immature Granulocytes 11/18/2024 0  % Final     Absolute Neutrophils 11/18/2024 3.6  1.6 - 8.3 10e3/uL Final     Absolute Lymphocytes 11/18/2024 2.6  0.8 - 5.3 10e3/uL Final     Absolute Monocytes 11/18/2024 0.4  0.0 - 1.3 10e3/uL Final     Absolute Eosinophils 11/18/2024 0.0  0.0 - 0.7 10e3/uL Final     Absolute Basophils 11/18/2024 0.0  0.0 - 0.2 10e3/uL Final     Absolute Immature Granulocytes 11/18/2024 0.0  <=0.4 10e3/uL Final        Lab Results   Component Value Date    WBC 6.6 11/18/2024    WBC 8.7 03/27/2024    WBC 7.9 03/28/2016    HGB 14.3 11/18/2024    HGB 13.8 03/27/2024    HGB 13.0 03/28/2016     11/18/2024     03/27/2024     03/28/2016    CHOL 163 03/08/2023    TRIG 138 03/08/2023    HDL 43 (L) 03/08/2023    ALT 22 11/18/2024    ALT 13 05/12/2015    AST 24 11/18/2024    AST 14 05/12/2015     11/18/2024    BUN 12.0 11/18/2024    CO2 23 11/18/2024    TSH 2.72 11/18/2024    TSH 2.88 03/28/2016        Liver Function Studies -   Recent Labs   Lab Test 11/18/24  1705   PROTTOTAL 7.4   ALBUMIN 4.6   BILITOTAL 0.2   ALKPHOS 55   AST 24   ALT 22        PREVIOUS ENDOSCOPY    Results for orders placed or performed during the hospital encounter of 12/11/24   COLONOSCOPY    Collection Time: 12/11/24  9:57 AM   Result Value Ref Range     COLONOSCOPY       [facility]  Endoscopy Department-[place]  _______________________________________________________________________________  Patient Name: Stacey [place]           Procedure Date: 12/11/2024 9:57 AM  MRN: [id number]                       YOB: 1990  Admit Type: Outpatient                Age: 34  Gender: Female                        Note Status: Finalized  Attending MD: LINUS BACON MD,   Instrument Name: [id number]  _______________________________________________________________________________     Procedure:                Colonoscopy  Indications:              Clinically significant diarrhea of unexplained                             origin  Providers:                LINUS BACON MD  Referring MD:             ARABELLA SHEEHAN  Medicines:                Midazolam 2 mg IV, Fentanyl 100 micrograms IV  Complications:            No immediate complications.  _____________________________________________________ __________________________  Procedure:                Pre-Anesthesia Assessment:                            - Prior to the procedure, a History and Physical                             was performed, and patient medications and                             allergies were reviewed. The patient is competent.                             The risks and benefits of the procedure and the                             sedation options and risks were discussed with the                             patient. All questions were answered and informed                             consent was obtained. Patient identification and                             proposed procedure were verified by the physician                             in the pre-procedure area. Mental Status                             Examination: alert and oriented. Airway                             Examination: normal oropharyngeal airway and neck                              mobility. Respiratory Examination: clear to                              auscultation. CV Examination: normal. Prophylactic                             Antibiotics: The patient does not require                             prophylactic antibiotics. Prior Anticoagulants: The                             patient has taken no anticoagulant or antiplatelet                             agents. ASA Grade Assessment: I - A normal, healthy                             patient. After reviewing the risks and benefits,                             the patient was deemed in satisfactory condition to                             undergo the procedure. The anesthesia plan was to                             use moderate sedation / analgesia (conscious                             sedation). Immediately prior to administration of                             medications, the patient was re-assessed for                             adequacy to receive sedatives. The heart rate,                             respiratory rate, oxygen saturations, blood                              pressure, adequacy of pulmonary ventilation, and                             response to care were monitored throughout the                             procedure. The physical status of the patient was                             re-assessed after the procedure.                            After obtaining informed consent, the colonoscope                             was passed under direct vision. Throughout the                             procedure, the patient's blood pressure, pulse, and                             oxygen saturations were monitored continuously. The                             was introduced through the anus and advanced to the                             cecum, identified by appendiceal orifice and                             ileocecal valve. The colonoscopy was performed                             without difficulty. The patient  tolerated the                             procedure well. The quality of the bowel                             preparation was excell ent. The terminal ileum,                             ileocecal valve, appendiceal orifice, and rectum                             were photographed. Scope withdrawal time was 10                             minutes.                                                                                   Findings:       The perianal and digital rectal examinations were normal.       The colon (entire examined portion) appeared normal. Biopsies for        histology were taken with a cold forceps from the entire colon for        evaluation of microscopic colitis.       The terminal ileum appeared normal.       The entire examined colon appeared normal on direct and retroflexion        views.                                                                                   Moderate Sedation:       Moderate (conscious) sedation was administered by the nurse and        supervised by the endoscopist. The following parameters were monitored:        oxygen saturation, heart rate, blood pressure, and re sponse to care.        Total physician intraservice time was 17 minutes.  Impression:               - The entire examined colon is normal. Biopsied.                            - The examined portion of the ileum was normal.                            - The entire examined colon is normal on direct and                             retroflexion views.  Recommendation:           - Discharge patient to home.                            - Resume previous diet.                            - Await pathology results.                            - Repeat colonoscopy in 10 years for screening                             purposes.                            - Patient has a contact number available for                             emergencies. The signs and symptoms of potential                             delayed  complications were discussed with the                             patient. Return to normal activities tomorrow.                             Written discharge instructions were provided to th e                             patient.                                                                                     _______________________  LINUS BACON MD  12/11/2024 10:30:43 AM  I was physically present for the entire viewing portion of the exam.LINUS BACON MD  Number of Addenda: 0    Note Initiated On: 12/11/2024 9:57 AM  Scope In:  Scope Out:       Final Diagnosis   A.  COLON, RANDOM BIOPSIES:  -Colonic mucosa with no significant histologic abnormality  -Negative for active inflammation and lymphocytic colitis         Again, thank you for allowing me to participate in the care of your patient.        Sincerely,        Althea Springer PA-C

## 2024-12-19 NOTE — PATIENT INSTRUCTIONS
It was a pleasure taking care of you today.  I've included a brief summary of our discussion and care plan from today's visit below.  Please review this information with your primary care provider.  _______________________________________________________________________    My recommendations are summarized as follows:    HIDA scan to check function of gallbladder  Blood work and stool studies  MRI of small intestines  OK to start daily fiber supplement - citrucel.   Here are two forms of citrucel you can try (prefer powder over capsules, but capsules are OK too)     -Start soluble fiber supplementation with Citrucel powder - this should be taken daily. Start with 1/2 tablespoon mixed with large glass of water. Slowly increase dose by 1/2 tablespoon every 1-2 weeks until desired stool consistency is achieved (up to 2-3 tablespoons per day). For example start with 1/2 tablespoon, then after a week incease to 1 tablespoon, then 1.5 tablespoons, then 2 tablespoons, etc.    -Start taking Citrucel (methylcellulose) fiber caplets.  Start with 2 caplets in the evening and slowly increase as tolerated to effective dose (for example - increasing by 1-2 caps a week). Max dose is 2 caplets up to 6 times per day (12 caplets per day). You may experience bloating or discomfort, important to go slow and increase as tolerated, can take a few months to see full effect of fiber therapy.     Please call our clinic or send a MyChart message to us if you have any questions or concerns. MyChart messages are answered by your nurse or doctor typically within 24 hours.  Please allow extra time on weekends and holidays    Return to GI Clinic in 8-10 wk to review your progress.    _______________________________________________________________________    How do I schedule labs, imaging studies, or procedures that were ordered in clinic today?      Labs: To schedule lab appointment at [facility], use my chart or call  [phone]. If you have a [place] lab closer to home where you are regularly seen you can give them a call.      Procedures: If a colonoscopy, upper endoscopy, breath test, esophageal manometry, or pH impedence was ordered today, our endoscopy team will call you to schedule this. If you have not heard from our endoscopy team within a week, please call [phone] option 2 to schedule.      Imaging Studies: If you were scheduled for a CT scan, X-ray, MRI, ultrasound, HIDA scan, EKG or other imaging study, please call [phone] to have this scheduled.      Referral: If a referral to another specialty was ordered, expect a phone call or follow instructions above. If you have not heard from anyone regarding your referral in a week, please call our clinic to check the status.      Who do I call with any questions after my visit?  Please be in touch if there are any further questions that arise following today's visit.  There are multiple ways to contact your gastroenterology care team.       During business hours, you may reach a Gastroenterology nurse at [phone]     To schedule or reschedule an appointment, please call [phone].      You can always send a secure message through [employer].  [employer] messages are answered by your nurse or doctor typically within 24 hours.  Please allow extra time on weekends and holidays.       For urgent/emergent questions after business hours, you may reach the on-call GI Fellow by contacting the [facility]  at [phone].     How will I get the results of any tests ordered?    You will receive all of your results.  If you have signed up for [employer], any tests ordered at your visit will be available to you after your physician reviews them.  Typically this takes 1-2 weeks.  If there are urgent results that require a change in your care plan, your physician or nurse will call you to discuss the next steps.       What is [employer]?  [employer] is a secure  way for you to access all of your healthcare records from the [facility].  It is a web based computer program, so you can sign on to it from any location.  It also allows you to send secure messages to your care team.  I recommend signing up for [employer] access if you have not already done so and are comfortable with using a computer.       How to I schedule a follow-up visit?  If you did not schedule a follow-up visit today, please call [phone] to schedule a follow-up office visit.      Sincerely,    Althea Springer PA-C  Gastroenterology

## 2024-12-19 NOTE — NURSING NOTE
"Chief Complaint   Patient presents with    New Patient       Vitals:    12/19/24 1114   BP: 119/73   Pulse: 76   SpO2: 98%   Weight: 74.4 kg (164 lb 1.6 oz)   Height: 1.626 m (5' 4\")       Body mass index is 28.17 kg/m .    Sylvester Serna MA    "

## 2024-12-19 NOTE — PROGRESS NOTES
GI CLINIC VISIT    CC/REFERRING MD:  Dianne Ingram  REASON FOR CONSULTATION:   K52.9 (ICD-10-CM) - Chronic diarrhea   R79.82 (ICD-10-CM) - Elevated C-reactive protein   R53.83 (ICD-10-CM) - Fatigue, unspecified type     ASSESSMENT/PLAN:  Stacey Zavala is a 34 year old year old female with PMHx of listed lactose intolerance, HLA B27 positive, pelvic floor dysfunction (in PT) following with the [facility] GI group for diarrhea/changes in stooling x 5 mo. All her life she had trouble with constipation (upwards of no BM for 5d) but mid 2024, she shifted to a predominate diarrhea pattern that was partially responsive to a low fodmap diet. No bloody or black stools. Weight stable.     She had work up at her primary to include CRP to 7s, normal CBC, CMP, TSH. 12/2024 CTAP showed no bowel inflammation, obstruction or explanation for the change in BMs. It did show signs of chronic granulomatous disease so she will be undergoing evaluation with immunology.     Ultimately, a colonoscopy was done 12/2024 that was unremarkable with NEG random colon biopsies. Terminal ileum was normal.     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 Diarrhea suggestive of steatorrhea - possible malabsorptive process ongoing - eval with fecal fat and fecal elastase (CTAP W contrast with normal appearing pancreas). She had eliminated gluten prior to her TTG serologies so its possible this value could be a false negative. We agreed to repeat TTG Ab after a 14d gluten challenge. With HLA B27 POS history, IBD is on ddx. Recent high quality Cscope (TI evaluation) with negative random colon biopsies ruled out colonic disease but also considering other sites of inflammation; particularly SB - so will eval with MRE and fecal calpro today . Chronic infection also a possibility - recent enteric panel was NEG (including  giardia) and while not as likely, will r/o c.diff too . Lastly, she does have known pelvic floor dysfunction and is currently in PT biofeedback therapy. We can consider dedicated defecography studies in the future . DDX to also include carbohydrate malabsorption, SIBO/IMO vs other     -She wants to hold on EGD for now  -MRE ordered for eval SB for inflammation/disease given continued abd pain/diarrhea,NEG Cscope and HLA B27 history. Can consider CTE.   -c.diff  -fecal calpro   -fecal fat (72h) + fecal elastase ordered   -celiac serologies with total IgA after 14d gluten challenge.   -dietary consult to assist with reintroduction phase of low fodmap diet. Vegan   -start of daily fiber    Future consideration  1.EGD, if agreeable. If pursued, would recommend duodenal biopsies. Consider HLA genotyping for celiac permissive genes   2. Consider utility of VCE, though no HÉCTOR, ferritin was 31  3. Nutrient markers to be considered  4. Breath testing   5. Trials of holding gluten/dairy x 2 weeks each with diary  6. Empiric rifaximin vs SIBO testing   7. Bowel clean out   8. Imodium, BAS  9. PF eval with dedicated defecrography studies    #POS Martinez's Sign   RUQ US WNL. Hepatic function normal. Vitals stable and in NAD  -follow with HIDA; a remote relative with CC[facility]    Orders Placed This Encounter   Procedures    MR Enterography wo and w Contrast    NM Hepatobiliary Scan with GB EF and/or Pharm    Tissue transglutaminase rl IgA and IgG    IgA    Calprotectin Feces    Elastase Fecal    Fat Stool Qual Random Collection    Adult Nutrition  Referral    C. difficile Toxin B PCR with reflex to C. difficile Antigen and Toxins A/B EIA     Colorectal cancer screenin, unless otherwise clinically indicated     RTC - 8-10 wk or sooner PRN     Thank you for this consultation.  It was a pleasure to participate in the care of this patient; please contact me with any further questions.     Althea Springer PA-C    Follow up: As  "planned above. Today, I personally spent 45 minutes in direct face to face time with the patient, of which greater than 50% of the time was spent in patient education and counseling as described above. Approximately 30 minutes were spent on indirect care associated with the patient's consultation including but not limited to review of: patient medical records to date, clinic visits, hospital records, lab results, imaging studies, procedural documentation, and coordinating care with other providers. The findings from this review are summarized in the above note. All of the above accounted for a cumulative time of 75 minutes and was performed on the date of service.     MARK Zavala is a 34 year old year old female with PMHx of listed lactose intolerance, HLA B27 positive, pelvic floor dysfunction (in PT) following with the [facility] GI group for diarrhea/changes in stooling    10+ years ago, had a lot of trouble with constipation (requiring laxatives as a child; going upwards of no BM for 5+d). When she did stool, she passed a hard firm stool then a lot of diarrhea. Would spend up to 1 hour on the toilet for these stools - accompanied by have dry heaves, nausea. Then cycle repeats    She does not typically have that above bowel pattern any longer  In July 2024, she recognized a noticeable change in stooling pattern -  of having persistent diarrhea. She'd have a few days of frequent loose stools then days with better formed stools     In past 5 months, she'd Bms that look like \"piles of dirt\" and then followed by bouts of diarrhea.   -no bloody or black stools  -does see oily sheen to surface of stools  -sees occasional food particles  -notices more increased mucus in stools    Last 3 days, her stools have been normal appearing, good consistency     Some associated sx of gassiness/bloat  Appetite/weight stable  No nausea/vomiting    Went low fodmap for a bit, helped initially but not sure if its as effective now. " Vegan. Needs to reintroduce foods but process is overwhelming (in general)    Recent Cscope - high quality with TI evaluation. Normal macro exam. Random colon biopsies NEG for MC, no IBD.     No known FHX of IBD, celiac disease     Pelvic pain - sharp, severe episodes with onset 2 years. Linked with Bms and a subsequent change in stool consistency (looser)  She also has overall achy abd pain , generalized    In past 6 mo, having multiple days in a week of heartburn sx. Tums helps. 11/2024 H.pylori NEG. Never been on PPI.  -having some intermittent epigastric pain, unclear onset. Mild, 1-2 episodes a week, radiates down abdomen, midline    -No dysphagia, odynphagia, trouble w/ initiation of a swallow, melena, unintentional weight loss, nausea/vomiting    Undergoing evaluation for possible chronic granulomatous disease - CTAP with finding of multiple calcified splenic granulomas. Appt with immunology to be set.     Seen with rheum (7/2024) for chronic back pain and coccygeal pain (both thought mechanical). MR SI joint without sacroiliitis or enthesitis.     Seen with GYN (8/2024) for ongoing pelvic pain thought MSK vs PF dysfunction, possible endometriosis, IBS. Started on aygestin OCP and sent to PT    Seen with PT for pelvic pain/dysfunction and currently completing biofeedback therapy    Family Hx  Paternal Grandfather - CRC w/ colostomy bag, dx in his 60s   Paternal Aunt - IBS-D   Maternal Aunt - s/p CCY   Dad - diverticular disease, acid reflux   Brother - ankylosing spondylitis     No other known family history or GI related malignancy (esophageal, gastric, pancreatic, liver or colon) or family history of IBD/celiac disease.     Social Hx   Occassional use of NSAIDs or Tylenol now. Prolonged ibuprofen use in 2020    No ETOH  Never tobacco products   No recreational drug use     Vegan     PROBLEM LIST  Patient Active Problem List    Diagnosis Date Noted    Pelvic pain in female 09/17/2024     Priority: Medium     Sacral pain 05/03/2024     Priority: Medium    HLA B27 (HLA B27 positive) 04/05/2024     Priority: Medium    Family history of ankylosing spondylitis 03/08/2023     Priority: Medium    Migraine without aura and without status migrainosus, not intractable 03/08/2023     Priority: Medium    Septate uterus 04/14/2016     Priority: Medium    Papanicolaou smear of cervix with low grade squamous intraepithelial lesion (LGSIL) 04/01/2015     Priority: Medium     Hx of LSIL Pap  4/2015: colp - EGUENE I (outside records, recommended pap in 6 months)  10/22/15: pap - NIL . Plan pap in 6 months.   4/14/16: Pap - NIL. Plan Pap in 1 year per provider.  06/04/18 Patient is lost to pap tracking follow-up.  06/26/19: NIL Pap. Plan cotest in 3 years.    3/8/23 NIL Pap, Neg HR HPV. Plan: Routine screening.          PERTINENT PAST MEDICAL HISTORY:  Past Medical History:   Diagnosis Date    Papanicolaou smear of cervix with low grade squamous intraepithelial lesion (LGSIL) 04/2015    colp - EUGENE I       PREVIOUS SURGERIES:  Past Surgical History:   Procedure Laterality Date    BIOPSY  April 2015    Cervix    EYE SURGERY  2013    Minor removal of corneal deposit, twice, in right eye       ALLERGIES:     Allergies   Allergen Reactions    Lactose        PERTINENT MEDICATIONS:    Current Outpatient Medications:     bisacodyl (DULCOLAX) 5 MG EC tablet, Take 2 tablets at 3 pm the day before your procedure. If your procedure is before 11 am, take 2 additional tablets at 11 pm. If your procedure is after 11 am, take 2 additional tablets at 6 am. For additional instructions refer to your colonoscopy prep instructions., Disp: 4 tablet, Rfl: 0    cholecalciferol (VITAMIN D3) 25 mcg (1000 units) capsule, Take 1 capsule by mouth daily, Disp: , Rfl:     Cyanocobalamin (B-12) 1000 MCG CAPS, Take 1,000 mcg by mouth daily, Disp: , Rfl:     multivitamin w/minerals (MULTI-VITAMIN) tablet, Take 1 tablet by mouth daily, Disp: , Rfl:     norethindrone  (AYGESTIN) 5 MG tablet, Take 1 tablet (5 mg) by mouth daily, Disp: 90 tablet, Rfl: 1    norethindrone-ethinyl estradiol-iron (JUNEL FE 1.5/30) 1.5-30 MG-MCG tablet, TAKE 1 TABLET BY MOUTH DAILY. SKIP PLACEBO EVERY OTHER MONTH, Disp: 84 tablet, Rfl: 3    omega 3 1000 MG CAPS, , Disp: , Rfl:     polyethylene glycol (GOLYTELY) 236 g suspension, The night before the exam at 6 pm drink an 8-ounce glass every 15 minutes until the jug is half empty. If you arrive before 11 AM: Drink the other half of the Golytely jug at 11 PM night before procedure. If you arrive after 11 AM: Drink the other half of the Golytely jug at 6 AM day of procedure. For additional instructions refer to your colonoscopy prep instructions., Disp: 4000 mL, Rfl: 0    SOCIAL HISTORY:  Social History     Socioeconomic History    Marital status: Single     Spouse name: Not on file    Number of children: Not on file    Years of education: Not on file    Highest education level: Not on file   Occupational History    Not on file   Tobacco Use    Smoking status: Never    Smokeless tobacco: Never   Vaping Use    Vaping status: Never Used   Substance and Sexual Activity    Alcohol use: Not Currently     Comment: Minimal    Drug use: Never    Sexual activity: Not Currently     Partners: Male     Birth control/protection: Abstinence, Pill     Comment: I would like a new birth control prescription   Other Topics Concern    Parent/sibling w/ CABG, MI or angioplasty before 65F 55M? No   Social History Narrative    Not on file     Social Drivers of Health     Financial Resource Strain: Low Risk  (3/23/2024)    Financial Resource Strain     Within the past 12 months, have you or your family members you live with been unable to get utilities (heat, electricity) when it was really needed?: No   Food Insecurity: Low Risk  (3/23/2024)    Food Insecurity     Within the past 12 months, did you worry that your food would run out before you got money to buy more?: No      Within the past 12 months, did the food you bought just not last and you didn t have money to get more?: No   Transportation Needs: Low Risk  (3/23/2024)    Transportation Needs     Within the past 12 months, has lack of transportation kept you from medical appointments, getting your medicines, non-medical meetings or appointments, work, or from getting things that you need?: No   Physical Activity: Sufficiently Active (3/23/2024)    Exercise Vital Sign     Days of Exercise per Week: 6 days     Minutes of Exercise per Session: 60 min   Stress: Stress Concern Present (3/23/2024)    [employer] [place] [employer] - Occupational Stress Questionnaire     Feeling of Stress : To some extent   Social Connections: Unknown (3/23/2024)    Social Connection and Isolation Panel [NHANES]     Frequency of Communication with Friends and Family: Not on file     Frequency of Social Gatherings with Friends and Family: Once a week     Attends Christianity Services: Not on file     Active Member of Clubs or Organizations: Not on file     Attends Club or Organization Meetings: Not on file     Marital Status: Not on file   Interpersonal Safety: Unknown (12/11/2024)    Interpersonal Safety     Do you feel physically and emotionally safe where you currently live?: Patient unable to answer     Within the past 12 months, have you been hit, slapped, kicked or otherwise physically hurt by someone?: Patient unable to answer     Within the past 12 months, have you been humiliated or emotionally abused in other ways by your partner or ex-partner?: Patient unable to answer   Housing Stability: Low Risk  (3/23/2024)    Housing Stability     Do you have housing? : Yes     Are you worried about losing your housing?: No       FAMILY HISTORY:  Family History   Problem Relation Age of Onset    Diabetes Father     Hyperlipidemia Father     GERD Father         Heartburn    Obesity Father     Depression Maternal Grandmother     Colon Cancer Paternal  Grandfather     Skin Cancer Paternal Aunt     Depression Brother     Anxiety Disorder Brother     Genetic Disorder Brother         Tested positive for HLA-B27. Doctors suspect  Ankylosing Spondylitis, or something similar       Past/family/social history reviewed and no changes    PHYSICAL EXAMINATION:  Vitals reviewed: LMP 08/01/2024   Constitutional: aaox3, cooperative, pleasant, not dyspneic/diaphoretic, no acute distress  Eyes: Sclera anicteric/injected  Ears/nose/mouth/throat: hearing intact  Neck: supple, active ROM w/o limitation or pain   CV: No edema  Respiratory: Unlabored breathing  Abd: Nondistended, mild tender to lower abdomen,  no peritoneal signs, POS Martinez's sign  Skin: warm, perfused, no jaundice  Psych: Normal affect  MSK: Normal gait     PERTINENT STUDIES:    Office Visit on 11/18/2024   Component Date Value Ref Range Status    Campylobacter species 11/19/2024 Negative  Negative Final    Salmonella species 11/19/2024 Negative  Negative Final    Vibrio species 11/19/2024 Negative  Negative Final    Vibrio cholerae 11/19/2024 Negative  Negative Final    Yersinia enterocolitica 11/19/2024 Negative  Negative Final    Enteropathogenic E. coli (EPEC) 11/19/2024 Negative  Negative, NA Final    Shiga-like toxin-producing E. coli* 11/19/2024 Negative  Negative Final    Shigella/Enteroinvasive E. coli (E* 11/19/2024 Negative  Negative Final    Cryptosporidium species 11/19/2024 Negative  Negative Final    Giardia lamblia 11/19/2024 Negative  Negative Final    Norovirus Gl/Gll 11/19/2024 Positive (A)  Negative Final    Rotavirus A 11/19/2024 Negative  Negative Final    Plesiomonas shigelloides 11/19/2024 Negative  Negative Final    Enteroaggregative E. coli (EAEC) 11/19/2024 Negative  Negative Final    Enterotoxigenic E. coli (ETEC) 11/19/2024 Negative  Negative Final    E. coli O157 11/19/2024 NA  Negative, NA Final    Cyclospora cayetanensis 11/19/2024 Negative  Negative Final    Entamoeba histolytica  11/19/2024 Negative  Negative Final    Adenovirus F40/41 11/19/2024 Negative  Negative Final    Astrovirus 11/19/2024 Negative  Negative Final    Sapovirus 11/19/2024 Negative  Negative Final    CRP Inflammation 11/18/2024 3.26  <5.00 mg/L Final    Tissue Transglutaminase Antibody I* 11/18/2024 0.6  <7.0 U/mL Final    Tissue Transglutaminase Antibody I* 11/18/2024 <0.6  <7.0 U/mL Final    TSH 11/18/2024 2.72  0.30 - 4.20 uIU/mL Final    Ferritin 11/18/2024 31  6 - 175 ng/mL Final    Vitamin D, Total (25-Hydroxy) 11/18/2024 19 (L)  20 - 50 ng/mL Final    Sodium 11/18/2024 142  135 - 145 mmol/L Final    Potassium 11/18/2024 4.4  3.4 - 5.3 mmol/L Final    Carbon Dioxide (CO2) 11/18/2024 23  22 - 29 mmol/L Final    Anion Gap 11/18/2024 12  7 - 15 mmol/L Final    Urea Nitrogen 11/18/2024 12.0  6.0 - 20.0 mg/dL Final    Creatinine 11/18/2024 0.80  0.51 - 0.95 mg/dL Final    GFR Estimate 11/18/2024 >90  >60 mL/min/1.73m2 Final    Calcium 11/18/2024 9.4  8.8 - 10.4 mg/dL Final    Chloride 11/18/2024 107  98 - 107 mmol/L Final    Glucose 11/18/2024 90  70 - 99 mg/dL Final    Alkaline Phosphatase 11/18/2024 55  40 - 150 U/L Final    AST 11/18/2024 24  0 - 45 U/L Final    ALT 11/18/2024 22  0 - 50 U/L Final    Protein Total 11/18/2024 7.4  6.4 - 8.3 g/dL Final    Albumin 11/18/2024 4.6  3.5 - 5.2 g/dL Final    Bilirubin Total 11/18/2024 0.2  <=1.2 mg/dL Final    Lyme Disease Antibodies Total 11/18/2024 0.13  <0.90 Final    Lipase 11/18/2024 44  13 - 60 U/L Final    Helicobacter pylori Antigen Stool 11/19/2024 Negative  Negative Final    WBC Count 11/18/2024 6.6  4.0 - 11.0 10e3/uL Final    RBC Count 11/18/2024 4.49  3.80 - 5.20 10e6/uL Final    Hemoglobin 11/18/2024 14.3  11.7 - 15.7 g/dL Final    Hematocrit 11/18/2024 42.7  35.0 - 47.0 % Final    MCV 11/18/2024 95  78 - 100 fL Final    MCH 11/18/2024 31.8  26.5 - 33.0 pg Final    MCHC 11/18/2024 33.5  31.5 - 36.5 g/dL Final    RDW 11/18/2024 11.9  10.0 - 15.0 % Final     Platelet Count 11/18/2024 283  150 - 450 10e3/uL Final    % Neutrophils 11/18/2024 54  % Final    % Lymphocytes 11/18/2024 40  % Final    % Monocytes 11/18/2024 6  % Final    % Eosinophils 11/18/2024 0  % Final    % Basophils 11/18/2024 0  % Final    % Immature Granulocytes 11/18/2024 0  % Final    Absolute Neutrophils 11/18/2024 3.6  1.6 - 8.3 10e3/uL Final    Absolute Lymphocytes 11/18/2024 2.6  0.8 - 5.3 10e3/uL Final    Absolute Monocytes 11/18/2024 0.4  0.0 - 1.3 10e3/uL Final    Absolute Eosinophils 11/18/2024 0.0  0.0 - 0.7 10e3/uL Final    Absolute Basophils 11/18/2024 0.0  0.0 - 0.2 10e3/uL Final    Absolute Immature Granulocytes 11/18/2024 0.0  <=0.4 10e3/uL Final        Lab Results   Component Value Date    WBC 6.6 11/18/2024    WBC 8.7 03/27/2024    WBC 7.9 03/28/2016    HGB 14.3 11/18/2024    HGB 13.8 03/27/2024    HGB 13.0 03/28/2016     11/18/2024     03/27/2024     03/28/2016    CHOL 163 03/08/2023    TRIG 138 03/08/2023    HDL 43 (L) 03/08/2023    ALT 22 11/18/2024    ALT 13 05/12/2015    AST 24 11/18/2024    AST 14 05/12/2015     11/18/2024    BUN 12.0 11/18/2024    CO2 23 11/18/2024    TSH 2.72 11/18/2024    TSH 2.88 03/28/2016        Liver Function Studies -   Recent Labs   Lab Test 11/18/24  1705   PROTTOTAL 7.4   ALBUMIN 4.6   BILITOTAL 0.2   ALKPHOS 55   AST 24   ALT 22        PREVIOUS ENDOSCOPY    Results for orders placed or performed during the hospital encounter of 12/11/24   COLONOSCOPY    Collection Time: 12/11/24  9:57 AM   Result Value Ref Range    COLONOSCOPY       [facility]  Endoscopy Department-[place]  _______________________________________________________________________________  Patient Name: Stacey Kwokvis           Procedure Date: 12/11/2024 9:57 AM  MRN: [id number]                       YOB: 1990  Admit Type: Outpatient                Age: 34  Gender: Female                        Note Status:  Finalized  Attending MD: LINUS BACON MD,   Instrument Name: [id number]  _______________________________________________________________________________     Procedure:                Colonoscopy  Indications:              Clinically significant diarrhea of unexplained                             origin  Providers:                LINUS BACON MD  Referring MD:             ARABELLA SHEEHAN  Medicines:                Midazolam 2 mg IV, Fentanyl 100 micrograms IV  Complications:            No immediate complications.  _____________________________________________________ __________________________  Procedure:                Pre-Anesthesia Assessment:                            - Prior to the procedure, a History and Physical                             was performed, and patient medications and                             allergies were reviewed. The patient is competent.                             The risks and benefits of the procedure and the                             sedation options and risks were discussed with the                             patient. All questions were answered and informed                             consent was obtained. Patient identification and                             proposed procedure were verified by the physician                             in the pre-procedure area. Mental Status                             Examination: alert and oriented. Airway                             Examination: normal oropharyngeal airway and neck                             mobility. Respiratory Examination: clear to                              auscultation. CV Examination: normal. Prophylactic                             Antibiotics: The patient does not require                             prophylactic antibiotics. Prior Anticoagulants: The                             patient has taken no anticoagulant or antiplatelet                             agents. ASA Grade  Assessment: I - A normal, healthy                             patient. After reviewing the risks and benefits,                             the patient was deemed in satisfactory condition to                             undergo the procedure. The anesthesia plan was to                             use moderate sedation / analgesia (conscious                             sedation). Immediately prior to administration of                             medications, the patient was re-assessed for                             adequacy to receive sedatives. The heart rate,                             respiratory rate, oxygen saturations, blood                              pressure, adequacy of pulmonary ventilation, and                             response to care were monitored throughout the                             procedure. The physical status of the patient was                             re-assessed after the procedure.                            After obtaining informed consent, the colonoscope                             was passed under direct vision. Throughout the                             procedure, the patient's blood pressure, pulse, and                             oxygen saturations were monitored continuously. The                             was introduced through the anus and advanced to the                             cecum, identified by appendiceal orifice and                             ileocecal valve. The colonoscopy was performed                             without difficulty. The patient tolerated the                             procedure well. The quality of the bowel                             preparation was excell ent. The terminal ileum,                             ileocecal valve, appendiceal orifice, and rectum                             were photographed. Scope withdrawal time was 10                             minutes.                                                                                    Findings:       The perianal and digital rectal examinations were normal.       The colon (entire examined portion) appeared normal. Biopsies for        histology were taken with a cold forceps from the entire colon for        evaluation of microscopic colitis.       The terminal ileum appeared normal.       The entire examined colon appeared normal on direct and retroflexion        views.                                                                                   Moderate Sedation:       Moderate (conscious) sedation was administered by the nurse and        supervised by the endoscopist. The following parameters were monitored:        oxygen saturation, heart rate, blood pressure, and re sponse to care.        Total physician intraservice time was 17 minutes.  Impression:               - The entire examined colon is normal. Biopsied.                            - The examined portion of the ileum was normal.                            - The entire examined colon is normal on direct and                             retroflexion views.  Recommendation:           - Discharge patient to home.                            - Resume previous diet.                            - Await pathology results.                            - Repeat colonoscopy in 10 years for screening                             purposes.                            - Patient has a contact number available for                             emergencies. The signs and symptoms of potential                             delayed complications were discussed with the                             patient. Return to normal activities tomorrow.                             Written discharge instructions were provided to  e                             patient.                                                                                     _______________________  LINUS BACON MD  12/11/2024 10:30:43 AM  I was physically present for the  entire viewing portion of the exam.LINUS BACON MD  Number of Addenda: 0    Note Initiated On: 12/11/2024 9:57 AM  Scope In:  Scope Out:       Final Diagnosis   A.  COLON, RANDOM BIOPSIES:  -Colonic mucosa with no significant histologic abnormality  -Negative for active inflammation and lymphocytic colitis

## 2024-12-27 ENCOUNTER — MYC MEDICAL ADVICE (OUTPATIENT)
Dept: FAMILY MEDICINE | Facility: CLINIC | Age: 34
End: 2024-12-27
Payer: COMMERCIAL

## 2024-12-27 DIAGNOSIS — D71 CHRONIC GRANULOMATOUS DISEASE (H): Primary | ICD-10-CM

## 2025-01-14 ENCOUNTER — MYC MEDICAL ADVICE (OUTPATIENT)
Dept: FAMILY MEDICINE | Facility: CLINIC | Age: 35
End: 2025-01-14
Payer: COMMERCIAL

## 2025-01-14 DIAGNOSIS — D71 CHRONIC GRANULOMATOUS DISEASE (H): Primary | ICD-10-CM

## 2025-01-14 NOTE — TELEPHONE ENCOUNTER
Routing to PCP.  Please see request below from patient.  Please advise.    FREIAD Radford  [facility]

## 2025-01-21 ENCOUNTER — TELEPHONE (OUTPATIENT)
Dept: ALLERGY | Facility: CLINIC | Age: 35
End: 2025-01-21
Payer: COMMERCIAL

## 2025-01-21 NOTE — TELEPHONE ENCOUNTER
"[facility] Call Center    Phone Message    May a detailed message be left on voicemail: no     Reason for Call: Appointment Intake    Referring Provider Name: Dianne Ingram DO in [facility]  Diagnosis and/or Symptoms: Chronic granulomatous disease (H) [D71]  Additional Information: \"dr posada or dr obed henderson. not a formal diagnosis yet, hoping to see a specialist to evaluate for this - concerns for CGD on imaging. P\"       Protocols state clinic does not see for immunology and diagnosis is not in protocols. Routing to clinics to review since specific providers are being requesting and reach out to patient or contact referring provider informing of clinic's decision.     Action Taken: Message routed to:  Other: [facility] Posada Care Team Pool    Travel Screening: Not Applicable     Date of Service:                        No need to reply to sender of message                                              "

## 2025-01-21 NOTE — TELEPHONE ENCOUNTER
Called and left patient a voicemail to discuss referral. After reviewing with Dr. Xiao he does not see for this diagnosis. He would recommend the patient see [facility], which from chart review it looks like the patient may already be scheduled with them. He did mention that patient could possibly see Rheumatology for possible Chronic granulomatous disease diagnosis. Patient has already seen Dr. Henry for other concerns and could possible schedule a return visit with this provider. Will discuss this with patient when she calls the clinic back.     GINNY BridgesN, RN

## 2025-01-21 NOTE — TELEPHONE ENCOUNTER
Stacey Farrell's phone call. Inform patient, nurse will call patient back tomorrow. Please advise.

## 2025-01-22 NOTE — TELEPHONE ENCOUNTER
Patient has the run around a it tryig to receuve help to figure things out. Patient would like a call back just to talk things throogh. Please call cell ending [phone].Thank you

## 2025-01-23 ENCOUNTER — VIRTUAL VISIT (OUTPATIENT)
Dept: GASTROENTEROLOGY | Facility: CLINIC | Age: 35
End: 2025-01-23
Payer: COMMERCIAL

## 2025-01-23 DIAGNOSIS — K52.9 CHRONIC DIARRHEA: Primary | ICD-10-CM

## 2025-01-23 NOTE — PATIENT INSTRUCTIONS
It was nice meeting you today. Below are the nutrition recommendations we discussed at your visit.    Please let me know if you have any additional questions.    Nutrition Recommendations    1. Aim for eating smaller meals such as 4-6 smaller meals per day versus fewer larger meals as this may be better tolerated.    --eat meals and snacks on a consistent eating pattern too.    2. Have a planned afternoon snack (between lunch and dinner meal) as this may help prevent overeating at dinner meal.    3. Eat a higher fiber diet. General goal is to consume about 25-30 grams of fiber per day.     --When increasing fiber in diet, do so gradually and do not eat too much fiber all of a sudden and also try not to load up on a lot of fiber all at one meal.    --Especially include/continue to include some good sources of soluble fiber in your diet such as oats,  barley, sweet potato, potato, brussels sprouts, broccoli, evelia seeds, flaxseeds, avocado, legumes/beans, figs, apples, banana, pear.    --continue having your usual breakfast with oatmeal, ground flax for example.    4. Continue taking Citrucel daily.    5. Discontinue drinking sparkling water/carbonated beverages as these can contribute to bloating, gas, stomach pain, belching and reflux.    --at lunch and dinner you can switch to having non carbonated water or could have some herbal tea (cold or hot whichever you prefer).    6. Continue to drink at least 48 oz-64 oz water or more per day.    7. At dinner, avoid distractions while eating such as watching TV or using a phone or computer. Sit at the table and stop eating before you feel too full. (This can prevent overeating at dinner and may decrease issues with reflux in the evening and overnight).    8. Wait at least 2-3 hours after eating before lying down and going to sleep. (This can help prevent issues with reflux overnight).    --If you know ahead of time that you will work later and will workout that night before  eating, then you could prepare you meal ahead of time and then can reheat a serving for dinner, so this may allow you to eat sooner in the evening.    8. Since you have been feeling better, you don't need to follow a low FODMAP diet at this time. If you have a return of symptoms/diarrhea, then you could re-try the low FODMAP diet, if you'd like to.  We could follow up at that time but also Below is the process I recommend for the low FODMAP diet.    If you decide to retry a full low FODMAP diet, then here are the directions:    - Discontinue eating higher FODMAP foods for at least 3-4 weeks.     -If you have some relief of symptoms/bloating, after the 3-4 weeks, then you will start adding those higher FODMAP foods back into diet. (see process for reintroducing below).    OR   If you prefer to do a partial lower FODMAP diet, then    -For Modified/partial lower FODMAP diet, review the list of high FODMAP foods and then eliminate several of those higher FODMAP foods that you eat most days of the week for 3-4 weeks.    If you have some relief of symptoms after the elimination phase of the low FODMAP diet/ after at least 3-4 weeks, then you will start reintroducing higher FODMAP foods back into your diet:    -Start by adding back in 1 higher fodmap food at a time by eating a small serving of that food each day for 3 days in a row OR you can gradually increase the portion size over the course of the 3 days. For example on day 1, can have 1/4 cup serving, then on day 2, can have 1/3 c and then on day 3, can have a 1/2 cup serving.    -If tolerated, then wait at least one day, and then add another higher FODMAP food back into diet for 3 days in a row and onward.     -If you prefer to reintroduce foods slowly, then you could add one food each week (depending on how quickly you'd like to re-introduce and also depending on if you have a return of symptoms that may linger for a day or so).    -If you notice any symptoms after  adding back in a higher FODMAP food than can wait until symptoms improve before trying the next higher FODMAP food.     -Also if you notice a significant increase in symptoms after re-trying the first day, you do not need to eat on additional days.    -When reintroducing higher FODMAP foods, track what food you retried, the amount you tried each day and any symptoms you experience.    --Plan menus and meals ahead of time to help you stick to the elimination diet.    Here are some websites with low FODMAP recipes:    Www.fodmapeverMassive.SeMeAntoja.com  Www.MDdatacor.SeMeAntoja.com  IBSfree.net    [employer] at www.[employer] sells ready made low FODMAP meals.     Can follow up as needed. Depending on the tests you are having done ordered by Althea Springer PA-C, there may be some additional diet recommendation, so we can follow up at that time.    If you would like to schedule a follow up appointment, please call [phone].    Thank you,    Sheron Rodrigez, MS, RD, LD

## 2025-01-23 NOTE — PROGRESS NOTES
"Virtual Visit Details    Type of service:  Video Visit     Originating Location (pt. Location): Home  Distant Location (provider location):  Off-site  Platform used for Video Visit: [facility] Medical Nutrition Therapy      Time Spent:  60 minutes  Session Type:  Initial   Referring Physician:  Althea Springer PA-C  Reason for RD Visit:   chronic diarrhea     Nutrition Assessment:  Patient is a 34 year old female with history that includes migraines, HLA B27 positive, family history of ankylosing spondylitis, pelvic floor dysfunction, lactose intolerance, chronic diarrhea, epigastric pain, abdominal pain, increased CRP. Follows a Vegan diet.    She stated that she has 1 BM per day. She was having watery diarrhea for that one BM each day for about a 5 month period, but now ever since her colonoscopy, she has been have 1 long bowel movement per day and no longer having diarrhea. Her BM was a little different and wasn't as long but was formed. She stated that she used to have constipation all of the time and then would only have 1 really bad bout of diarrhea during her menstrual cycle and maybe a few \"not great\" BMs around the time of her period monthly. In 2015, she was having an issues with a lot of \"horrible diarrhea\" to the point she felt like she could pass out. At that time her doctor told her to try a low FODMAP diet. She tried it at that time, but since she follows a vegan diet, it was difficult for her to follow and she wasn't sure that it was helpful. Additionally she had a trip out of state, so she ate just a little bit of granola the night before her trip and had diarrhea the next day and then had diarrhea her entire trip at that time in 2015. She used to eat a lot of apples, so on another occasion, when she was eating a low FODMAP diet, she decided to try hot apple cider b/c she didn't think the low FODMAP changed her symptoms, but the next day she had diarrhea so wonders if " "possibly she may have some sensitivity to fodmaps. Due to following a vegan diet, she found it difficult to follow a low fodmap diet because she eats beans and chickpeas daily as well as several other high fodmap foods. She had a normal colonoscopy and terminal ileum were normal at her recent Cscopy in 12/2024. She has some additional labs and testing this week as well. Overall though since her cscopy in 12/2024, last month, her BMs are back to normal and she is feeling well. She does have a lot of belching and gets reflux. She notices the reflux when she is using her dumbbells at home and sometimes when she lies down to do to bed. She tends to eat dinner later and lies down about 1-2 hours after dinner. She doesn't typically snack so eats 3 meals per day. Dinner is typically her larger meal of the day. She drinks sparkling water with lunch and dinner meals, drinks ~50-60 oz of water, lg cup coffee in AM with soy creamer, and has 0-3 cups of tea (could be black, green or herbal tea) per day. She doesn't drink any alcohol. Even just sips give her migraines and make her nauseous so avoids altogether.     Based on conversation today, since her BMs are normal now, she would like to try some ideas discussed today (see goals below) and not retry a low FODMAP diet at this time. If she has a return of symptoms, then she may want to retry at a future time.     Patient Active Problem List   Diagnosis    Papanicolaou smear of cervix with low grade squamous intraepithelial lesion (LGSIL)    Septate uterus    Family history of ankylosing spondylitis    Migraine without aura and without status migrainosus, not intractable    HLA B27 (HLA B27 positive)    Sacral pain    Pelvic pain in female     Height:   Ht Readings from Last 1 Encounters:   12/19/24 1.626 m (5' 4\")   ]  Weight:  Wt Readings from Last 10 Encounters:   12/19/24 74.4 kg (164 lb 1.6 oz)   11/18/24 73.9 kg (163 lb)   08/01/24 69.9 kg (154 lb)   07/09/24 69.9 kg (154 " "lb)   03/27/24 69.9 kg (154 lb)   03/31/23 72.6 kg (160 lb)   03/08/23 71.2 kg (157 lb)   06/26/19 67.1 kg (148 lb)   04/30/18 65.3 kg (144 lb)   04/14/17 64.4 kg (142 lb)      BMI: Estimated body mass index is 28.17 kg/m  as calculated from the following:    Height as of 12/19/24: 1.626 m (5' 4\").    Weight as of 12/19/24: 74.4 kg (164 lb 1.6 oz).    Diet Recall:  (some usual/recent meals/snacks/beverages): she follows a vegan diet. She eats 3 meals per day  Meal Food    Breakfast Oatmeal with PB, blueberries, 1T grd flax and 1T hemp hearts   Lunch Now leftovers or previously her usual For a long time salad: kale with chick peas, homemade vegan cashew dressing   Dinner King burrito or tofu stir singer or bean chili or portabello mushroom cap   Snacks Sometimes craves pc chocolate in evening or occas bowl cereal (PB kashi crunch) or carrots or hummus   Beverages Lg cup Coffee with soy creamer, 50-60 oz Water, with lunch and dinner, sparkling water, in AM with Bkft, sm amt OJ (4-6 oz OJ mixed with sparkling water) and 0-3 cup hot tea (green, blk, herbal)   Alcohol Intake None (doesn't tolerate it, it gives her a headache and nauseous)     Frequency of eating/taking out meals: 1-2x/week on average.     Labs:    Last Comprehensive Metabolic Panel:  Sodium   Date Value Ref Range Status   11/18/2024 142 135 - 145 mmol/L Final     Potassium   Date Value Ref Range Status   11/18/2024 4.4 3.4 - 5.3 mmol/L Final   05/12/2015 3.9 mmol/L Final     Chloride   Date Value Ref Range Status   11/18/2024 107 98 - 107 mmol/L Final     Carbon Dioxide (CO2)   Date Value Ref Range Status   11/18/2024 23 22 - 29 mmol/L Final     Anion Gap   Date Value Ref Range Status   11/18/2024 12 7 - 15 mmol/L Final     Glucose   Date Value Ref Range Status   11/18/2024 90 70 - 99 mg/dL Final   05/12/2015 88 70 - 99 mg/dL Final     Urea Nitrogen   Date Value Ref Range Status   11/18/2024 12.0 6.0 - 20.0 mg/dL Final     Creatinine   Date Value Ref Range " Status   11/18/2024 0.80 0.51 - 0.95 mg/dL Final   05/12/2015 0.82 mg/dL Final     GFR Estimate   Date Value Ref Range Status   11/18/2024 >90 >60 mL/min/1.73m2 Final     Comment:     eGFR calculated using 2021 CKD-EPI equation.   05/12/2015 101 ml/min/1.73m2 Final     Calcium   Date Value Ref Range Status   11/18/2024 9.4 8.8 - 10.4 mg/dL Final     Comment:     Reference intervals for this test were updated on 7/16/2024 to reflect our healthy population more accurately. There may be differences in the flagging of prior results with similar values performed with this method. Those prior results can be interpreted in the context of the updated reference intervals.     CBC RESULTS:   Recent Labs   Lab Test 11/18/24  1705   WBC 6.6   RBC 4.49   HGB 14.3   HCT 42.7   MCV 95   MCH 31.8   MCHC 33.5   RDW 11.9          Pertinent Medications/vitamin and mineral supplements:      Current Outpatient Medications   Medication Sig Dispense Refill    bisacodyl (DULCOLAX) 5 MG EC tablet Take 2 tablets at 3 pm the day before your procedure. If your procedure is before 11 am, take 2 additional tablets at 11 pm. If your procedure is after 11 am, take 2 additional tablets at 6 am. For additional instructions refer to your colonoscopy prep instructions. 4 tablet 0    cholecalciferol (VITAMIN D3) 25 mcg (1000 units) capsule Take 1 capsule by mouth daily      Cyanocobalamin (B-12) 1000 MCG CAPS Take 1,000 mcg by mouth daily      multivitamin w/minerals (MULTI-VITAMIN) tablet Take 1 tablet by mouth daily      norethindrone (AYGESTIN) 5 MG tablet Take 1 tablet (5 mg) by mouth daily 90 tablet 1    norethindrone-ethinyl estradiol-iron (JUNEL FE 1.5/30) 1.5-30 MG-MCG tablet TAKE 1 TABLET BY MOUTH DAILY. SKIP PLACEBO EVERY OTHER MONTH 84 tablet 3    omega 3 1000 MG CAPS       polyethylene glycol (GOLYTELY) 236 g suspension The night before the exam at 6 pm drink an 8-ounce glass every 15 minutes until the jug is half empty. If you  arrive before 11 AM: Drink the other half of the GolShoobsly jug at 11 PM night before procedure. If you arrive after 11 AM: Drink the other half of the Golytely jug at 6 AM day of procedure. For additional instructions refer to your colonoscopy prep instructions. 4000 mL 0     No current facility-administered medications for this visit.     Food Allergies:  lactose    Physical Activity:  5x/week, walks for 30 mins on lunch break and 30 min dumbbells, 3-5 times per week, sometimes recently started adding some walking/running after doing her dumbbells 3-5 times per week. On a weekend day, if not too cold, goes for a 5 mile hike.    MALNUTRITION:  % Weight Loss:  None noted  % Intake:  No decreased intake noted  Subcutaneous Fat Loss:  None observed  Muscle Loss:  None observed  Fluid Retention:  None noted    Malnutrition Diagnosis: Patient does not meet two of the above criteria necessary for diagnosing malnutrition    Nutrition Prescription: Nutrition Education     Nutrition Intervention      Provided diet education for chronic diarrhea, belching, epigastric pain, hx constipation.    Answered patient's questions. Patient verbalized understanding of education provided. See Goals below.     Goals:    1. Aim for eating smaller meals such as 4-6 smaller meals per day versus fewer larger meals as this may be better tolerated.    --eat meals and snacks on a consistent eating pattern too.    2. Have a planned afternoon snack (between lunch and dinner meal) as this may help prevent overeating at dinner meal.    3. Eat a higher fiber diet. General goal is to consume about 25-30 grams of fiber per day.     --When increasing fiber in diet, do so gradually and do not eat too much fiber all of a sudden and also try not to load up on a lot of fiber all at one meal.    --Especially include/continue to include some good sources of soluble fiber in your diet such as oats,  barley, sweet potato, potato, brussels sprouts, broccoli, evelia  seeds, flaxseeds, avocado, legumes/beans, figs, apples, banana, pear.    --continue having your usual breakfast with oatmeal, ground flax for example.    4. Continue taking Citrucel daily.    5. Discontinue drinking sparkling water/carbonated beverages as these can contribute to bloating, gas, stomach pain, belching and reflux.    --at lunch and dinner you can switch to having non carbonated water or could have some herbal tea (cold or hot whichever you prefer).    6. Continue to drink at least 48 oz-64 oz water or more per day.    7. At dinner, avoid distractions while eating such as watching TV or using a phone or computer. Sit at the table and stop eating before you feel too full. (This can prevent overeating at dinner and may decrease issues with reflux in the evening and overnight).    8. Wait at least 2-3 hours after eating before lying down and going to sleep. (This can help prevent issues with reflux overnight).    --If you know ahead of time that you will work later and will workout that night before eating, then you could prepare you meal ahead of time and then can reheat a serving for dinner, so this may allow you to eat sooner in the evening.    8. Since you have been feeling better, you don't need to follow a low FODMAP diet at this time. If you have a return of symptoms/diarrhea, then you could re-try the low FODMAP diet, if you'd like to.  We could follow up at that time but also Below is the process I recommend for the low FODMAP diet.    If you decide to retry a full low FODMAP diet, then here are the directions:    - Discontinue eating higher FODMAP foods for at least 3-4 weeks.     -If you have some relief of symptoms/bloating, after the 3-4 weeks, then you will start adding those higher FODMAP foods back into diet. (see process for reintroducing below).    OR   If you prefer to do a partial lower FODMAP diet, then    -For Modified/partial lower FODMAP diet, review the list of high FODMAP foods  and then eliminate several of those higher FODMAP foods that you eat most days of the week for 3-4 weeks.    If you have some relief of symptoms after the elimination phase of the low FODMAP diet/ after at least 3-4 weeks, then you will start reintroducing higher FODMAP foods back into your diet:    -Start by adding back in 1 higher fodmap food at a time by eating a small serving of that food each day for 3 days in a row OR you can gradually increase the portion size over the course of the 3 days. For example on day 1, can have 1/4 cup serving, then on day 2, can have 1/3 c and then on day 3, can have a 1/2 cup serving.    -If tolerated, then wait at least one day, and then add another higher FODMAP food back into diet for 3 days in a row and onward.     -If you prefer to reintroduce foods slowly, then you could add one food each week (depending on how quickly you'd like to re-introduce and also depending on if you have a return of symptoms that may linger for a day or so).    -If you notice any symptoms after adding back in a higher FODMAP food than can wait until symptoms improve before trying the next higher FODMAP food.     -Also if you notice a significant increase in symptoms after re-trying the first day, you do not need to eat on additional days.    -When reintroducing higher FODMAP foods, track what food you retried, the amount you tried each day and any symptoms you experience.    --Plan menus and meals ahead of time to help you stick to the elimination diet.    Here are some websites with low FODMAP recipes:    Www.fodmapeveryday.com  Www.Altar.[employer]  IBSfree.net    [employer] at www.[employer] sells ready made low FODMAP meals.     Nutrition Monitoring and Evaluation: Will monitor adherence to nutrition recommendations at future RD visits.     Further Medical Nutrition Therapy:  Follow-up as needed. She will plan to follow up as needed. Encouraged her to follow up if decides to follow low  fodmap in the future or has any other positive testing requiring other diet modification/recommendations.    Patient was encouraged to contact RD with any further questions.    Sheron Rodrigez MS, RD, LD

## 2025-01-23 NOTE — TELEPHONE ENCOUNTER
RN called and spoke with patient and let her know that Dr. Xiao does not see for this diagnosis. Patient was told by [facility] that they do not see for diagnosis either. RN passed on that Dr. Xiao mentioned that patient might want to check to see if Rheumatology is able to assist with  possible Chronic granulomatous disease diagnosis. Patient is going to reach out to Rheumatology and see if  they are able to see her for this diagnosis and offer her some answers. Patient appreciated the help in possibly finding a new direction to look into and liked that she may be able to go back to somewhere she already has established care with DR. Henry.     Starla Maher RN

## 2025-01-23 NOTE — NURSING NOTE
Current patient location: [address] [place] [id number]    Is the patient currently in the state of [place]? YES    Visit mode: VIDEO    If the visit is dropped, the patient can be reconnected by:VIDEO VISIT: Text to cell phone:   Telephone Information:   Mobile [phone]       Will anyone else be joining the visit? NO  (If patient encounters technical issues they should call [phone]    Are changes needed to the allergy or medication list? No    Are refills needed on medications prescribed by this physician? NO    Rooming Documentation:  Questionnaire(s) completed    Reason for visit: Consult    Marti LAIF

## 2025-01-23 NOTE — LETTER
"1/23/2025      Stacey Zavala  [address] [facility] [address]      Dear Colleague,    Thank you for referring your patient, Stacey Zavala, to the [facility] [place]. Please see a copy of my visit note below.    Virtual Visit Details    Type of service:  Video Visit     Originating Location (pt. Location): Home  Distant Location (provider location):  Off-site  Platform used for Video Visit: [facility] Outpatient Medical Nutrition Therapy      Time Spent:  60 minutes  Session Type:  Initial   Referring Physician:  Althea Springer PA-C  Reason for RD Visit:   chronic diarrhea     Nutrition Assessment:  Patient is a 34 year old female with history that includes migraines, HLA B27 positive, family history of ankylosing spondylitis, pelvic floor dysfunction, lactose intolerance, chronic diarrhea, epigastric pain, abdominal pain, increased CRP. Follows a Vegan diet.    She stated that she has 1 BM per day. She was having watery diarrhea for that one BM each day for about a 5 month period, but now ever since her colonoscopy, she has been have 1 long bowel movement per day and no longer having diarrhea. Her BM was a little different and wasn't as long but was formed. She stated that she used to have constipation all of the time and then would only have 1 really bad bout of diarrhea during her menstrual cycle and maybe a few \"not great\" BMs around the time of her period monthly. In 2015, she was having an issues with a lot of \"horrible diarrhea\" to the point she felt like she could pass out. At that time her doctor told her to try a low FODMAP diet. She tried it at that time, but since she follows a vegan diet, it was difficult for her to follow and she wasn't sure that it was helpful. Additionally she had a trip out of state, so she ate just a little bit of granola the night before her trip and had diarrhea the next day and then had diarrhea her entire trip at that " time in 2015. She used to eat a lot of apples, so on another occasion, when she was eating a low FODMAP diet, she decided to try hot apple cider b/c she didn't think the low FODMAP changed her symptoms, but the next day she had diarrhea so wonders if possibly she may have some sensitivity to fodmaps. Due to following a vegan diet, she found it difficult to follow a low fodmap diet because she eats beans and chickpeas daily as well as several other high fodmap foods. She had a normal colonoscopy and terminal ileum were normal at her recent Cscopy in 12/2024. She has some additional labs and testing this week as well. Overall though since her cscopy in 12/2024, last month, her BMs are back to normal and she is feeling well. She does have a lot of belching and gets reflux. She notices the reflux when she is using her dumbbells at home and sometimes when she lies down to do to bed. She tends to eat dinner later and lies down about 1-2 hours after dinner. She doesn't typically snack so eats 3 meals per day. Dinner is typically her larger meal of the day. She drinks sparkling water with lunch and dinner meals, drinks ~50-60 oz of water, lg cup coffee in AM with soy creamer, and has 0-3 cups of tea (could be black, green or herbal tea) per day. She doesn't drink any alcohol. Even just sips give her migraines and make her nauseous so avoids altogether.     Based on conversation today, since her BMs are normal now, she would like to try some ideas discussed today (see goals below) and not retry a low FODMAP diet at this time. If she has a return of symptoms, then she may want to retry at a future time.     Patient Active Problem List   Diagnosis     Papanicolaou smear of cervix with low grade squamous intraepithelial lesion (LGSIL)     Septate uterus     Family history of ankylosing spondylitis     Migraine without aura and without status migrainosus, not intractable     HLA B27 (HLA B27 positive)     Sacral pain     Pelvic  "pain in female     Height:   Ht Readings from Last 1 Encounters:   12/19/24 1.626 m (5' 4\")   ]  Weight:  Wt Readings from Last 10 Encounters:   12/19/24 74.4 kg (164 lb 1.6 oz)   11/18/24 73.9 kg (163 lb)   08/01/24 69.9 kg (154 lb)   07/09/24 69.9 kg (154 lb)   03/27/24 69.9 kg (154 lb)   03/31/23 72.6 kg (160 lb)   03/08/23 71.2 kg (157 lb)   06/26/19 67.1 kg (148 lb)   04/30/18 65.3 kg (144 lb)   04/14/17 64.4 kg (142 lb)      BMI: Estimated body mass index is 28.17 kg/m  as calculated from the following:    Height as of 12/19/24: 1.626 m (5' 4\").    Weight as of 12/19/24: 74.4 kg (164 lb 1.6 oz).    Diet Recall:  (some usual/recent meals/snacks/beverages): she follows a vegan diet. She eats 3 meals per day  Meal Food    Breakfast Oatmeal with PB, blueberries, 1T grd flax and 1T hemp hearts   Lunch Now leftovers or previously her usual For a long time salad: kale with chick peas, homemade vegan cashew dressing   Dinner King burrito or tofu stir singer or bean chili or portabello mushroom cap   Snacks Sometimes craves pc chocolate in evening or occas bowl cereal (PB kashi crunch) or carrots or hummus   Beverages Lg cup Coffee with soy creamer, 50-60 oz Water, with lunch and dinner, sparkling water, in AM with Bkft, sm amt OJ (4-6 oz OJ mixed with sparkling water) and 0-3 cup hot tea (green, blk, herbal)   Alcohol Intake None (doesn't tolerate it, it gives her a headache and nauseous)     Frequency of eating/taking out meals: 1-2x/week on average.     Labs:    Last Comprehensive Metabolic Panel:  Sodium   Date Value Ref Range Status   11/18/2024 142 135 - 145 mmol/L Final     Potassium   Date Value Ref Range Status   11/18/2024 4.4 3.4 - 5.3 mmol/L Final   05/12/2015 3.9 mmol/L Final     Chloride   Date Value Ref Range Status   11/18/2024 107 98 - 107 mmol/L Final     Carbon Dioxide (CO2)   Date Value Ref Range Status   11/18/2024 23 22 - 29 mmol/L Final     Anion Gap   Date Value Ref Range Status   11/18/2024 12 7 - " 15 mmol/L Final     Glucose   Date Value Ref Range Status   11/18/2024 90 70 - 99 mg/dL Final   05/12/2015 88 70 - 99 mg/dL Final     Urea Nitrogen   Date Value Ref Range Status   11/18/2024 12.0 6.0 - 20.0 mg/dL Final     Creatinine   Date Value Ref Range Status   11/18/2024 0.80 0.51 - 0.95 mg/dL Final   05/12/2015 0.82 mg/dL Final     GFR Estimate   Date Value Ref Range Status   11/18/2024 >90 >60 mL/min/1.73m2 Final     Comment:     eGFR calculated using 2021 CKD-EPI equation.   05/12/2015 101 ml/min/1.73m2 Final     Calcium   Date Value Ref Range Status   11/18/2024 9.4 8.8 - 10.4 mg/dL Final     Comment:     Reference intervals for this test were updated on 7/16/2024 to reflect our healthy population more accurately. There may be differences in the flagging of prior results with similar values performed with this method. Those prior results can be interpreted in the context of the updated reference intervals.     CBC RESULTS:   Recent Labs   Lab Test 11/18/24  1705   WBC 6.6   RBC 4.49   HGB 14.3   HCT 42.7   MCV 95   MCH 31.8   MCHC 33.5   RDW 11.9          Pertinent Medications/vitamin and mineral supplements:      Current Outpatient Medications   Medication Sig Dispense Refill     bisacodyl (DULCOLAX) 5 MG EC tablet Take 2 tablets at 3 pm the day before your procedure. If your procedure is before 11 am, take 2 additional tablets at 11 pm. If your procedure is after 11 am, take 2 additional tablets at 6 am. For additional instructions refer to your colonoscopy prep instructions. 4 tablet 0     cholecalciferol (VITAMIN D3) 25 mcg (1000 units) capsule Take 1 capsule by mouth daily       Cyanocobalamin (B-12) 1000 MCG CAPS Take 1,000 mcg by mouth daily       multivitamin w/minerals (MULTI-VITAMIN) tablet Take 1 tablet by mouth daily       norethindrone (AYGESTIN) 5 MG tablet Take 1 tablet (5 mg) by mouth daily 90 tablet 1     norethindrone-ethinyl estradiol-iron (JUNEL FE 1.5/30) 1.5-30 MG-MCG tablet  TAKE 1 TABLET BY MOUTH DAILY. SKIP PLACEBO EVERY OTHER MONTH 84 tablet 3     omega 3 1000 MG CAPS        polyethylene glycol (GOLYTELY) 236 g suspension The night before the exam at 6 pm drink an 8-ounce glass every 15 minutes until the jug is half empty. If you arrive before 11 AM: Drink the other half of the Golytely jug at 11 PM night before procedure. If you arrive after 11 AM: Drink the other half of the Golytely jug at 6 AM day of procedure. For additional instructions refer to your colonoscopy prep instructions. 4000 mL 0     No current facility-administered medications for this visit.     Food Allergies:  lactose    Physical Activity:  5x/week, walks for 30 mins on lunch break and 30 min dumbbells, 3-5 times per week, sometimes recently started adding some walking/running after doing her dumbbells 3-5 times per week. On a weekend day, if not too cold, goes for a 5 mile hike.    MALNUTRITION:  % Weight Loss:  None noted  % Intake:  No decreased intake noted  Subcutaneous Fat Loss:  None observed  Muscle Loss:  None observed  Fluid Retention:  None noted    Malnutrition Diagnosis: Patient does not meet two of the above criteria necessary for diagnosing malnutrition    Nutrition Prescription: Nutrition Education     Nutrition Intervention      Provided diet education for chronic diarrhea, belching, epigastric pain, hx constipation.    Answered patient's questions. Patient verbalized understanding of education provided. See Goals below.     Goals:    1. Aim for eating smaller meals such as 4-6 smaller meals per day versus fewer larger meals as this may be better tolerated.    --eat meals and snacks on a consistent eating pattern too.    2. Have a planned afternoon snack (between lunch and dinner meal) as this may help prevent overeating at dinner meal.    3. Eat a higher fiber diet. General goal is to consume about 25-30 grams of fiber per day.     --When increasing fiber in diet, do so gradually and do not eat  too much fiber all of a sudden and also try not to load up on a lot of fiber all at one meal.    --Especially include/continue to include some good sources of soluble fiber in your diet such as oats,  barley, sweet potato, potato, brussels sprouts, broccoli, evelia seeds, flaxseeds, avocado, legumes/beans, figs, apples, banana, pear.    --continue having your usual breakfast with oatmeal, ground flax for example.    4. Continue taking Citrucel daily.    5. Discontinue drinking sparkling water/carbonated beverages as these can contribute to bloating, gas, stomach pain, belching and reflux.    --at lunch and dinner you can switch to having non carbonated water or could have some herbal tea (cold or hot whichever you prefer).    6. Continue to drink at least 48 oz-64 oz water or more per day.    7. At dinner, avoid distractions while eating such as watching TV or using a phone or computer. Sit at the table and stop eating before you feel too full. (This can prevent overeating at dinner and may decrease issues with reflux in the evening and overnight).    8. Wait at least 2-3 hours after eating before lying down and going to sleep. (This can help prevent issues with reflux overnight).    --If you know ahead of time that you will work later and will workout that night before eating, then you could prepare you meal ahead of time and then can reheat a serving for dinner, so this may allow you to eat sooner in the evening.    8. Since you have been feeling better, you don't need to follow a low FODMAP diet at this time. If you have a return of symptoms/diarrhea, then you could re-try the low FODMAP diet, if you'd like to.  We could follow up at that time but also Below is the process I recommend for the low FODMAP diet.    If you decide to retry a full low FODMAP diet, then here are the directions:    - Discontinue eating higher FODMAP foods for at least 3-4 weeks.     -If you have some relief of symptoms/bloating, after the  3-4 weeks, then you will start adding those higher FODMAP foods back into diet. (see process for reintroducing below).    OR   If you prefer to do a partial lower FODMAP diet, then    -For Modified/partial lower FODMAP diet, review the list of high FODMAP foods and then eliminate several of those higher FODMAP foods that you eat most days of the week for 3-4 weeks.    If you have some relief of symptoms after the elimination phase of the low FODMAP diet/ after at least 3-4 weeks, then you will start reintroducing higher FODMAP foods back into your diet:    -Start by adding back in 1 higher fodmap food at a time by eating a small serving of that food each day for 3 days in a row OR you can gradually increase the portion size over the course of the 3 days. For example on day 1, can have 1/4 cup serving, then on day 2, can have 1/3 c and then on day 3, can have a 1/2 cup serving.    -If tolerated, then wait at least one day, and then add another higher FODMAP food back into diet for 3 days in a row and onward.     -If you prefer to reintroduce foods slowly, then you could add one food each week (depending on how quickly you'd like to re-introduce and also depending on if you have a return of symptoms that may linger for a day or so).    -If you notice any symptoms after adding back in a higher FODMAP food than can wait until symptoms improve before trying the next higher FODMAP food.     -Also if you notice a significant increase in symptoms after re-trying the first day, you do not need to eat on additional days.    -When reintroducing higher FODMAP foods, track what food you retried, the amount you tried each day and any symptoms you experience.    --Plan menus and meals ahead of time to help you stick to the elimination diet.    Here are some websites with low FODMAP recipes:    Www.fodmapeveryday.com  Www.[employer].com  IBSfree.net    Modify Health at www.[employer] sells ready made low FODMAP meals.      Nutrition Monitoring and Evaluation: Will monitor adherence to nutrition recommendations at future RD visits.     Further Medical Nutrition Therapy:  Follow-up as needed. She will plan to follow up as needed. Encouraged her to follow up if decides to follow low fodmap in the future or has any other positive testing requiring other diet modification/recommendations.    Patient was encouraged to contact RD with any further questions.    Sheron Rodrigez, MS, RD, LD          Again, thank you for allowing me to participate in the care of your patient.        Sincerely,        Sheron Rodrigez RD    Electronically signed

## 2025-01-24 ENCOUNTER — HOSPITAL ENCOUNTER (OUTPATIENT)
Dept: NUCLEAR MEDICINE | Facility: CLINIC | Age: 35
Setting detail: NUCLEAR MEDICINE
Discharge: HOME | End: 2025-01-24
Attending: PHYSICIAN ASSISTANT | Admitting: PHYSICIAN ASSISTANT
Payer: COMMERCIAL

## 2025-01-24 ENCOUNTER — LAB (OUTPATIENT)
Dept: LAB | Facility: CLINIC | Age: 35
End: 2025-01-24
Attending: STUDENT IN AN ORGANIZED HEALTH CARE EDUCATION/TRAINING PROGRAM
Payer: COMMERCIAL

## 2025-01-24 DIAGNOSIS — R10.13 EPIGASTRIC PAIN: ICD-10-CM

## 2025-01-24 DIAGNOSIS — R19.8 POSITIVE MURPHY'S SIGN: ICD-10-CM

## 2025-01-24 DIAGNOSIS — K52.9 CHRONIC DIARRHEA: ICD-10-CM

## 2025-01-24 LAB — IGA SERPL-MCNC: 236 MG/DL (ref 84–499)

## 2025-01-24 PROCEDURE — 78227 HEPATOBIL SYST IMAGE W/DRUG: CPT

## 2025-01-24 PROCEDURE — 82784 ASSAY IGA/IGD/IGG/IGM EACH: CPT

## 2025-01-24 PROCEDURE — 343N000001 HC RX 343 MED OP 636: Performed by: PHYSICIAN ASSISTANT

## 2025-01-24 PROCEDURE — 78227 HEPATOBIL SYST IMAGE W/DRUG: CPT | Mod: 26 | Performed by: RADIOLOGY

## 2025-01-24 PROCEDURE — 86364 TISS TRNSGLTMNASE EA IG CLAS: CPT

## 2025-01-24 PROCEDURE — 36415 COLL VENOUS BLD VENIPUNCTURE: CPT

## 2025-01-24 PROCEDURE — A9537 TC99M MEBROFENIN: HCPCS | Performed by: PHYSICIAN ASSISTANT

## 2025-01-24 RX ORDER — MORPHINE SULFATE 2 MG/ML
2 INJECTION, SOLUTION INTRAMUSCULAR; INTRAVENOUS ONCE
Status: DISCONTINUED | OUTPATIENT
Start: 2025-01-24 | End: 2025-01-24

## 2025-01-24 RX ORDER — KIT FOR THE PREPARATION OF TECHNETIUM TC 99M MEBROFENIN 45 MG/10ML
4.8-7.2 INJECTION, POWDER, LYOPHILIZED, FOR SOLUTION INTRAVENOUS ONCE
Status: COMPLETED | OUTPATIENT
Start: 2025-01-24 | End: 2025-01-24

## 2025-01-24 RX ADMIN — MEBROFENIN 6 MILLICURIE: 45 INJECTION, POWDER, LYOPHILIZED, FOR SOLUTION INTRAVENOUS at 10:48

## 2025-01-26 ENCOUNTER — MYC MEDICAL ADVICE (OUTPATIENT)
Dept: GASTROENTEROLOGY | Facility: CLINIC | Age: 35
End: 2025-01-26
Payer: COMMERCIAL

## 2025-01-26 DIAGNOSIS — N94.10 DYSPAREUNIA IN FEMALE: ICD-10-CM

## 2025-01-26 DIAGNOSIS — R10.2 PELVIC PAIN IN FEMALE: ICD-10-CM

## 2025-01-26 DIAGNOSIS — K52.9 CHRONIC DIARRHEA: Primary | ICD-10-CM

## 2025-01-29 ENCOUNTER — MYC REFILL (OUTPATIENT)
Dept: OBGYN | Facility: CLINIC | Age: 35
End: 2025-01-29
Payer: COMMERCIAL

## 2025-01-29 DIAGNOSIS — R10.2 PELVIC PAIN IN FEMALE: ICD-10-CM

## 2025-01-29 DIAGNOSIS — N94.10 DYSPAREUNIA IN FEMALE: ICD-10-CM

## 2025-01-29 LAB
TTG IGA SER-ACNC: 0.7 U/ML
TTG IGG SER-ACNC: 0.7 U/ML

## 2025-01-29 RX ORDER — NORETHINDRONE 5 MG/1
5 TABLET ORAL DAILY
Qty: 90 TABLET | Refills: 0 | Status: SHIPPED | OUTPATIENT
Start: 2025-01-29

## 2025-01-29 NOTE — TELEPHONE ENCOUNTER
Called patient and scheduled follow up appointment in 3 weeks.   2/19/2025 at  clinic.     Laxmi COX RN -  OB/GYN group

## 2025-01-29 NOTE — TELEPHONE ENCOUNTER
Requested Prescriptions   Pending Prescriptions Disp Refills    norethindrone (AYGESTIN) 5 MG tablet 90 tablet 1     Sig: Take 1 tablet (5 mg) by mouth daily.       There is no refill protocol information for this order        Last seen: 8/01/2024  Last refilled: 8/01/2024 for 90 tabs & 1 refill     Routing to provider for eval/authorization.     Laxmi COX RN -  OB/GYN group

## 2025-01-30 ENCOUNTER — APPOINTMENT (OUTPATIENT)
Dept: LAB | Facility: CLINIC | Age: 35
End: 2025-01-30
Payer: COMMERCIAL

## 2025-01-30 PROCEDURE — 83993 ASSAY FOR CALPROTECTIN FECAL: CPT | Performed by: PHYSICIAN ASSISTANT

## 2025-01-30 PROCEDURE — 82653 EL-1 FECAL QUANTITATIVE: CPT | Performed by: PHYSICIAN ASSISTANT

## 2025-01-30 PROCEDURE — 82705 FATS/LIPIDS FECES QUAL: CPT | Performed by: PHYSICIAN ASSISTANT

## 2025-02-05 ENCOUNTER — TELEPHONE (OUTPATIENT)
Dept: CONSULT | Facility: CLINIC | Age: 35
End: 2025-02-05
Payer: COMMERCIAL

## 2025-02-05 NOTE — TELEPHONE ENCOUNTER
[facility] Call Center    Phone Message    May a detailed message be left on voicemail: yes     Reason for Call: Other: Stacey called in to get scheduled with a appointment with Genetics per referral.Stacey would like a call back.       Action Taken: Message routed to:  Other: Adult Genetics     Travel Screening: Not Applicable     Date of Service:

## 2025-02-06 NOTE — TELEPHONE ENCOUNTER
Spoke with patient and assisted in scheduling appointment.     Future Appointments   Date Time Provider Department Center   2/11/2025 12:00 PM Rema Chavez [facility]

## 2025-02-07 ENCOUNTER — ANCILLARY PROCEDURE (OUTPATIENT)
Dept: MRI IMAGING | Facility: CLINIC | Age: 35
End: 2025-02-07
Attending: PHYSICIAN ASSISTANT
Payer: COMMERCIAL

## 2025-02-07 DIAGNOSIS — R79.82 ELEVATED C-REACTIVE PROTEIN: ICD-10-CM

## 2025-02-07 DIAGNOSIS — K52.9 CHRONIC DIARRHEA: ICD-10-CM

## 2025-02-07 PROCEDURE — 72197 MRI PELVIS W/O & W/DYE: CPT

## 2025-02-07 PROCEDURE — 255N000002 HC RX 255 OP 636: Performed by: PHYSICIAN ASSISTANT

## 2025-02-07 PROCEDURE — A9585 GADOBUTROL INJECTION: HCPCS | Performed by: PHYSICIAN ASSISTANT

## 2025-02-07 PROCEDURE — 74183 MRI ABD W/O CNTR FLWD CNTR: CPT

## 2025-02-07 RX ORDER — GADOBUTROL 604.72 MG/ML
7.5 INJECTION INTRAVENOUS ONCE
Status: COMPLETED | OUTPATIENT
Start: 2025-02-07 | End: 2025-02-07

## 2025-02-07 RX ORDER — GADOBUTROL 604.72 MG/ML
50 INJECTION INTRAVENOUS ONCE
OUTPATIENT
Start: 2025-02-07 | End: 2025-02-07

## 2025-02-07 RX ADMIN — GADOBUTROL 7.5 ML: 604.72 INJECTION INTRAVENOUS at 08:56

## 2025-02-13 ENCOUNTER — LAB (OUTPATIENT)
Dept: LAB | Facility: CLINIC | Age: 35
End: 2025-02-13
Payer: COMMERCIAL

## 2025-02-13 DIAGNOSIS — R93.5 ABNORMAL CT OF THE ABDOMEN: ICD-10-CM

## 2025-02-13 DIAGNOSIS — D71 CHRONIC GRANULOMATOUS DISEASE (H): ICD-10-CM

## 2025-02-18 ENCOUNTER — VIRTUAL VISIT (OUTPATIENT)
Dept: FAMILY MEDICINE | Facility: CLINIC | Age: 35
End: 2025-02-18
Payer: COMMERCIAL

## 2025-02-18 ENCOUNTER — APPOINTMENT (OUTPATIENT)
Dept: LAB | Facility: CLINIC | Age: 35
End: 2025-02-18
Payer: COMMERCIAL

## 2025-02-18 DIAGNOSIS — J02.0 STREP THROAT: Primary | ICD-10-CM

## 2025-02-18 DIAGNOSIS — R09.81 NASAL CONGESTION: ICD-10-CM

## 2025-02-18 LAB
DEPRECATED S PYO AG THROAT QL EIA: NEGATIVE
S PYO DNA THROAT QL NAA+PROBE: NOT DETECTED

## 2025-02-18 PROCEDURE — 87651 STREP A DNA AMP PROBE: CPT | Performed by: FAMILY MEDICINE

## 2025-02-18 PROCEDURE — 98005 SYNCH AUDIO-VIDEO EST LOW 20: CPT | Performed by: FAMILY MEDICINE

## 2025-02-18 NOTE — PROGRESS NOTES
Stacey is a 34 year old who is being evaluated via a billable video visit.    How would you like to obtain your AVS? MyChart  If the video visit is dropped, the invitation should be resent by: Text to cell phone: [phone]  Will anyone else be joining your video visit? No      Assessment & Plan       ICD-10-CM    1. Strep throat  J02.0 Streptococcus A Rapid Screen w/Reflex to PCR - Clinic Collect      2. Nasal congestion  R09.81         Recent complaints of nasal congestion , and bad sore throat, no fever, no sick contact, no malaise.  Covid at home was neg.  Advise checking strep throat, declined flu test for now.  Nasal lavage and supportive care for now  Symptoms only for 2 days  Advised close monitoring  Follow up as needed                    Subjective   Stacey is a 34 year old, presenting for the following health issues:  No chief complaint on file.        2/18/2025     9:28 AM   Additional Questions   Roomed by Maye     History of Present Illness       Reason for visit:  Headache for two days and then soar throat started  Symptom onset:  1-3 days ago  Symptoms include:  Sore throat and headache  Symptom intensity:  Moderate  Symptom progression:  Staying the same      Sore throat unbearable started last week  Lethargic last week  Nausea at times  At home covid test was neg        Review of Systems  Constitutional, HEENT, cardiovascular, pulmonary, GI, , musculoskeletal, neuro, skin, endocrine and psych systems are negative, except as otherwise noted.      Objective           Vitals:  No vitals were obtained today due to virtual visit.    Physical Exam   GENERAL: alert and no distress  EYES: Eyes grossly normal to inspection.  No discharge or erythema, or obvious scleral/conjunctival abnormalities.  RESP: No audible wheeze, cough, or visible cyanosis.    SKIN: Visible skin clear. No significant rash, abnormal pigmentation or lesions.  NEURO: Cranial nerves grossly intact.  Mentation and speech  appropriate for age.  PSYCH: Appropriate affect, tone, and pace of words    Lab on 02/13/2025   Component Date Value Ref Range Status    Specimen Status 02/13/2025 Specimen received. Reordered and sent to performing laboratory. Report to follow upon completion.   Final    Performing Laboratory 02/13/2025 [facility]   Final    Test Name 02/13/2025 Custom Panel   Final    Test Code 02/13/2025 [id number], CYBA, CYBB, NCF2, NCF4   Final         Video-Visit Details    Type of service:  Video Visit   Originating Location (pt. Location): Home    Distant Location (provider location):  On-site  Platform used for Video Visit: Oleksandr  Signed Electronically by: Maggie Poon DO

## 2025-02-19 ENCOUNTER — VIRTUAL VISIT (OUTPATIENT)
Dept: OBGYN | Facility: CLINIC | Age: 35
End: 2025-02-19
Payer: COMMERCIAL

## 2025-02-19 DIAGNOSIS — R82.90 ABNORMAL URINE: ICD-10-CM

## 2025-02-19 DIAGNOSIS — R10.2 PELVIC PAIN IN FEMALE: Primary | ICD-10-CM

## 2025-02-19 PROCEDURE — 98016 BRIEF COMUNICAJ TECH-BSD SVC: CPT

## 2025-02-19 NOTE — PATIENT INSTRUCTIONS
If you have labs or imaging done, the results will automatically release in [facility] without an interpretation.  Your health care professional will review those results and send an interpretation with recommendations as soon as possible, but this may be 1-3 business days.    If you have any questions regarding your visit, please contact your care team.     [facility]: [phone]  [facility] CLINIC HOURS TELEPHONE NUMBER   Melly Hill, ARA Cristina-SHAILESH Villatoro-SHAILESH Cannon-Surgery Scheduler  Nava-       Monday- [place]  8:00 am-4:00 pm    Tuesday- [place]  8:00 am-4:00 pm    Wednesday- [place] 8:00 am-4:00 pm    Thursday- [place] 8:00 am-4:00 pm    Friday- [place]  8:00 am-4:00 pm [facility]  [address] [place]  [place] [address]  PH: [phone]  Fax: [phone]    Imaging Scheduling all locations  PH: [phone]     [facility]  [address] [place] [address] [address].  [place], [place] [address] [phone]    [facility]  [address] [place] [address]  PH: [phone]     **Surgeries** Our Surgery Schedulers will contact you to schedule. If you do not receive a call within 3 business days, please call [phone].  Urgent Care locations:  [facility]       Monday-Friday   10 am - 8 pm    Saturday and Sunday   9 am - 5 pm   [phone]   If you need a medication refill, please contact your pharmacy. Please allow 3 business days for your refill to be completed.  As always, Thank you for trusting us with your healthcare needs!

## 2025-02-19 NOTE — PROGRESS NOTES
"Stacey is a 34 year old who is being evaluated via a billable telephone visit.    What phone number would you like to be contacted at? [phone]  How would you like to obtain your AVS? Bhupinder  Originating Location (pt. Location): Home    Distant Location (provider location):  On-site  Telephone visit completed due to the patient did not have access to video, while the distant provider did.    Assessment & Plan     (R10.2) Pelvic pain in female  (primary encounter diagnosis)  Comment: We discussed that since her pain around and during her period has significantly improved compared to when she was on a combined OCP she can continue Aygestin at this time. Since she is having occasional sharp pelvic pain episodes as noted in her HPI. We did discuss trial of increased dosage of Aygestin 10mg daily to see if any relief is achieved with this. She is agreeable and will start taking 10mg daily to see if there is an improvement. We discussed taking for 1-2 months before assessing how she is doing. We also discussed that since GI cause has been ruled out, and her Aygestin has seemed to help significantly with her pelvic pain related to her cycle, it is worth considering a discussion with Uro/Gyn at this time to see if any further evaluation should be done for possible urinary cause. She is wanting referral placed at this time. Also agreeable to coming in to clinic to leave a UA/UC.  Plan: UA Macroscopic with reflex to Microscopic and         Culture - Lab Collect, Adult Uro/Gyn          Referral          (R82.90) Abnormal urine  Plan: UA Macroscopic with reflex to Microscopic and         Culture - Lab Collect          BMI  Estimated body mass index is 28.17 kg/m  as calculated from the following:    Height as of 12/19/24: 1.626 m (5' 4\").    Weight as of 12/19/24: 74.4 kg (164 lb 1.6 oz).     Subjective   Stacey is a 34 year old, presenting for the following health issues:  Recheck Medication (Sharp pain above " bladder area is still present. No other concerns. Period has stopped)    F/U Pelvic pain: seen on 8/1/24 for pelvic pain. At that time, she was taking COCs and was not achieving relief. She has noted hx of rotating constipation/diarrhea and recently saw GI for work up for her pelvic pain and was told this was most likely not causing her pain. She was started on Aygestin 5mg at her last visit and has been taking with much relief since. Her only complaint today is that she will still occasionally have really sharp low, central pelvic for for brief time periods (~30 seconds or so). She has had an improvement in these episodes since switching to Aygestin as well, however, hoping we can figure out what is causing these random sharp pain episodes. She also reports she has noticed that her urine is cloudy and foul smelling lately, so she wonders if she has a UTI and that is what it causing her sharp pelvic pain.      Objective       Vitals:  No vitals were obtained today due to virtual visit.    Physical Exam   General: Alert and no distress //Respiratory: No audible wheeze, cough, or shortness of breath // Psychiatric:  Appropriate affect, tone, and pace of words      Phone call duration: 9 minutes  Signed Electronically by: RADHA Michele CNP

## 2025-02-20 ENCOUNTER — VIRTUAL VISIT (OUTPATIENT)
Dept: GASTROENTEROLOGY | Facility: CLINIC | Age: 35
End: 2025-02-20
Attending: PHYSICIAN ASSISTANT
Payer: COMMERCIAL

## 2025-02-20 ENCOUNTER — LAB (OUTPATIENT)
Dept: LAB | Facility: CLINIC | Age: 35
End: 2025-02-20
Payer: COMMERCIAL

## 2025-02-20 VITALS — BODY MASS INDEX: 28.35 KG/M2 | WEIGHT: 160 LBS | HEIGHT: 63 IN

## 2025-02-20 DIAGNOSIS — R19.8 IRREGULAR BOWEL HABITS: Primary | ICD-10-CM

## 2025-02-20 DIAGNOSIS — R82.90 ABNORMAL URINE: ICD-10-CM

## 2025-02-20 DIAGNOSIS — R10.2 PELVIC PAIN IN FEMALE: ICD-10-CM

## 2025-02-20 DIAGNOSIS — K82.8 BILIARY DYSKINESIA: ICD-10-CM

## 2025-02-20 DIAGNOSIS — R10.13 EPIGASTRIC PAIN: ICD-10-CM

## 2025-02-20 LAB
ALBUMIN UR-MCNC: NEGATIVE MG/DL
APPEARANCE UR: CLEAR
BILIRUB UR QL STRIP: NEGATIVE
COLOR UR AUTO: YELLOW
GLUCOSE UR STRIP-MCNC: NEGATIVE MG/DL
HGB UR QL STRIP: NEGATIVE
KETONES UR STRIP-MCNC: 15 MG/DL
LEUKOCYTE ESTERASE UR QL STRIP: NEGATIVE
NITRATE UR QL: NEGATIVE
PH UR STRIP: 5.5 [PH] (ref 5–7)
SP GR UR STRIP: 1.01 (ref 1–1.03)
UROBILINOGEN UR STRIP-ACNC: 0.2 E.U./DL

## 2025-02-20 ASSESSMENT — PAIN SCALES - GENERAL: PAINLEVEL_OUTOF10: MILD PAIN (3)

## 2025-02-20 NOTE — NURSING NOTE
Current patient location: Patient declined to provide     Is the patient currently in the state of [place]? YES    Visit mode: VIDEO    If the visit is dropped, the patient can be reconnected by:VIDEO VISIT: Text to cell phone:   Telephone Information:   Mobile [phone]       Will anyone else be joining the visit? NO  (If patient encounters technical issues they should call [phone]    Are changes needed to the allergy or medication list? No    Are refills needed on medications prescribed by this physician? YES    Rooming Documentation:  Questionnaire(s) completed    Reason for visit: RECHECK (F/U)    Miesha THOMAS

## 2025-02-20 NOTE — PROGRESS NOTES
Virtual Visit Details    Type of service:  Video Visit     Originating Location (pt. Location): Home    Distant Location (provider location):  On-site  Platform used for Video Visit: Oleksandr    Joined the call at invalid date.  Left the call at 2/20/2025, 8:57:53 am.  You were on the call for 23 min     GI CLINIC VISIT    ASSESSMENT/PLAN:  Stacey Zavala is a 34 year old year old female with PMHx of listed lactose intolerance, HLA B27 positive, pelvic floor dysfunction (in PT) following with the [facility] group for diarrhea/changes in stooling that started in summer 2024.     FHX significant for PGF with CRC, IBS in Aunt     #changes in stooling, predominate diarrhea  #lower abdomen pain   #pelvic pain  All her life she had trouble with constipation (upwards of no BM for 5d) but mid 2024, she shifted to a predominate diarrhea pattern that was partially responsive to a low fodmap diet. No bloody or black stools. Weight stable. She had work up at her primary to include CRP to 7s, normal CBC, CMP, TSH.  CTAP 12/2024 showed no bowel inflammation, obstruction or explanation for the change in BMs. It did show signs of chronic granulomatous disease so she is currently undergoing evaluation for this. Enteric panel was POS for Norovirus (11/2024). Ultimately a colonoscopy was done 12/2024 that was unremarkable with NEG random colon biopsies. Terminal ileum was normal. She met with General GI where fecal fat, fecal calpro, fecal elastase and celiac serologies were unremarkable. MRE did not show inflammation to small intestines. She was asked to start a daily fiber and meet with GI RD, consulted on 1/23/2025. She is vegan. Sx are thought driven by functional diarrhea acutely worsened by Norovirus infection (11/2024) with ddx to include SIBO/IMO and other carb malabsorption.     - Her symptoms improved significantly in the last several months, now on daily citrucel powder; cont   - She continues with dietary measures discussed  with RD.   - She will reach out to pelvic floor physical therapy for follow-up  - she is working with GYN for pelvic pain and will be seeing UroGYN    Future consideration  1.Imodium, BAS  2. EGD, if diarrhea recurs. If pursued, would recommend duodenal biopsies. Consider HLA genotyping for celiac permissive genes   3. Consider utility of VCE, though no HÉCTOR, ferritin was 31  4. Breath testing   5. Trials of holding gluten/dairy x 2 weeks each with diary  6. Bowel clean out   7. PF eval with dedicated defecrography studies    #epigastric pain  #biliary dyskinesia    RUQ US WNL. Hepatic function normal. Valery s/p CCY   -HIDA with EF 32%. She does report some upper abdominal pain prompted by eating fatty food. She can have some nausea with it too. These episode are infrequent given her dietary modifications (1-2x/mo). We discussed this pain could be bilary dyskinesia for which the recommendation would be meeting with GS for discussion of elective CCY. As sx are infrequent, she would prefer to monitor sx for now. ER precautions reviewed.     No orders of the defined types were placed in this encounter.    Colorectal cancer screenin, unless otherwise clinically indicated     RTC - 6-12 mo or sooner PRN     Thank you for this consultation.  It was a pleasure to participate in the care of this patient; please contact me with any further questions.     Althea Springer PA-C  Follow up: As planned above. Today, I personally spent 35  minutes spent on the date of the encounter doing chart review, history and exam, documentation and further activities per the note.    HPI  Stacey Zavala is a 34 year old year old female with PMHx of listed lactose intolerance, HLA B27 positive, pelvic floor dysfunction (in PT) following [facility] for diarrhea/changes in stooling    2024 appt with me   10+ years ago, had a lot of trouble with constipation (requiring laxatives as a child; going upwards of no BM for 5+d). When  "she did stool, she passed a hard firm stool then a lot of diarrhea. Would spend up to 1 hour on the toilet for these stools - accompanied by have dry heaves, nausea. Then cycle repeats    She does not typically have that above bowel pattern any longer  In July 2024, she recognized a noticeable change in stooling pattern -  of having persistent diarrhea. She'd have a few days of frequent loose stools then days with better formed stools     In past 5 months, she'd Bms that look like \"piles of dirt\" and then followed by bouts of diarrhea.   -no bloody or black stools  -does see oily sheen to surface of stools  -sees occasional food particles  -notices more increased mucus in stools    Last 3 days, her stools have been normal appearing, good consistency     Some associated sx of gassiness/bloat  Appetite/weight stable  No nausea/vomiting    Went low fodmap for a bit, helped initially but not sure if its as effective now. Vegan. Needs to reintroduce foods but process is overwhelming (in general)    Recent Cscope - high quality with TI evaluation. Normal macro exam. Random colon biopsies NEG for MC, no IBD.     No known FHX of IBD, celiac disease     Pelvic pain - sharp, severe episodes with onset 2 years. Linked with Bms and a subsequent change in stool consistency (looser)  She also has overall achy abd pain , generalized    In past 6 mo, having multiple days in a week of heartburn sx. Tums helps. 11/2024 H.pylori NEG. Never been on PPI.  -having some intermittent epigastric pain, unclear onset. Mild, 1-2 episodes a week, radiates down abdomen, midline    -No dysphagia, odynphagia, trouble w/ initiation of a swallow, melena, unintentional weight loss, nausea/vomiting    Undergoing evaluation for possible chronic granulomatous disease - CTAP with finding of multiple calcified splenic granulomas. Appt with immunology to be set.     Seen with rheum (7/2024) for chronic back pain and coccygeal pain (both thought " mechanical). MR SI joint without sacroiliitis or enthesitis.     Seen with GYN (8/2024) for ongoing pelvic pain thought MSK vs PF dysfunction, possible endometriosis, IBS. Started on aygestin OCP and sent to PT    Seen with PT for pelvic pain/dysfunction and still in biofeedback therapy    2/20/2025  In last 2 months, shares her diarrhea nearly stopped. She had a few times of looser formed stool but otherwise she has been passing formed stools which she is pleased to report  BM - an occasional skipped day but otherwise regular pattern; usually 1 well formed good volumed stool daily   -good evaluation  -no straining  -not on toilet for a long time  -no bloody or black stools   -no longer seeing oily sheen to surface of stool  On citrucel - 1 tbsp a day -- she feels it is helpful     Abdominal pain - upper abd pain experience 1-2 times a month after she eats fatty food.  Can be associated with nausea.  She feels these episodes are mild and manageable as she moderates her diet.   Sharp pelvic pain around bladder, intermittent. Can be crampy sensation too.    Seen with GYN for this - empiric trial of med for potential endometriosis and she feels it may be helping with return follow up in a few months   -they are considering referral to Uro/Gyn   -she had seen pelvic floor PT for pelvic pain, dyspareunia and completed biofeedback therapy   Seen with Sheron Rodrigez RD - helpful   Some associated sx of gassiness/bloat but shares that it is improved as well. No nausea/vomiting  Appetite/weight stable  No other questions or concerns    Family Hx  Paternal Grandfather - CRC w/ colostomy bag, dx in his 60s   Paternal Aunt - IBS-D   Maternal Aunt - s/p CCY   Dad - diverticular disease, acid reflux   Brother - ankylosing spondylitis     No other known family history or GI related malignancy (esophageal, gastric, pancreatic, liver or colon) or family history of IBD/celiac disease.     Social Hx   Occassional use of NSAIDs or  Tylenol now. Prolonged ibuprofen use in 2020    No ETOH  Never tobacco products   No recreational drug use     Vegan     PROBLEM LIST  Patient Active Problem List    Diagnosis Date Noted    Pelvic pain in female 09/17/2024     Priority: Medium    Sacral pain 05/03/2024     Priority: Medium    HLA B27 (HLA B27 positive) 04/05/2024     Priority: Medium    Family history of ankylosing spondylitis 03/08/2023     Priority: Medium    Migraine without aura and without status migrainosus, not intractable 03/08/2023     Priority: Medium    Septate uterus 04/14/2016     Priority: Medium    Papanicolaou smear of cervix with low grade squamous intraepithelial lesion (LGSIL) 04/01/2015     Priority: Medium     Hx of LSIL Pap  4/2015: colp - EUGENE I (outside records, recommended pap in 6 months)  10/22/15: pap - NIL . Plan pap in 6 months.   4/14/16: Pap - NIL. Plan Pap in 1 year per provider.  06/04/18 Patient is lost to pap tracking follow-up.  06/26/19: NIL Pap. Plan cotest in 3 years.    3/8/23 NIL Pap, Neg HR HPV. Plan: Routine screening.          PERTINENT PAST MEDICAL HISTORY:  Past Medical History:   Diagnosis Date    Papanicolaou smear of cervix with low grade squamous intraepithelial lesion (LGSIL) 04/2015    colp - EUGENE I       PREVIOUS SURGERIES:  Past Surgical History:   Procedure Laterality Date    BIOPSY  April 2015    Cervix    COLONOSCOPY N/A 12/11/2024    Procedure: COLONOSCOPY, WITH POLYPECTOMY AND BIOPSY;  Surgeon: Lidia Valente MD;  Location: [facility] OR    COLONOSCOPY WITH CO2 INSUFFLATION N/A 12/11/2024    Procedure: Colonoscopy with CO2 insufflation;  Surgeon: Lidia Valente MD;  Location: [facility] OR    EYE SURGERY  2013    Minor removal of corneal deposit, twice, in right eye       ALLERGIES:     Allergies   Allergen Reactions    Lactose        PERTINENT MEDICATIONS:    Current Outpatient Medications:     bisacodyl (DULCOLAX) 5 MG EC tablet, Take 2 tablets at 3 pm the day before your procedure. If your  procedure is before 11 am, take 2 additional tablets at 11 pm. If your procedure is after 11 am, take 2 additional tablets at 6 am. For additional instructions refer to your colonoscopy prep instructions., Disp: 4 tablet, Rfl: 0    cholecalciferol (VITAMIN D3) 25 mcg (1000 units) capsule, Take 1 capsule by mouth daily, Disp: , Rfl:     Cyanocobalamin (B-12) 1000 MCG CAPS, Take 1,000 mcg by mouth daily, Disp: , Rfl:     multivitamin w/minerals (MULTI-VITAMIN) tablet, Take 1 tablet by mouth daily, Disp: , Rfl:     norethindrone (AYGESTIN) 5 MG tablet, Take 1 tablet (5 mg) by mouth daily., Disp: 90 tablet, Rfl: 0    norethindrone-ethinyl estradiol-iron (JUNEL FE 1.5/30) 1.5-30 MG-MCG tablet, TAKE 1 TABLET BY MOUTH DAILY. SKIP PLACEBO EVERY OTHER MONTH, Disp: 84 tablet, Rfl: 3    omega 3 1000 MG CAPS, , Disp: , Rfl:     polyethylene glycol (GOLYTELY) 236 g suspension, The night before the exam at 6 pm drink an 8-ounce glass every 15 minutes until the jug is half empty. If you arrive before 11 AM: Drink the other half of the Golytely jug at 11 PM night before procedure. If you arrive after 11 AM: Drink the other half of the Golytely jug at 6 AM day of procedure. For additional instructions refer to your colonoscopy prep instructions., Disp: 4000 mL, Rfl: 0    SOCIAL HISTORY:  Social History     Socioeconomic History    Marital status: Single     Spouse name: Not on file    Number of children: Not on file    Years of education: Not on file    Highest education level: Not on file   Occupational History    Not on file   Tobacco Use    Smoking status: Never    Smokeless tobacco: Never   Vaping Use    Vaping status: Never Used   Substance and Sexual Activity    Alcohol use: Not Currently     Comment: Minimal    Drug use: Never    Sexual activity: Not Currently     Partners: Male     Birth control/protection: Abstinence, Pill     Comment: I would like a new birth control prescription   Other Topics Concern    Parent/sibling w/  CABG, MI or angioplasty before 65F 55M? No   Social History Narrative    Not on file     Social Drivers of Health     Financial Resource Strain: Low Risk  (3/23/2024)    Financial Resource Strain     Within the past 12 months, have you or your family members you live with been unable to get utilities (heat, electricity) when it was really needed?: No   Food Insecurity: Low Risk  (3/23/2024)    Food Insecurity     Within the past 12 months, did you worry that your food would run out before you got money to buy more?: No     Within the past 12 months, did the food you bought just not last and you didn t have money to get more?: No   Transportation Needs: Low Risk  (3/23/2024)    Transportation Needs     Within the past 12 months, has lack of transportation kept you from medical appointments, getting your medicines, non-medical meetings or appointments, work, or from getting things that you need?: No   Physical Activity: Sufficiently Active (3/23/2024)    Exercise Vital Sign     Days of Exercise per Week: 6 days     Minutes of Exercise per Session: 60 min   Stress: Stress Concern Present (3/23/2024)    [employer] - Occupational Stress Questionnaire     Feeling of Stress : To some extent   Social Connections: Unknown (3/23/2024)    Social Connection and Isolation Panel [NHANES]     Frequency of Communication with Friends and Family: Not on file     Frequency of Social Gatherings with Friends and Family: Once a week     Attends Cheondoism Services: Not on file     Active Member of Clubs or Organizations: Not on file     Attends Club or Organization Meetings: Not on file     Marital Status: Not on file   Interpersonal Safety: Unknown (12/11/2024)    Interpersonal Safety     Do you feel physically and emotionally safe where you currently live?: Patient unable to answer     Within the past 12 months, have you been hit, slapped, kicked or otherwise physically hurt by someone?: Patient unable to  "answer     Within the past 12 months, have you been humiliated or emotionally abused in other ways by your partner or ex-partner?: Patient unable to answer   Housing Stability: Low Risk  (3/23/2024)    Housing Stability     Do you have housing? : Yes     Are you worried about losing your housing?: No       FAMILY HISTORY:  Family History   Problem Relation Age of Onset    Diabetes Father     Hyperlipidemia Father     GERD Father         Heartburn    Obesity Father     Depression Maternal Grandmother     Colon Cancer Paternal Grandfather     Skin Cancer Paternal Aunt     Depression Brother     Anxiety Disorder Brother     Genetic Disorder Brother         Tested positive for HLA-B27. Doctors suspect  Ankylosing Spondylitis, or something similar       Past/family/social history reviewed and no changes    PHYSICAL EXAMINATION:  Vitals reviewed: Ht 1.6 m (5' 3\")   Wt 72.6 kg (160 lb)   BMI 28.34 kg/m    Video physical exam  General: Patient appears well in no acute distress.   Skin: No visualized rash or lesions on visualized skin  Eyes: EOMI, no erythema, sclera icterus or discharge noted  Resp: Appears to be breathing comfortably without accessory muscle usage, speaking in full sentences, no cough  MSK: Appears to have normal range of motion based on visualized movements  Neurologic: No apparent tremors, facial movements symmetric  Psych: affect normal, alert and oriented      PERTINENT STUDIES:    Office Visit on 11/18/2024   Component Date Value Ref Range Status    Campylobacter species 11/19/2024 Negative  Negative Final    Salmonella species 11/19/2024 Negative  Negative Final    Vibrio species 11/19/2024 Negative  Negative Final    Vibrio cholerae 11/19/2024 Negative  Negative Final    Yersinia enterocolitica 11/19/2024 Negative  Negative Final    Enteropathogenic E. coli (EPEC) 11/19/2024 Negative  Negative, NA Final    Shiga-like toxin-producing E. coli* 11/19/2024 Negative  Negative Final    " Shigella/Enteroinvasive E. coli (E* 11/19/2024 Negative  Negative Final    Cryptosporidium species 11/19/2024 Negative  Negative Final    Giardia lamblia 11/19/2024 Negative  Negative Final    Norovirus Gl/Gll 11/19/2024 Positive (A)  Negative Final    Rotavirus A 11/19/2024 Negative  Negative Final    Plesiomonas shigelloides 11/19/2024 Negative  Negative Final    Enteroaggregative E. coli (EAEC) 11/19/2024 Negative  Negative Final    Enterotoxigenic E. coli (ETEC) 11/19/2024 Negative  Negative Final    E. coli O157 11/19/2024 NA  Negative, NA Final    Cyclospora cayetanensis 11/19/2024 Negative  Negative Final    Entamoeba histolytica 11/19/2024 Negative  Negative Final    Adenovirus F40/41 11/19/2024 Negative  Negative Final    Astrovirus 11/19/2024 Negative  Negative Final    Sapovirus 11/19/2024 Negative  Negative Final    CRP Inflammation 11/18/2024 3.26  <5.00 mg/L Final    Tissue Transglutaminase Antibody I* 11/18/2024 0.6  <7.0 U/mL Final    Tissue Transglutaminase Antibody I* 11/18/2024 <0.6  <7.0 U/mL Final    TSH 11/18/2024 2.72  0.30 - 4.20 uIU/mL Final    Ferritin 11/18/2024 31  6 - 175 ng/mL Final    Vitamin D, Total (25-Hydroxy) 11/18/2024 19 (L)  20 - 50 ng/mL Final    Sodium 11/18/2024 142  135 - 145 mmol/L Final    Potassium 11/18/2024 4.4  3.4 - 5.3 mmol/L Final    Carbon Dioxide (CO2) 11/18/2024 23  22 - 29 mmol/L Final    Anion Gap 11/18/2024 12  7 - 15 mmol/L Final    Urea Nitrogen 11/18/2024 12.0  6.0 - 20.0 mg/dL Final    Creatinine 11/18/2024 0.80  0.51 - 0.95 mg/dL Final    GFR Estimate 11/18/2024 >90  >60 mL/min/1.73m2 Final    Calcium 11/18/2024 9.4  8.8 - 10.4 mg/dL Final    Chloride 11/18/2024 107  98 - 107 mmol/L Final    Glucose 11/18/2024 90  70 - 99 mg/dL Final    Alkaline Phosphatase 11/18/2024 55  40 - 150 U/L Final    AST 11/18/2024 24  0 - 45 U/L Final    ALT 11/18/2024 22  0 - 50 U/L Final    Protein Total 11/18/2024 7.4  6.4 - 8.3 g/dL Final    Albumin 11/18/2024 4.6  3.5 -  5.2 g/dL Final    Bilirubin Total 11/18/2024 0.2  <=1.2 mg/dL Final    Lyme Disease Antibodies Total 11/18/2024 0.13  <0.90 Final    Lipase 11/18/2024 44  13 - 60 U/L Final    Helicobacter pylori Antigen Stool 11/19/2024 Negative  Negative Final    WBC Count 11/18/2024 6.6  4.0 - 11.0 10e3/uL Final    RBC Count 11/18/2024 4.49  3.80 - 5.20 10e6/uL Final    Hemoglobin 11/18/2024 14.3  11.7 - 15.7 g/dL Final    Hematocrit 11/18/2024 42.7  35.0 - 47.0 % Final    MCV 11/18/2024 95  78 - 100 fL Final    MCH 11/18/2024 31.8  26.5 - 33.0 pg Final    MCHC 11/18/2024 33.5  31.5 - 36.5 g/dL Final    RDW 11/18/2024 11.9  10.0 - 15.0 % Final    Platelet Count 11/18/2024 283  150 - 450 10e3/uL Final    % Neutrophils 11/18/2024 54  % Final    % Lymphocytes 11/18/2024 40  % Final    % Monocytes 11/18/2024 6  % Final    % Eosinophils 11/18/2024 0  % Final    % Basophils 11/18/2024 0  % Final    % Immature Granulocytes 11/18/2024 0  % Final    Absolute Neutrophils 11/18/2024 3.6  1.6 - 8.3 10e3/uL Final    Absolute Lymphocytes 11/18/2024 2.6  0.8 - 5.3 10e3/uL Final    Absolute Monocytes 11/18/2024 0.4  0.0 - 1.3 10e3/uL Final    Absolute Eosinophils 11/18/2024 0.0  0.0 - 0.7 10e3/uL Final    Absolute Basophils 11/18/2024 0.0  0.0 - 0.2 10e3/uL Final    Absolute Immature Granulocytes 11/18/2024 0.0  <=0.4 10e3/uL Final        Lab Results   Component Value Date    WBC 6.6 11/18/2024    WBC 8.7 03/27/2024    WBC 7.9 03/28/2016    HGB 14.3 11/18/2024    HGB 13.8 03/27/2024    HGB 13.0 03/28/2016     11/18/2024     03/27/2024     03/28/2016    CHOL 163 03/08/2023    TRIG 138 03/08/2023    HDL 43 (L) 03/08/2023    ALT 22 11/18/2024    ALT 13 05/12/2015    AST 24 11/18/2024    AST 14 05/12/2015     11/18/2024    BUN 12.0 11/18/2024    CO2 23 11/18/2024    TSH 2.72 11/18/2024    TSH 2.88 03/28/2016        Liver Function Studies -   Recent Labs   Lab Test 11/18/24  1705   PROTTOTAL 7.4   ALBUMIN 4.6   BILITOTAL  0.2   ALKPHOS 55   AST 24   ALT 22        PREVIOUS ENDOSCOPY    Results for orders placed or performed during the hospital encounter of 12/11/24   COLONOSCOPY    Collection Time: 12/11/24  9:57 AM   Result Value Ref Range    COLONOSCOPY       [facility]  Endoscopy Department-[place]  _______________________________________________________________________________  Patient Name: Stacey Earlville           Procedure Date: 12/11/2024 9:57 AM  MRN: [id number]                       YOB: 1990  Admit Type: Outpatient                Age: 34  Gender: Female                        Note Status: Finalized  Attending MD: LINUS BACON MD,   Instrument Name: [id number]  _______________________________________________________________________________     Procedure:                Colonoscopy  Indications:              Clinically significant diarrhea of unexplained                             origin  Providers:                LINUS BACON MD  Referring MD:             ARABELLA SHEEHAN  Medicines:                Midazolam 2 mg IV, Fentanyl 100 micrograms IV  Complications:            No immediate complications.  _____________________________________________________ __________________________  Procedure:                Pre-Anesthesia Assessment:                            - Prior to the procedure, a History and Physical                             was performed, and patient medications and                             allergies were reviewed. The patient is competent.                             The risks and benefits of the procedure and the                             sedation options and risks were discussed with the                             patient. All questions were answered and informed                             consent was obtained. Patient identification and                             proposed procedure were verified by the physician                              in the pre-procedure area. Mental Status                             Examination: alert and oriented. Airway                             Examination: normal oropharyngeal airway and neck                             mobility. Respiratory Examination: clear to                              auscultation. CV Examination: normal. Prophylactic                             Antibiotics: The patient does not require                             prophylactic antibiotics. Prior Anticoagulants: The                             patient has taken no anticoagulant or antiplatelet                             agents. ASA Grade Assessment: I - A normal, healthy                             patient. After reviewing the risks and benefits,                             the patient was deemed in satisfactory condition to                             undergo the procedure. The anesthesia plan was to                             use moderate sedation / analgesia (conscious                             sedation). Immediately prior to administration of                             medications, the patient was re-assessed for                             adequacy to receive sedatives. The heart rate,                             respiratory rate, oxygen saturations, blood                              pressure, adequacy of pulmonary ventilation, and                             response to care were monitored throughout the                             procedure. The physical status of the patient was                             re-assessed after the procedure.                            After obtaining informed consent, the colonoscope                             was passed under direct vision. Throughout the                             procedure, the patient's blood pressure, pulse, and                             oxygen saturations were monitored continuously. The                             was introduced through the anus and advanced  to the                             cecum, identified by appendiceal orifice and                             ileocecal valve. The colonoscopy was performed                             without difficulty. The patient tolerated the                             procedure well. The quality of the bowel                             preparation was excell ent. The terminal ileum,                             ileocecal valve, appendiceal orifice, and rectum                             were photographed. Scope withdrawal time was 10                             minutes.                                                                                   Findings:       The perianal and digital rectal examinations were normal.       The colon (entire examined portion) appeared normal. Biopsies for        histology were taken with a cold forceps from the entire colon for        evaluation of microscopic colitis.       The terminal ileum appeared normal.       The entire examined colon appeared normal on direct and retroflexion        views.                                                                                   Moderate Sedation:       Moderate (conscious) sedation was administered by the nurse and        supervised by the endoscopist. The following parameters were monitored:        oxygen saturation, heart rate, blood pressure, and re sponse to care.        Total physician intraservice time was 17 minutes.  Impression:               - The entire examined colon is normal. Biopsied.                            - The examined portion of the ileum was normal.                            - The entire examined colon is normal on direct and                             retroflexion views.  Recommendation:           - Discharge patient to home.                            - Resume previous diet.                            - Await pathology results.                            - Repeat colonoscopy in 10 years for screening                              purposes.                            - Patient has a contact number available for                             emergencies. The signs and symptoms of potential                             delayed complications were discussed with the                             patient. Return to normal activities tomorrow.                             Written discharge instructions were provided to  e                             patient.                                                                                     _______________________  LINUS BACON MD  12/11/2024 10:30:43 AM  I was physically present for the entire viewing portion of the exam.LINUS BACON MD  Number of Addenda: 0    Note Initiated On: 12/11/2024 9:57 AM  Scope In:  Scope Out:       Final Diagnosis   A.  COLON, RANDOM BIOPSIES:  -Colonic mucosa with no significant histologic abnormality  -Negative for active inflammation and lymphocytic colitis

## 2025-02-20 NOTE — LETTER
2/20/2025      Stacey Zavala  [address] [address]      Dear Colleague,    Thank you for referring your patient, Stacey Zavala, to the [facility] [place]. Please see a copy of my visit note below.    Virtual Visit Details    Type of service:  Video Visit     Originating Location (pt. Location): Home    Distant Location (provider location):  On-site  Platform used for Video Visit: Oleksandr    Joined the call at invalid date.  Left the call at 2/20/2025, 8:57:53 am.  You were on the call for 23 min     GI CLINIC VISIT    ASSESSMENT/PLAN:  Stacey Zavala is a 34 year old year old female with PMHx of listed lactose intolerance, HLA B27 positive, pelvic floor dysfunction (in PT) following with the [facility] group for diarrhea/changes in stooling that started in summer 2024.     FHX significant for PGF with CRC, IBS in Aunt     #changes in stooling, predominate diarrhea  #lower abdomen pain   #pelvic pain  All her life she had trouble with constipation (upwards of no BM for 5d) but mid 2024, she shifted to a predominate diarrhea pattern that was partially responsive to a low fodmap diet. No bloody or black stools. Weight stable. She had work up at her primary to include CRP to 7s, normal CBC, CMP, TSH.  CTAP 12/2024 showed no bowel inflammation, obstruction or explanation for the change in BMs. It did show signs of chronic granulomatous disease so she is currently undergoing evaluation for this. Enteric panel was POS for Norovirus (11/2024). Ultimately a colonoscopy was done 12/2024 that was unremarkable with NEG random colon biopsies. Terminal ileum was normal. She met with General GI where fecal fat, fecal calpro, fecal elastase and celiac serologies were unremarkable. MRE did not show inflammation to small intestines. She was asked to start a daily fiber and meet with GI RD, consulted on 1/23/2025. She is vegan. Sx are thought driven by functional diarrhea acutely  worsened by Norovirus infection (2024) with ddx to include SIBO/IMO and other carb malabsorption.     - Her symptoms improved significantly in the last several months, now on daily citrucel powder; cont   - She continues with dietary measures discussed with RD.   - She will reach out to pelvic floor physical therapy for follow-up  - she is working with GYN for pelvic pain and will be seeing UroGYN    Future consideration  1.Imodium, BAS  2. EGD, if diarrhea recurs. If pursued, would recommend duodenal biopsies. Consider HLA genotyping for celiac permissive genes   3. Consider utility of VCE, though no HÉCTOR, ferritin was 31  4. Breath testing   5. Trials of holding gluten/dairy x 2 weeks each with diary  6. Bowel clean out   7. PF eval with dedicated defecrography studies    #epigastric pain  #biliary dyskinesia    RUQ US WNL. Hepatic function normal. Maunt s/p CCY   -HIDA with EF 32%. She does report some upper abdominal pain prompted by eating fatty food. She can have some nausea with it too. These episode are infrequent given her dietary modifications (1-2x/mo). We discussed this pain could be bilary dyskinesia for which the recommendation would be meeting with GS for discussion of elective CCY. As sx are infrequent, she would prefer to monitor sx for now. ER precautions reviewed.     No orders of the defined types were placed in this encounter.    Colorectal cancer screenin, unless otherwise clinically indicated     RTC - 6-12 mo or sooner PRN     Thank you for this consultation.  It was a pleasure to participate in the care of this patient; please contact me with any further questions.     Althea Springer PA-C  Follow up: As planned above. Today, I personally spent 35  minutes spent on the date of the encounter doing chart review, history and exam, documentation and further activities per the note.    HPI  Stacey Zavala is a 34 year old year old female with PMHx of listed lactose intolerance, HLA B27  "positive, pelvic floor dysfunction (in PT) following with [facility] for diarrhea/changes in stooling    12/2024 appt with me   10+ years ago, had a lot of trouble with constipation (requiring laxatives as a child; going upwards of no BM for 5+d). When she did stool, she passed a hard firm stool then a lot of diarrhea. Would spend up to 1 hour on the toilet for these stools - accompanied by have dry heaves, nausea. Then cycle repeats    She does not typically have that above bowel pattern any longer  In July 2024, she recognized a noticeable change in stooling pattern -  of having persistent diarrhea. She'd have a few days of frequent loose stools then days with better formed stools     In past 5 months, she'd Bms that look like \"piles of dirt\" and then followed by bouts of diarrhea.   -no bloody or black stools  -does see oily sheen to surface of stools  -sees occasional food particles  -notices more increased mucus in stools    Last 3 days, her stools have been normal appearing, good consistency     Some associated sx of gassiness/bloat  Appetite/weight stable  No nausea/vomiting    Went low fodmap for a bit, helped initially but not sure if its as effective now. Vegan. Needs to reintroduce foods but process is overwhelming (in general)    Recent Cscope - high quality with TI evaluation. Normal macro exam. Random colon biopsies NEG for MC, no IBD.     No known FHX of IBD, celiac disease     Pelvic pain - sharp, severe episodes with onset 2 years. Linked with Bms and a subsequent change in stool consistency (looser)  She also has overall achy abd pain , generalized    In past 6 mo, having multiple days in a week of heartburn sx. Tums helps. 11/2024 H.pylori NEG. Never been on PPI.  -having some intermittent epigastric pain, unclear onset. Mild, 1-2 episodes a week, radiates down abdomen, midline    -No dysphagia, odynphagia, trouble w/ initiation of a swallow, melena, unintentional weight loss, " nausea/vomiting    Undergoing evaluation for possible chronic granulomatous disease - CTAP with finding of multiple calcified splenic granulomas. Appt with immunology to be set.     Seen with rheum (7/2024) for chronic back pain and coccygeal pain (both thought mechanical). MR SI joint without sacroiliitis or enthesitis.     Seen with GYN (8/2024) for ongoing pelvic pain thought MSK vs PF dysfunction, possible endometriosis, IBS. Started on aygestin OCP and sent to PT    Seen with PT for pelvic pain/dysfunction and still in biofeedback therapy    2/20/2025  In last 2 months, shares her diarrhea nearly stopped. She had a few times of looser formed stool but otherwise she has been passing formed stools which she is pleased to report  BM - an occasional skipped day but otherwise regular pattern; usually 1 well formed good volumed stool daily   -good evaluation  -no straining  -not on toilet for a long time  -no bloody or black stools   -no longer seeing oily sheen to surface of stool  On citrucel - 1 tbsp a day -- she feels it is helpful     Abdominal pain - upper abd pain experience 1-2 times a month after she eats fatty food.  Can be associated with nausea.  She feels these episodes are mild and manageable as she moderates her diet.   Sharp pelvic pain around bladder, intermittent. Can be crampy sensation too.    Seen with GYN for this - empiric trial of med for potential endometriosis and she feels it may be helping with return follow up in a few months   -they are considering referral to Uro/Gyn   -she had seen pelvic floor PT for pelvic pain, dyspareunia and completed biofeedback therapy   Seen with Sheron Rodrigez RD - helpful   Some associated sx of gassiness/bloat but shares that it is improved as well. No nausea/vomiting  Appetite/weight stable  No other questions or concerns    Family Hx  Paternal Grandfather - CRC w/ colostomy bag, dx in his 60s   Paternal Aunt - IBS-D   Maternal Aunt - s/p CCY   Dad -  diverticular disease, acid reflux   Brother - ankylosing spondylitis     No other known family history or GI related malignancy (esophageal, gastric, pancreatic, liver or colon) or family history of IBD/celiac disease.     Social Hx   Occassional use of NSAIDs or Tylenol now. Prolonged ibuprofen use in 2020    No ETOH  Never tobacco products   No recreational drug use     Vegan     PROBLEM LIST  Patient Active Problem List    Diagnosis Date Noted     Pelvic pain in female 09/17/2024     Priority: Medium     Sacral pain 05/03/2024     Priority: Medium     HLA B27 (HLA B27 positive) 04/05/2024     Priority: Medium     Family history of ankylosing spondylitis 03/08/2023     Priority: Medium     Migraine without aura and without status migrainosus, not intractable 03/08/2023     Priority: Medium     Septate uterus 04/14/2016     Priority: Medium     Papanicolaou smear of cervix with low grade squamous intraepithelial lesion (LGSIL) 04/01/2015     Priority: Medium     Hx of LSIL Pap  4/2015: colp - EUGENE I (outside records, recommended pap in 6 months)  10/22/15: pap - NIL . Plan pap in 6 months.   4/14/16: Pap - NIL. Plan Pap in 1 year per provider.  06/04/18 Patient is lost to pap tracking follow-up.  06/26/19: NIL Pap. Plan cotest in 3 years.    3/8/23 NIL Pap, Neg HR HPV. Plan: Routine screening.          PERTINENT PAST MEDICAL HISTORY:  Past Medical History:   Diagnosis Date     Papanicolaou smear of cervix with low grade squamous intraepithelial lesion (LGSIL) 04/2015    colp - EUGENE I       PREVIOUS SURGERIES:  Past Surgical History:   Procedure Laterality Date     BIOPSY  April 2015    Cervix     COLONOSCOPY N/A 12/11/2024    Procedure: COLONOSCOPY, WITH POLYPECTOMY AND BIOPSY;  Surgeon: Lidia Valente MD;  Location: [facility] OR     COLONOSCOPY WITH CO2 INSUFFLATION N/A 12/11/2024    Procedure: Colonoscopy with CO2 insufflation;  Surgeon: Lidia Valente MD;  Location: [facility] OR     EYE SURGERY  2013    Minor  removal of corneal deposit, twice, in right eye       ALLERGIES:     Allergies   Allergen Reactions     Lactose        PERTINENT MEDICATIONS:    Current Outpatient Medications:      bisacodyl (DULCOLAX) 5 MG EC tablet, Take 2 tablets at 3 pm the day before your procedure. If your procedure is before 11 am, take 2 additional tablets at 11 pm. If your procedure is after 11 am, take 2 additional tablets at 6 am. For additional instructions refer to your colonoscopy prep instructions., Disp: 4 tablet, Rfl: 0     cholecalciferol (VITAMIN D3) 25 mcg (1000 units) capsule, Take 1 capsule by mouth daily, Disp: , Rfl:      Cyanocobalamin (B-12) 1000 MCG CAPS, Take 1,000 mcg by mouth daily, Disp: , Rfl:      multivitamin w/minerals (MULTI-VITAMIN) tablet, Take 1 tablet by mouth daily, Disp: , Rfl:      norethindrone (AYGESTIN) 5 MG tablet, Take 1 tablet (5 mg) by mouth daily., Disp: 90 tablet, Rfl: 0     norethindrone-ethinyl estradiol-iron (JUNEL FE 1.5/30) 1.5-30 MG-MCG tablet, TAKE 1 TABLET BY MOUTH DAILY. SKIP PLACEBO EVERY OTHER MONTH, Disp: 84 tablet, Rfl: 3     omega 3 1000 MG CAPS, , Disp: , Rfl:      polyethylene glycol (GOLYTELY) 236 g suspension, The night before the exam at 6 pm drink an 8-ounce glass every 15 minutes until the jug is half empty. If you arrive before 11 AM: Drink the other half of the Golytely jug at 11 PM night before procedure. If you arrive after 11 AM: Drink the other half of the Golytely jug at 6 AM day of procedure. For additional instructions refer to your colonoscopy prep instructions., Disp: 4000 mL, Rfl: 0    SOCIAL HISTORY:  Social History     Socioeconomic History     Marital status: Single     Spouse name: Not on file     Number of children: Not on file     Years of education: Not on file     Highest education level: Not on file   Occupational History     Not on file   Tobacco Use     Smoking status: Never     Smokeless tobacco: Never   Vaping Use     Vaping status: Never Used    Substance and Sexual Activity     Alcohol use: Not Currently     Comment: Minimal     Drug use: Never     Sexual activity: Not Currently     Partners: Male     Birth control/protection: Abstinence, Pill     Comment: I would like a new birth control prescription   Other Topics Concern     Parent/sibling w/ CABG, MI or angioplasty before 65F 55M? No   Social History Narrative     Not on file     Social Drivers of Health     Financial Resource Strain: Low Risk  (3/23/2024)    Financial Resource Strain      Within the past 12 months, have you or your family members you live with been unable to get utilities (heat, electricity) when it was really needed?: No   Food Insecurity: Low Risk  (3/23/2024)    Food Insecurity      Within the past 12 months, did you worry that your food would run out before you got money to buy more?: No      Within the past 12 months, did the food you bought just not last and you didn t have money to get more?: No   Transportation Needs: Low Risk  (3/23/2024)    Transportation Needs      Within the past 12 months, has lack of transportation kept you from medical appointments, getting your medicines, non-medical meetings or appointments, work, or from getting things that you need?: No   Physical Activity: Sufficiently Active (3/23/2024)    Exercise Vital Sign      Days of Exercise per Week: 6 days      Minutes of Exercise per Session: 60 min   Stress: Stress Concern Present (3/23/2024)    [employer] [place] [employer] - Occupational Stress Questionnaire      Feeling of Stress : To some extent   Social Connections: Unknown (3/23/2024)    Social Connection and Isolation Panel [NHANES]      Frequency of Communication with Friends and Family: Not on file      Frequency of Social Gatherings with Friends and Family: Once a week      Attends Bahai Services: Not on file      Active Member of Clubs or Organizations: Not on file      Attends Club or Organization Meetings: Not on file       "Marital Status: Not on file   Interpersonal Safety: Unknown (12/11/2024)    Interpersonal Safety      Do you feel physically and emotionally safe where you currently live?: Patient unable to answer      Within the past 12 months, have you been hit, slapped, kicked or otherwise physically hurt by someone?: Patient unable to answer      Within the past 12 months, have you been humiliated or emotionally abused in other ways by your partner or ex-partner?: Patient unable to answer   Housing Stability: Low Risk  (3/23/2024)    Housing Stability      Do you have housing? : Yes      Are you worried about losing your housing?: No       FAMILY HISTORY:  Family History   Problem Relation Age of Onset     Diabetes Father      Hyperlipidemia Father      GERD Father         Heartburn     Obesity Father      Depression Maternal Grandmother      Colon Cancer Paternal Grandfather      Skin Cancer Paternal Aunt      Depression Brother      Anxiety Disorder Brother      Genetic Disorder Brother         Tested positive for HLA-B27. Doctors suspect  Ankylosing Spondylitis, or something similar       Past/family/social history reviewed and no changes    PHYSICAL EXAMINATION:  Vitals reviewed: Ht 1.6 m (5' 3\")   Wt 72.6 kg (160 lb)   BMI 28.34 kg/m    Video physical exam  General: Patient appears well in no acute distress.   Skin: No visualized rash or lesions on visualized skin  Eyes: EOMI, no erythema, sclera icterus or discharge noted  Resp: Appears to be breathing comfortably without accessory muscle usage, speaking in full sentences, no cough  MSK: Appears to have normal range of motion based on visualized movements  Neurologic: No apparent tremors, facial movements symmetric  Psych: affect normal, alert and oriented      PERTINENT STUDIES:    Office Visit on 11/18/2024   Component Date Value Ref Range Status     Campylobacter species 11/19/2024 Negative  Negative Final     Salmonella species 11/19/2024 Negative  Negative Final "     Vibrio species 11/19/2024 Negative  Negative Final     Vibrio cholerae 11/19/2024 Negative  Negative Final     Yersinia enterocolitica 11/19/2024 Negative  Negative Final     Enteropathogenic E. coli (EPEC) 11/19/2024 Negative  Negative, NA Final     Shiga-like toxin-producing E. coli* 11/19/2024 Negative  Negative Final     Shigella/Enteroinvasive E. coli (E* 11/19/2024 Negative  Negative Final     Cryptosporidium species 11/19/2024 Negative  Negative Final     Giardia lamblia 11/19/2024 Negative  Negative Final     Norovirus Gl/Gll 11/19/2024 Positive (A)  Negative Final     Rotavirus A 11/19/2024 Negative  Negative Final     Plesiomonas shigelloides 11/19/2024 Negative  Negative Final     Enteroaggregative E. coli (EAEC) 11/19/2024 Negative  Negative Final     Enterotoxigenic E. coli (ETEC) 11/19/2024 Negative  Negative Final     E. coli O157 11/19/2024 NA  Negative, NA Final     Cyclospora cayetanensis 11/19/2024 Negative  Negative Final     Entamoeba histolytica 11/19/2024 Negative  Negative Final     Adenovirus F40/41 11/19/2024 Negative  Negative Final     Astrovirus 11/19/2024 Negative  Negative Final     Sapovirus 11/19/2024 Negative  Negative Final     CRP Inflammation 11/18/2024 3.26  <5.00 mg/L Final     Tissue Transglutaminase Antibody I* 11/18/2024 0.6  <7.0 U/mL Final     Tissue Transglutaminase Antibody I* 11/18/2024 <0.6  <7.0 U/mL Final     TSH 11/18/2024 2.72  0.30 - 4.20 uIU/mL Final     Ferritin 11/18/2024 31  6 - 175 ng/mL Final     Vitamin D, Total (25-Hydroxy) 11/18/2024 19 (L)  20 - 50 ng/mL Final     Sodium 11/18/2024 142  135 - 145 mmol/L Final     Potassium 11/18/2024 4.4  3.4 - 5.3 mmol/L Final     Carbon Dioxide (CO2) 11/18/2024 23  22 - 29 mmol/L Final     Anion Gap 11/18/2024 12  7 - 15 mmol/L Final     Urea Nitrogen 11/18/2024 12.0  6.0 - 20.0 mg/dL Final     Creatinine 11/18/2024 0.80  0.51 - 0.95 mg/dL Final     GFR Estimate 11/18/2024 >90  >60 mL/min/1.73m2 Final     Calcium  11/18/2024 9.4  8.8 - 10.4 mg/dL Final     Chloride 11/18/2024 107  98 - 107 mmol/L Final     Glucose 11/18/2024 90  70 - 99 mg/dL Final     Alkaline Phosphatase 11/18/2024 55  40 - 150 U/L Final     AST 11/18/2024 24  0 - 45 U/L Final     ALT 11/18/2024 22  0 - 50 U/L Final     Protein Total 11/18/2024 7.4  6.4 - 8.3 g/dL Final     Albumin 11/18/2024 4.6  3.5 - 5.2 g/dL Final     Bilirubin Total 11/18/2024 0.2  <=1.2 mg/dL Final     Lyme Disease Antibodies Total 11/18/2024 0.13  <0.90 Final     Lipase 11/18/2024 44  13 - 60 U/L Final     Helicobacter pylori Antigen Stool 11/19/2024 Negative  Negative Final     WBC Count 11/18/2024 6.6  4.0 - 11.0 10e3/uL Final     RBC Count 11/18/2024 4.49  3.80 - 5.20 10e6/uL Final     Hemoglobin 11/18/2024 14.3  11.7 - 15.7 g/dL Final     Hematocrit 11/18/2024 42.7  35.0 - 47.0 % Final     MCV 11/18/2024 95  78 - 100 fL Final     MCH 11/18/2024 31.8  26.5 - 33.0 pg Final     MCHC 11/18/2024 33.5  31.5 - 36.5 g/dL Final     RDW 11/18/2024 11.9  10.0 - 15.0 % Final     Platelet Count 11/18/2024 283  150 - 450 10e3/uL Final     % Neutrophils 11/18/2024 54  % Final     % Lymphocytes 11/18/2024 40  % Final     % Monocytes 11/18/2024 6  % Final     % Eosinophils 11/18/2024 0  % Final     % Basophils 11/18/2024 0  % Final     % Immature Granulocytes 11/18/2024 0  % Final     Absolute Neutrophils 11/18/2024 3.6  1.6 - 8.3 10e3/uL Final     Absolute Lymphocytes 11/18/2024 2.6  0.8 - 5.3 10e3/uL Final     Absolute Monocytes 11/18/2024 0.4  0.0 - 1.3 10e3/uL Final     Absolute Eosinophils 11/18/2024 0.0  0.0 - 0.7 10e3/uL Final     Absolute Basophils 11/18/2024 0.0  0.0 - 0.2 10e3/uL Final     Absolute Immature Granulocytes 11/18/2024 0.0  <=0.4 10e3/uL Final        Lab Results   Component Value Date    WBC 6.6 11/18/2024    WBC 8.7 03/27/2024    WBC 7.9 03/28/2016    HGB 14.3 11/18/2024    HGB 13.8 03/27/2024    HGB 13.0 03/28/2016     11/18/2024     03/27/2024      03/28/2016    CHOL 163 03/08/2023    TRIG 138 03/08/2023    HDL 43 (L) 03/08/2023    ALT 22 11/18/2024    ALT 13 05/12/2015    AST 24 11/18/2024    AST 14 05/12/2015     11/18/2024    BUN 12.0 11/18/2024    CO2 23 11/18/2024    TSH 2.72 11/18/2024    TSH 2.88 03/28/2016        Liver Function Studies -   Recent Labs   Lab Test 11/18/24  1705   PROTTOTAL 7.4   ALBUMIN 4.6   BILITOTAL 0.2   ALKPHOS 55   AST 24   ALT 22        PREVIOUS ENDOSCOPY    Results for orders placed or performed during the hospital encounter of 12/11/24   COLONOSCOPY    Collection Time: 12/11/24  9:57 AM   Result Value Ref Range    COLONOSCOPY       [facility]  Endoscopy Department-[place]  _______________________________________________________________________________  Patient Name: Stacey Lucas           Procedure Date: 12/11/2024 9:57 AM  MRN: [id number]                       YOB: 1990  Admit Type: Outpatient                Age: 34  Gender: Female                        Note Status: Finalized  Attending MD: LINUS BACON MD,   Instrument Name: [id number]  _______________________________________________________________________________     Procedure:                Colonoscopy  Indications:              Clinically significant diarrhea of unexplained                             origin  Providers:                LINUS BACON MD  Referring MD:             ARABELLA SHEEHAN  Medicines:                Midazolam 2 mg IV, Fentanyl 100 micrograms IV  Complications:            No immediate complications.  _____________________________________________________ __________________________  Procedure:                Pre-Anesthesia Assessment:                            - Prior to the procedure, a History and Physical                             was performed, and patient medications and                             allergies were reviewed. The patient is competent.                              The risks and benefits of the procedure and the                             sedation options and risks were discussed with the                             patient. All questions were answered and informed                             consent was obtained. Patient identification and                             proposed procedure were verified by the physician                             in the pre-procedure area. Mental Status                             Examination: alert and oriented. Airway                             Examination: normal oropharyngeal airway and neck                             mobility. Respiratory Examination: clear to                              auscultation. CV Examination: normal. Prophylactic                             Antibiotics: The patient does not require                             prophylactic antibiotics. Prior Anticoagulants: The                             patient has taken no anticoagulant or antiplatelet                             agents. ASA Grade Assessment: I - A normal, healthy                             patient. After reviewing the risks and benefits,                             the patient was deemed in satisfactory condition to                             undergo the procedure. The anesthesia plan was to                             use moderate sedation / analgesia (conscious                             sedation). Immediately prior to administration of                             medications, the patient was re-assessed for                             adequacy to receive sedatives. The heart rate,                             respiratory rate, oxygen saturations, blood                              pressure, adequacy of pulmonary ventilation, and                             response to care were monitored throughout the                             procedure. The physical status of the patient was                             re-assessed after the  procedure.                            After obtaining informed consent, the colonoscope                             was passed under direct vision. Throughout the                             procedure, the patient's blood pressure, pulse, and                             oxygen saturations were monitored continuously. The                             was introduced through the anus and advanced to the                             cecum, identified by appendiceal orifice and                             ileocecal valve. The colonoscopy was performed                             without difficulty. The patient tolerated the                             procedure well. The quality of the bowel                             preparation was excell ent. The terminal ileum,                             ileocecal valve, appendiceal orifice, and rectum                             were photographed. Scope withdrawal time was 10                             minutes.                                                                                   Findings:       The perianal and digital rectal examinations were normal.       The colon (entire examined portion) appeared normal. Biopsies for        histology were taken with a cold forceps from the entire colon for        evaluation of microscopic colitis.       The terminal ileum appeared normal.       The entire examined colon appeared normal on direct and retroflexion        views.                                                                                   Moderate Sedation:       Moderate (conscious) sedation was administered by the nurse and        supervised by the endoscopist. The following parameters were monitored:        oxygen saturation, heart rate, blood pressure, and re sponse to care.        Total physician intraservice time was 17 minutes.  Impression:               - The entire examined colon is normal. Biopsied.                            - The examined portion of  the ileum was normal.                            - The entire examined colon is normal on direct and                             retroflexion views.  Recommendation:           - Discharge patient to home.                            - Resume previous diet.                            - Await pathology results.                            - Repeat colonoscopy in 10 years for screening                             purposes.                            - Patient has a contact number available for                             emergencies. The signs and symptoms of potential                             delayed complications were discussed with the                             patient. Return to normal activities tomorrow.                             Written discharge instructions were provided to  e                             patient.                                                                                     _______________________  LINUS BACON MD  12/11/2024 10:30:43 AM  I was physically present for the entire viewing portion of the exam.LINUS BACON MD  Number of Addenda: 0    Note Initiated On: 12/11/2024 9:57 AM  Scope In:  Scope Out:       Final Diagnosis   A.  COLON, RANDOM BIOPSIES:  -Colonic mucosa with no significant histologic abnormality  -Negative for active inflammation and lymphocytic colitis         Again, thank you for allowing me to participate in the care of your patient.        Sincerely,        Althea Springer PA-C    Electronically signed

## 2025-02-21 NOTE — PATIENT INSTRUCTIONS
It was a pleasure taking care of you today.  I've included a brief summary of our discussion and care plan from today's visit below.  Please review this information with your primary care provider.  _______________________________________________________________________    My recommendations are summarized as follows:    I am glad you are doing so well!  Continue the Citrucel.  You can increase up to 2 tbsp a day if needed  UroGyn - [phone].  Pelvic Floor PT Melani Puga --> please reach out to her team to follow-up  We discussed that the upper abdominal pain could be coming from the gallbladder.  Let me know if the pain becomes more frequent or worse  -Go to the ER for worsening symptoms as we discussed, intractable pain, fevers, chills, inability to keep anything down, etc.    Please call our clinic or send a [employer] message to us if you have any questions or concerns. [employer] messages are answered by your nurse or doctor typically within 24 hours.  Please allow extra time on weekends and holidays    Return to GI Clinic in 6-12 months to review your progress.    _______________________________________________________________________    How do I schedule labs, imaging studies, or procedures that were ordered in clinic today?      Labs: To schedule lab appointment at the [facility], use my chart or call [phone]. If you have a [place] lab closer to home where you are regularly seen you can give them a call.      Procedures: If a colonoscopy, upper endoscopy, breath test, esophageal manometry, or pH impedence was ordered today, our endoscopy team will call you to schedule this. If you have not heard from our endoscopy team within a week, please call [phone] option 2 to schedule.      Imaging Studies: If you were scheduled for a CT scan, X-ray, MRI, ultrasound, HIDA scan, EKG or other imaging study, please call [phone] to have this scheduled.      Referral: If a referral  to another specialty was ordered, expect a phone call or follow instructions above. If you have not heard from anyone regarding your referral in a week, please call our clinic to check the status.      Who do I call with any questions after my visit?  Please be in touch if there are any further questions that arise following today's visit.  There are multiple ways to contact your gastroenterology care team.       During business hours, you may reach a Gastroenterology nurse at [phone]     To schedule or reschedule an appointment, please call [phone].      You can always send a secure message through [employer].  [employer] messages are answered by your nurse or doctor typically within 24 hours.  Please allow extra time on weekends and holidays.       For urgent/emergent questions after business hours, you may reach the on-call GI Fellow by contacting the [facility]  at [phone].     How will I get the results of any tests ordered?    You will receive all of your results.  If you have signed up for [employer], any tests ordered at your visit will be available to you after your physician reviews them.  Typically this takes 1-2 weeks.  If there are urgent results that require a change in your care plan, your physician or nurse will call you to discuss the next steps.       What is [employer]?  [employer] is a secure way for you to access all of your healthcare records from the [facility].  It is a web based computer program, so you can sign on to it from any location.  It also allows you to send secure messages to your care team.  I recommend signing up for [employer] access if you have not already done so and are comfortable with using a computer.       How to I schedule a follow-up visit?  If you did not schedule a follow-up visit today, please call [phone] to schedule a follow-up office visit.      Sincerely,    Althea Springer PA-C  Gastroenterology

## 2025-02-25 ENCOUNTER — TELEPHONE (OUTPATIENT)
Dept: GASTROENTEROLOGY | Facility: CLINIC | Age: 35
End: 2025-02-25
Payer: COMMERCIAL

## 2025-02-25 LAB
SCANNED LAB RESULT: NORMAL
TEST NAME: NORMAL

## 2025-02-25 NOTE — TELEPHONE ENCOUNTER
Left Voicemail (1st Attempt) and Sent Mychart (1st Attempt) for the patient to call back and schedule the following:    Appointment type: Return  Provider: BRYANT Crowe  Return date: TBD  Specialty phone number: [phone]  Additional appointment(s) needed:   Additonal Notes:     Follow Up in August 2025 (or 6-12wks) - per provider    2/25 AA

## 2025-02-26 ENCOUNTER — TELEPHONE (OUTPATIENT)
Dept: CONSULT | Facility: CLINIC | Age: 35
End: 2025-02-26
Payer: COMMERCIAL

## 2025-02-26 NOTE — TELEPHONE ENCOUNTER
Today, I reached out to Stacey to discuss the results of her recent genetic testing. To review in brief, I met with Stacey on 02/11/2025 at the request of Dianne Ingram DO to review possible genetic contributions to her features concerning for chronic granulomatous disease. A five gene analysis was completed at [facility]. These results were negative, or normal. Genes analyzed included CYBC1 (aka Q27mgm62), CYBA, CYBB, NCF2, and NCF4.         No disease-causing or uncertain changes were identified in the five genes analyzed. It is important to note that when a variant of uncertain significance is identified that is associated with an autosomal recessive condition (or conditions where two genetic changes needed to cause health concerns), these may not be reported by the lab.     However, it is still possible that her features are due to a genetic factor not analyzed by this particular test or not yet known to the genetics community. For one, this lab did not offer analysis of NCF1, a gene implicated around 20% of cases of chronic granulomatous disease. For this reason, I'd like to offer a second test at another laboratory to capture this gene.     If she elects to move forward with testing for this final gene, we would need to utilize an external laboratory. We can utilize [facility] (faster, would allow cheek swab sample, but unable to give an insurance prior authorization) or the [facility] at [facility] (slower due to requirement of prior authorization).     Additionally, Stacey should continue to follow up with her care team and may consider expanded genetic testing in the future if more applicable genes are identified. Stacey is encouraged to reach out to me if questions come up. I will send a mailed or myChart copy of the report to Stacey for her own personal record-keeping.    Rema Chavez MS, [facility]  Genetic Counselor - [facility]  Phone: [phone]  Email: [email or web address]

## 2025-03-17 ENCOUNTER — TELEPHONE (OUTPATIENT)
Dept: FAMILY MEDICINE | Facility: CLINIC | Age: 35
End: 2025-03-17
Payer: COMMERCIAL

## 2025-03-17 NOTE — TELEPHONE ENCOUNTER
Received a message from the prior auth team that Stacey's MRI that is scheduled fro tomorrow has NOT yet received an approval/denial for whether Imaging is covered. Since this is not urgent and was ordered as a 6 month follow up this can be postponed until we receive a final confirmation that insurance approves it. Can you call the patient today and tell her I advise she cancel and reschedule her imaging for 1 week so insurance can respond. Give her the number to call to schedule    Thank you,     Dianne Ingram D.O.

## 2025-03-17 NOTE — TELEPHONE ENCOUNTER
RN called patient/family and relayed provider's message. Patient/family verbalized understanding.     Saray ATKINSON RN, BSN  [facility] [place]

## 2025-03-18 ENCOUNTER — VIRTUAL VISIT (OUTPATIENT)
Dept: UROLOGY | Facility: CLINIC | Age: 35
End: 2025-03-18
Payer: COMMERCIAL

## 2025-03-18 DIAGNOSIS — F41.9 ANXIETY: ICD-10-CM

## 2025-03-18 DIAGNOSIS — R10.2 PELVIC PAIN IN FEMALE: Primary | ICD-10-CM

## 2025-03-18 PROCEDURE — 98001 SYNCH AUDIO-VIDEO NEW LOW 30: CPT | Performed by: OBSTETRICS & GYNECOLOGY

## 2025-03-18 PROCEDURE — 1126F AMNT PAIN NOTED NONE PRSNT: CPT | Mod: 95 | Performed by: OBSTETRICS & GYNECOLOGY

## 2025-03-18 ASSESSMENT — PAIN SCALES - GENERAL: PAINLEVEL_OUTOF10: NO PAIN (0)
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
## 2025-03-18 NOTE — Clinical Note
Hi team would you kindly make a mental health appointment with Dr Rosey Maher for this patient and also an in person follow up appointment with me. Thank you so much

## 2025-03-18 NOTE — NURSING NOTE
Current patient location: [address] [place] [id number]    Is the patient currently in the state of [place]? YES    Visit mode: VIDEO    If the visit is dropped, the patient can be reconnected by:VIDEO VISIT: Text to cell phone:   Telephone Information:   Mobile [phone]       Will anyone else be joining the visit? NO  (If patient encounters technical issues they should call [phone]    Are changes needed to the allergy or medication list? No    Are refills needed on medications prescribed by this physician? NO    Rooming Documentation:  Questionnaire(s) completed    Pt reports she is allergic to an antibiotic, but does not remember which one.    Reason for visit: Consult    Elizabeth THOMAS

## 2025-03-19 ENCOUNTER — MYC MEDICAL ADVICE (OUTPATIENT)
Dept: OBGYN | Facility: CLINIC | Age: 35
End: 2025-03-19
Payer: COMMERCIAL

## 2025-03-19 NOTE — TELEPHONE ENCOUNTER
I am agreeable with you decreasing dosage down to 5mg. It looks like you recently had a visit with Dr. Gross and virtual visit with Althea Springer and would encourage you to follow their recommendations. There are other medications that can be tried if we suspect your pain is from endometriosis, such as medications like Lupron or Orilissa (these medications essentially put you in a medication induced menopause). These medications are used for very short time periods and then you transition back to Aygestin. If you are interested in further discussing this, I would like to follow up with you to discuss.    RADHA Michele CNP

## 2025-03-19 NOTE — TELEPHONE ENCOUNTER
"Mychart received from patient.   Patient's pelvic pain has not gotten better after increasing dose of Aygestin X 1 month.  Patient is also feeling nauseated.      -3/18/2025 VV with urology for pelvic pain.     Last seen Melly Hill VV 2/19/2025 for pelvic pain     Per OV notes: \"discuss trial of increased dosage of Aygestin 10mg daily to see if any relief is achieved with this. She is agreeable and will start taking 10mg daily to see if there is an improvement. We discussed taking for 1-2 months before assessing how she is doing\".     Referral placed for uro/gyn at 2/19/2025 OV.      Routing to provider for further recommendations or to see if patient should schedule follow up with Melly?     Laxmi COX RN -  OB/[facility]N group          "

## 2025-03-24 ENCOUNTER — PATIENT OUTREACH (OUTPATIENT)
Dept: CARE COORDINATION | Facility: CLINIC | Age: 35
End: 2025-03-24
Payer: COMMERCIAL

## 2025-04-01 ENCOUNTER — OFFICE VISIT (OUTPATIENT)
Dept: UROLOGY | Facility: CLINIC | Age: 35
End: 2025-04-01
Attending: OBSTETRICS & GYNECOLOGY
Payer: COMMERCIAL

## 2025-04-01 VITALS
WEIGHT: 161 LBS | BODY MASS INDEX: 28.52 KG/M2 | DIASTOLIC BLOOD PRESSURE: 76 MMHG | HEART RATE: 86 BPM | SYSTOLIC BLOOD PRESSURE: 117 MMHG

## 2025-04-01 DIAGNOSIS — N94.819 VULVODYNIA: ICD-10-CM

## 2025-04-01 DIAGNOSIS — R39.89 BLADDER PAIN: Primary | ICD-10-CM

## 2025-04-01 PROCEDURE — 1126F AMNT PAIN NOTED NONE PRSNT: CPT | Performed by: OBSTETRICS & GYNECOLOGY

## 2025-04-01 PROCEDURE — 99213 OFFICE O/P EST LOW 20 MIN: CPT | Performed by: OBSTETRICS & GYNECOLOGY

## 2025-04-01 PROCEDURE — 3074F SYST BP LT 130 MM HG: CPT | Performed by: OBSTETRICS & GYNECOLOGY

## 2025-04-01 PROCEDURE — 3078F DIAST BP <80 MM HG: CPT | Performed by: OBSTETRICS & GYNECOLOGY

## 2025-04-01 PROCEDURE — 99214 OFFICE O/P EST MOD 30 MIN: CPT | Performed by: OBSTETRICS & GYNECOLOGY

## 2025-04-01 ASSESSMENT — PAIN SCALES - GENERAL: PAINLEVEL_OUTOF10: NO PAIN (0)
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
## 2025-04-02 ENCOUNTER — OFFICE VISIT (OUTPATIENT)
Dept: PSYCHOLOGY | Facility: CLINIC | Age: 35
End: 2025-04-02
Payer: COMMERCIAL

## 2025-04-02 DIAGNOSIS — Z63.0 PARTNER RELATIONAL PROBLEM: ICD-10-CM

## 2025-04-02 DIAGNOSIS — F41.9 ANXIETY DISORDER, UNSPECIFIED TYPE: Primary | ICD-10-CM

## 2025-04-02 PROCEDURE — 90791 PSYCH DIAGNOSTIC EVALUATION: CPT

## 2025-04-02 SDOH — SOCIAL STABILITY - SOCIAL INSECURITY: PROBLEMS IN RELATIONSHIP WITH SPOUSE OR PARTNER: Z63.0

## 2025-04-05 ENCOUNTER — MYC REFILL (OUTPATIENT)
Dept: OBGYN | Facility: CLINIC | Age: 35
End: 2025-04-05
Payer: COMMERCIAL

## 2025-04-05 DIAGNOSIS — N94.10 DYSPAREUNIA IN FEMALE: ICD-10-CM

## 2025-04-05 DIAGNOSIS — R10.2 PELVIC PAIN IN FEMALE: ICD-10-CM

## 2025-04-07 ENCOUNTER — PATIENT OUTREACH (OUTPATIENT)
Dept: CARE COORDINATION | Facility: CLINIC | Age: 35
End: 2025-04-07
Payer: COMMERCIAL

## 2025-04-07 NOTE — TELEPHONE ENCOUNTER
Requested Prescriptions   Pending Prescriptions Disp Refills    norethindrone (AYGESTIN) 5 MG tablet 90 tablet 0     Sig: Take 1 tablet (5 mg) by mouth daily.       There is no refill protocol information for this order        Pt last seen 2/19/2025 for med recheck    Last prescribed 1/29/2025 for 90 tablets with 0 refills    Pt was taking 2 tablets daily but now back down to 1 tablet daily- so ran out of medication faster    Routing refill request to provider for review/approval because:  Drug not on the [facility] refill protocol
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
## 2025-04-08 RX ORDER — NORETHINDRONE 5 MG/1
5 TABLET ORAL DAILY
Qty: 90 TABLET | Refills: 3 | Status: SHIPPED | OUTPATIENT
Start: 2025-04-08

## 2025-04-10 ENCOUNTER — LAB (OUTPATIENT)
Dept: LAB | Facility: CLINIC | Age: 35
End: 2025-04-10
Payer: COMMERCIAL

## 2025-04-10 DIAGNOSIS — D71 CHRONIC GRANULOMATOUS DISEASE (H): ICD-10-CM

## 2025-04-11 ENCOUNTER — LAB (OUTPATIENT)
Dept: LAB | Facility: CLINIC | Age: 35
End: 2025-04-11
Payer: COMMERCIAL

## 2025-04-11 DIAGNOSIS — D71 CHRONIC GRANULOMATOUS DISEASE (H): Primary | ICD-10-CM

## 2025-04-11 PROCEDURE — G0452 MOLECULAR PATHOLOGY INTERPR: HCPCS | Mod: 26 | Performed by: PATHOLOGY

## 2025-04-11 PROCEDURE — 81479 UNLISTED MOLECULAR PATHOLOGY: CPT

## 2025-04-16 LAB — INTERPRETATION SERPL IEP-IMP: NORMAL

## 2025-04-18 ENCOUNTER — OFFICE VISIT (OUTPATIENT)
Dept: PSYCHOLOGY | Facility: CLINIC | Age: 35
End: 2025-04-18
Payer: COMMERCIAL

## 2025-04-18 DIAGNOSIS — Z63.0 PARTNER RELATIONAL PROBLEM: ICD-10-CM

## 2025-04-18 DIAGNOSIS — F41.9 ANXIETY DISORDER, UNSPECIFIED TYPE: Primary | ICD-10-CM

## 2025-04-18 PROCEDURE — 90837 PSYTX W PT 60 MINUTES: CPT

## 2025-04-18 SDOH — SOCIAL STABILITY - SOCIAL INSECURITY: PROBLEMS IN RELATIONSHIP WITH SPOUSE OR PARTNER: Z63.0

## 2025-04-18 NOTE — PROGRESS NOTES
"  Name:  Stacey Zavala  Mrn: [id number]  Date of visit: 4/2/2025    PSYCHOLOGY OUTPATIENT VISIT NOTE     Patient location: Clinic  Pt Pronouns: she/her  Referral Source: Isabella Gross MD, [facility]    Reviewed limits of confidentiality including that provider is a mandated  for abuse.  All questions answered.      PRESENTING PROBLEMS/SYMPTOMS:  \"I could have OCD...but it's not causing me any problems.\"  \"I carry sadness and anxiety\" associated with relationship history.   \"I get in mind things have to be a certain way\" if not get anxious.  If hold scissors thing of stabbing self or partner, but  self to \"breathe and move on\" then ok.  Worry about something happening to cat, eg nephew accidentally suffocating it so have to then check on cat.  Randomly say cat's name or [id number] and make noises without realizing (partner notes).  Make up rules and if don't follow become anxious, eg have to ride bike every day in April despite rain/snow/sleet; March had to do 30K steps daily, even tho sick and blisters on feet.  Ruminate a lot, check things, review what wrote or said.  One bro diagnosed with OCD and other probably has also.  Partner told her he is  \"convinced I have OCD.\"       Social History:   Born and raised in [place] [place], 2older bros, one still live with parents.  Parents remain  and in [place].    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   Enjoy work; full time.    Relationship - never ; male partner approx 10yrs (has \"severe anxiety and ADHD\"); never pregnant  Housing  - moved to [place] in 2015, own house in [place] with partner and cat  Activities - \"I like being active,\" gardening, backpacking, hiking, biking, weights, walks (despite fatigue)    Family Mental Health: one bro has OCD and another bro not assessed but may have OCD, \"germaphobic\" and doesn't hold " "job, lives with parents.  Dad had alcohol YOEL and stopped drinking when pt in 20s.  Paternal uncle \"a drunk\" and a cousin  of overdose.  Mom has fear of heights and panic attacks.  Maternal aunt has bipolar disorder and her daughter has anxiety disorder.      Medical History:  Accident on trampoline \"caused whiplash\" but no medical tx.  Chronic exhaustion since completing MFA (yr ?), but pushes self to be physically active.  Episodic sacrum pain.  Episodic severe sharp or achy pain in bladder, \"brings me to my knees\" but tried ignoring for 2yrs.  Then chronic diarrhea (now controlled with fiber supplement).  Has sought medical care recently but all GI and gyn test are negative.   Migraines with \"whole body pain\" somewhat improved with taking mini-pill and not having monthly bleeding.       Mental Health History:  In middle school \"watched diet\" and exercised to decrease wt; in highschool raw foods diet and low bubba then passed out, \"never had a great relationship with food.\"   - 3visits to [facility] counselor around relationship problems with partner, unhelpful. Denied hx of psychopharmacotherapy.      Health Behaviors  Alcohol - none (even tiniest bit causes headache and nausea)  Tobacco - none  Caffeine - approx 16oz coffee/day and 0-3 black tea/day  Drugs - 5mg THC selzer 0-2x/month.    Psychological Symptoms:  Depression - Sadness and crying when think about relationship with partner, his disinterest in sexual relationship (chronic)  Anhedonia - denied  Eating - vegan 8-9yrs; ok  Sleeping - \"sleep a lot\" 10pm-6:30, always tired and easy to fall asleep  Guilt - denied  Energy - exhausted but push self to be active  Concentration - zone out a lot, eg when people are talking to me or in a meeting; able to get work done and no negative consequences.    Memory - problems denied  Self esteem - 'horrible-terrible\", \"I always assume I'm wrong\" uncertain cause  Anxiety - yes, social - avoid talking at work or say " "something and worry, ruminate, tight chest.    ANKIT - \"never had a great relationship with food.\" (See history)  SI - denied  HI - denied    ASSESSMENT:  Engaged in visit; future oriented.  Tearful describing difficulties with partner and hx of passing out due to food restrictions in highschool.  Stated she did not want to take psychiatric meds unless \"necessary.\"    Mental Status Assessment:  Appearance:   Appropriate   Eye Contact:   Good   Psychomotor Behavior: Normal  and some pulling/stroking hair during visit  Attitude:   Cooperative   Orientation:   All  Speech   Rate / Production: Normal    Volume:  Normal   Mood:    Anxious  Sad  tearful  Thought Content:  Clear   Thought Form:  Coherent  Logical   Insight:    Fair     DIAGNOSIS:    Axis I:  Unspecified anxiety disorder; Partner relational problem; (r/o depressive disorder, social anxiety disorder, OCD, sleep disorder, pain disorder)  Axis II:  deferred  Axis III:  migraines, chronic pain  Axis IV:  none  Axis V:  GAF current = 51-60    PLAN:    Patient to schedule followup visits to complete symptoms review/differential diagnosis and commence therapy.  Patient to consult with medical provider in the event she decides to be evaluated for psychiatric meds.      Total time spent equals 55 minutes, diagnostic assessment.             "

## 2025-04-19 ENCOUNTER — ANCILLARY PROCEDURE (OUTPATIENT)
Dept: MRI IMAGING | Facility: CLINIC | Age: 35
End: 2025-04-19
Attending: STUDENT IN AN ORGANIZED HEALTH CARE EDUCATION/TRAINING PROGRAM
Payer: COMMERCIAL

## 2025-04-19 DIAGNOSIS — M53.3 SACRAL LESION: ICD-10-CM

## 2025-04-19 PROCEDURE — A9585 GADOBUTROL INJECTION: HCPCS | Performed by: RADIOLOGY

## 2025-04-19 PROCEDURE — 72197 MRI PELVIS W/O & W/DYE: CPT | Performed by: RADIOLOGY

## 2025-04-19 RX ORDER — GADOBUTROL 604.72 MG/ML
7.5 INJECTION INTRAVENOUS ONCE
Status: COMPLETED | OUTPATIENT
Start: 2025-04-19 | End: 2025-04-19

## 2025-04-19 RX ADMIN — GADOBUTROL 7.5 ML: 604.72 INJECTION INTRAVENOUS at 11:29

## 2025-04-21 ENCOUNTER — VIRTUAL VISIT (OUTPATIENT)
Dept: FAMILY MEDICINE | Facility: CLINIC | Age: 35
End: 2025-04-21
Payer: COMMERCIAL

## 2025-04-21 DIAGNOSIS — G56.23 ULNAR NEUROPATHY OF BOTH UPPER EXTREMITIES: ICD-10-CM

## 2025-04-21 DIAGNOSIS — R25.1 TREMOR: ICD-10-CM

## 2025-04-21 DIAGNOSIS — L98.9 SKIN LESION: ICD-10-CM

## 2025-04-21 DIAGNOSIS — R53.82 CHRONIC FATIGUE: Primary | ICD-10-CM

## 2025-04-21 DIAGNOSIS — R25.2 LEG CRAMPS: ICD-10-CM

## 2025-04-21 PROCEDURE — 98006 SYNCH AUDIO-VIDEO EST MOD 30: CPT | Performed by: STUDENT IN AN ORGANIZED HEALTH CARE EDUCATION/TRAINING PROGRAM

## 2025-04-21 NOTE — PROGRESS NOTES
Stacey is a 34 year old who is being evaluated via a billable video visit.    How would you like to obtain your AVS? MyChart  If the video visit is dropped, the invitation should be resent by: Text to cell phone: [phone]  Will anyone else be joining your video visit? No      Assessment & Plan     Chronic fatigue  Endorses chronic fatigue and ability to fall asleep easily when not moving. She sleeps adequate amount nightly. We will refer for sleep medicine evaluation. Possible sleep study. Differential includes narcolepsy  - Adult Sleep Eval & Management  Referral; Future    Tremor  Symptoms sound consistent with essential tremors. Discussed caffeine, stress, lack of sleep can make symptoms worse   - Adult Neurology  Referral; Future    Ulnar neuropathy of both upper extremities  Numbness in pinky fingers at night sound consistent with ulnar neuropathy. No evaluation can  be done today d/t virtual visit but suspect this is the main cause. Due to few neurologic symptoms we will refer to neurology to evaluate/rule out for more concerning etiologies such as MS. Discussed she may need NCS  - Adult Neurology  Referral; Future    Leg cramps  Leg and pelvic cramping. Labs have been reassuring. In association with her other neurologic symptoms would recommend neurology evaluation.   - Adult Neurology  Referral; Future    Skin lesion  Suspected dermatofibroma but difficulty to visualize on video today. Has been present for multiple years and unchanging. She'd like a full skin check so we will refer to derm   - Adult Dermatology  Referral; Future            Subjective   Stacey is a 34 year old, presenting for the following health issues:  Finger      History of Present Illness       Reason for visit:  Shaking, twitching, numbness, muscle cramps  Symptom onset:  More than a month  Symptoms include:  Shaking, numbness, muscle cramps, headache, tiredness, exhaustion  Symptom  intensity:  Mild  Symptom progression:  Staying the same  Had these symptoms before:  Yes  Has tried/received treatment for these symptoms:  No  What makes it worse:  Sleep, rest, work  What makes it better:  Eating, staying active all day   She is taking medications regularly.          Ventura on finger-  Unrelated to her visit today.  Left hand middle finger   Ventura on skin.  Been there a few years. It itches. Raised pink bump. No change in size or color.         Taking Citrucel every day and this is working well. Haven't been having diarrhea as much as long as taking Citrucel daily. Still getting pelvic pain though in front like florencio horse so was referred to uro-gyn. Also endorses florencio hoarses in legs and calves. Also endorse numbness in fingers mostly at night when sleeping, more on pinky finger b/l down to elbow bilaterally, most nights. Endorses both hands shaking with fine motor movements. Occasional headaches, hx of migraines. On mini-pill so doesn't get a period anymore and this has helped migraines. Referred to psychology.  Referred to pelvic floor PT. Endorses extreme tiredness and fatigue, this gets worse the later in the day, struggles staying awake. Sleeping 8 hours. Falls asleep in dentist chair, if sits and rests will fall asleep. Endorses exhaustion. Food intake and water intake has been good. Vegan diet. Monitors protein intake. Some brain fog since having covid infection.       Review of Systems  Constitutional, HEENT, cardiovascular, pulmonary, gi and gu systems are negative, except as otherwise noted.      Objective           Vitals:  No vitals were obtained today due to virtual visit.    Physical Exam   GENERAL: alert and no distress  EYES: Eyes grossly normal to inspection.  No discharge or erythema, or obvious scleral/conjunctival abnormalities.  RESP: No audible wheeze, cough, or visible cyanosis.    SKIN: Visible skin clear. No significant rash, abnormal pigmentation or lesions.  NEURO:  Cranial nerves grossly intact.  Mentation and speech appropriate for age.  PSYCH: Appropriate affect, tone, and pace of words          Video-Visit Details    Type of service:  Video Visit   Originating Location (pt. Location): Home    Distant Location (provider location):  On-site  Platform used for Video Visit: Oleksandr  Signed Electronically by: Dianne Ingram DO

## 2025-04-23 ENCOUNTER — OFFICE VISIT (OUTPATIENT)
Dept: PSYCHOLOGY | Facility: CLINIC | Age: 35
End: 2025-04-23
Payer: COMMERCIAL

## 2025-04-23 DIAGNOSIS — F41.9 ANXIETY DISORDER, UNSPECIFIED TYPE: Primary | ICD-10-CM

## 2025-04-23 DIAGNOSIS — Z63.0 PARTNER RELATIONAL PROBLEM: ICD-10-CM

## 2025-04-23 SDOH — SOCIAL STABILITY - SOCIAL INSECURITY: PROBLEMS IN RELATIONSHIP WITH SPOUSE OR PARTNER: Z63.0

## 2025-04-23 NOTE — PROGRESS NOTES
"  Name:  Stacey Zavala  Mrn: [id number]  Date of visit: 4/23/2025    PSYCHOLOGY OUTPATIENT VISIT NOTE     Treatment plan completed: 4/23/25  Patient location: Clinic  Pt Pronouns: she/her     PRESENTING PROBLEMS/SYMPTOMS:  \"I could have OCD...but it's not causing me any problems.\"  \"I carry sadness and anxiety\" associated with relationship history.   \"I get in mind things have to be a certain way\" if not get anxious.  If hold scissors think of stabbing self or partner, but  self to \"breathe and move on\" then ok.  Worry about something happening to cat, and have to then check on cat.  Randomly say cat's name or sm-mi-hq-fum and make noises without realizing (partner notes).  Worry about dad, that others \"hate\" her.  Make up rules and if don't follow become anxious, eg have to ride bike every day in April despite rain/snow/sleet; March had to do 30K steps daily, even tho sick and blisters on feet.  Ruminate a lot, check things, review what wrote or said and have social anxiety.    Medical - Hx of migraines and chronic diarrhea, currently chronic pain (multiple locations) and fatigue.    Intervention and response:   Tendency not to talk when have emotions.  Often lonely.  Pain continue, today lower right side, perhaps bowel.  Pelvic pain continues, back pain improved with PT.  Plan to resume PT in May.  Hx: I childhood - don't express opinions because mom judgemental; never want to say things others don't want to hear or upset others.  Have written memoir but don't want mom to read.  Hard to talk with Partner about emotions talk couple times per yr or less.  Enjoy shared physical activities and \"like each other.\" Feb 2025 - talked with him about wanting kids and getting '; he doesn't want and uncertain stay with her. Pt feeling \"hurt\" and then P also \"hurt\" because she asked for sex and he agreed because afraid she will \"kick him out.\"  Discussed and supported.  Have friends in [place] and [place] but none local; " "call mom daily; apprehensive to talk with mom (afraid mom will worry and tell me what to do) about this and friends not helpful, encouraged leaving P.  Did ask P to move out once but so scared and sad, changed mind next day. Goals for therapy identified and including around P.    Completed Tx Plan, see EMR.  ASSESSMENT:  Engaged in visit; future oriented.  Tearful describing difficulties with partner and worry about burdening mom with her own concerns.  [Stated she did not want to take psychiatric meds unless \"necessary.\"]  Continuing context:  One bro diagnosed with OCD and other probably has also.  male partner approx 10yrs (has \"severe anxiety and ADHD\"); never pregnant.  Partner told her he is  \"convinced I have OCD.\"  hx of passing out due to self imposed food restrictions in highschool.    Family Mental Health: one bro has OCD and other bro may have OCD, \"germaphobic\" and doesn't hold job, lives with parents.  Dad had alcohol YOEL and stopped drinking when pt in 20s.  Paternal uncle \"a drunk\" and a cousin  of overdose.  Mom has fear of heights and panic attacks.  Maternal aunt has bipolar disorder and her daughter has anxiety disorder.    Mental Health History:  In middle school \"watched diet\" and exercised to decrease wt; in highschool raw foods diet and low bubba then passed out, \"never had a great relationship with food.\"   - 3visits to P counselor around relationship problems with partner, unhelpful. Denied hx of psychopharmacotherapy.    Enjoy being active, gardening, backpacking, hiking, biking, weights, walks   Mental Status Assessment:  Appearance:   Appropriate   Eye Contact:   Good   Psychomotor Behavior: Normal  and hands shaking when crying  Attitude:   Cooperative   Orientation:   All  Speech   Rate / Production: Normal    Volume:  Normal   Mood:    Anxious  Sad  tearful  Thought Content:  Clear   Thought Form:  Coherent  Logical   Insight:    Fair     DIAGNOSIS:  Unspecified anxiety " disorder; Pain Disorder; Partner relational problem; (r/o depressive disorder, social anxiety disorder, OCD, sleep disorder, pain disorder)    PLAN:    Patient to schedule followup visits.  Patient to consult with medical provider in the event she decides to be evaluated for psychiatric meds.      Total time spent equals 58 minutes, psychological visit.

## 2025-04-26 RX ORDER — NORETHINDRONE 5 MG/1
5 TABLET ORAL DAILY
Qty: 90 TABLET | Refills: 1 | OUTPATIENT
Start: 2025-04-26

## 2025-04-29 ENCOUNTER — PATIENT OUTREACH (OUTPATIENT)
Dept: CARE COORDINATION | Facility: CLINIC | Age: 35
End: 2025-04-29
Payer: COMMERCIAL

## 2025-05-02 ENCOUNTER — OFFICE VISIT (OUTPATIENT)
Dept: PSYCHOLOGY | Facility: CLINIC | Age: 35
End: 2025-05-02
Payer: COMMERCIAL

## 2025-05-02 DIAGNOSIS — Z63.0 PARTNER RELATIONAL PROBLEM: ICD-10-CM

## 2025-05-02 DIAGNOSIS — F41.1 GAD (GENERALIZED ANXIETY DISORDER): Primary | ICD-10-CM

## 2025-05-02 DIAGNOSIS — F32.A DEPRESSION, UNSPECIFIED DEPRESSION TYPE: ICD-10-CM

## 2025-05-02 DIAGNOSIS — F45.42 PAIN DISORDER ASSOCIATED WITH PSYCHOLOGICAL AND PHYSICAL FACTORS: ICD-10-CM

## 2025-05-02 SDOH — SOCIAL STABILITY - SOCIAL INSECURITY: PROBLEMS IN RELATIONSHIP WITH SPOUSE OR PARTNER: Z63.0

## 2025-05-02 NOTE — PROGRESS NOTES
"  Name:  Stacey Zavala  Mrn: [id number]  Date of visit: 5/2/2025    PSYCHOLOGY OUTPATIENT VISIT NOTE     Treatment plan completed: 4/23/25  Patient location: Clinic  Pt Pronouns: she/her     PRESENTING PROBLEMS/SYMPTOMS:  \"I could have OCD...but it's not causing me any problems.\"  \"I carry sadness and anxiety\" associated with relationship history.   \"I get in mind things have to be a certain way\" if not get anxious.  If hold scissors think of stabbing self or partner, but  self to \"breathe and move on\" then ok.  Worry about something happening to cat, and have to then check on cat.  Randomly say cat's name or pl-ky-rd-fum and make noises without realizing (partner notes).  Worry about dad, that others \"hate\" her.  Make up rules and if don't follow become anxious, eg have to ride bike every day in April despite rain/snow/sleet; March had to do 30K steps daily, even tho sick and blisters on feet.  Ruminate a lot, check things, review what wrote or said and have social anxiety.    Medical - Hx of migraines and chronic diarrhea, currently chronic pain (multiple locations) and fatigue.  Cruznta (choon-jose) = male cat  Intervention and response:   Psychotherapy, individual.  Provided ed re MF, pt has practiced \"being aware.\"  Excited colleague invited her to [employer].  Also worried re cat, intrusive thought that she \"accidentally smushed him\" with her chair.  Sadness, throat tight, face; my comfort and support.  (Tearful)  Don't express feelings, hold in, clench jaw, push thoughts away, try not to frown, don't cry.  Memory of dad's alcoholism - try to protect dad, when he came home drunk last night, pretend ok, act normal, not upset.  Always scared for dad, dad driving home ddrunk and crashing, killing self or others.  Dad - HTN, hi BP, sleep apnea. Childhood Memory - at grandparents with dad, mom working, adults drinking, \"a lot of drunk people around, zombies stumbling around;\" dad not going to work and drinking " "instead.  MF - rt hand holding left, \"holding in\", legs crossed and balanced on edge of foot; twitchy feet, knees, arms; uncomfortable to sit with sad.  When adults drinking she wandered into yard, nature, watch birds make nests, watch ants.  \"I like to control things; to get anywhere in life I have to control everything, I don't have anyone to help.\"   Provider - I'm here to help (pt able to take in a little bit, a little relief).  Parents loved me.  I want to move faster, uncertainty hard.  Want to fix it.    Ex - allowing feet on floor, lots of work (try pillow on lap) adjust to crossing ankles and hands clasped; when muscles relax, \"want to tense again.\"  Processed pattern of difficulty with relaxation.    (Previous - Tendency not to talk when have emotions.  Often lonely.  Pain continue, today lower right side, perhaps bowel.  Pelvic pain continues, back pain improved with PT.  Plan to resume PT in May.  Hx: childhood - don't express opinions because mom judgemental; never want to say things others don't want to hear or upset others.  Have written memoir but don't want mom to read.  Hard to talk with Partner about emotions talk couple times per yr or less.  Enjoy shared physical activities and \"like each other.\" Feb 2025 - talked with him about wanting kids and getting '; he doesn't want and uncertain stay with her. Pt feeling \"hurt\" and then P also \"hurt\" because she asked for sex and he agreed because afraid she will \"kick him out.\"  Discussed and supported.  Have friends in [place] and [place] but none local; call mom daily; apprehensive to talk with mom (afraid mom will worry and tell me what to do) about this and friends not helpful, encouraged leaving P.  Did ask P to move out once but so scared and sad, changed mind next day.)  ASSESSMENT:  Engaged in visit; future oriented.  Pt recognizing unable to relax for periods of time, tension in body has been \"normal.\"  Body tension likely contributing to pain " "conditions.   [Stated she did not want to take psychiatric meds unless \"necessary.\"]  Continuing context:  One bro diagnosed with OCD and other probably has also.  male partner approx 10yrs (has \"severe anxiety and ADHD\"); never pregnant.  Partner told her he is  \"convinced I have OCD.\"  hx of passing out due to self imposed food restrictions in highschool.    Family Mental Health: one bro has OCD and other bro may have OCD, \"germaphobic\" and doesn't hold job, lives with parents.  Dad had alcohol YOEL and stopped drinking when pt in 20s.  Paternal uncle \"a drunk\" and a cousin  of overdose.  Mom has fear of heights and panic attacks.  Maternal aunt has bipolar disorder and her daughter has anxiety disorder.    Mental Health History:  In middle school \"watched diet\" and exercised to decrease wt; in highschool raw foods diet and low bubba then passed out, \"never had a great relationship with food.\"   - 3visits to P counselor around relationship problems with partner, unhelpful. Denied hx of psychopharmacotherapy.    Enjoy being active, gardening, backpacking, hiking, biking, weights, walks   Mental Status Assessment:  Appearance:   Appropriate   Eye Contact:   Good   Psychomotor Behavior: Normal  upper and lower body appendages crossed  Attitude:   Cooperative   Orientation:   All  Speech   Rate / Production: Normal    Volume:  Normal   Mood:    Anxious  Sad  tearful  Thought Content:  Clear   Thought Form:  Coherent  Logical   Insight:    Fair     DIAGNOSIS:  MARIANO; Depressive disorder unspecified; Pain Disorder; Partner relational problem; (r/o social anxiety disorder, OCD, sleep disorder)    PLAN:    Patient to schedule followup visits.  Patient to consult with medical provider in the event she decides to be evaluated for psychiatric meds.      Total time spent equals 56 minutes, psychological visit.             "

## 2025-05-13 ENCOUNTER — THERAPY VISIT (OUTPATIENT)
Age: 35
End: 2025-05-13
Attending: OBSTETRICS & GYNECOLOGY
Payer: COMMERCIAL

## 2025-05-13 DIAGNOSIS — R39.89 BLADDER PAIN: ICD-10-CM

## 2025-05-13 PROCEDURE — 97110 THERAPEUTIC EXERCISES: CPT | Mod: GP | Performed by: PHYSICAL THERAPIST

## 2025-05-13 PROCEDURE — 97161 PT EVAL LOW COMPLEX 20 MIN: CPT | Mod: GP | Performed by: PHYSICAL THERAPIST

## 2025-05-13 PROCEDURE — 97530 THERAPEUTIC ACTIVITIES: CPT | Mod: GP | Performed by: PHYSICAL THERAPIST

## 2025-05-13 NOTE — PROGRESS NOTES
PHYSICAL THERAPY EVALUATION  Type of Visit: Evaluation     Stacey presents today with pelvic pain. She has been to pelvic floor physical therapy in the past for sacral and abdominal pain. She is having less sacral pain. She continues to have abdominal cramping/lower pelvic pain.  She has done a lot of GI test and everything came back negative. Last year she was having diarrhea for 5 months. She has been on Citrucel  and that has helped her diarrhea.     When she is walking she will feel suprapubic aching. It felt tight there this past weekend.    A month or two ago she coughed while she was urinating and had sharp suprapubic pain . That sharp pain is similar to what she feels when she is hiking.    For a couple weeks she was getting pain that felt deep in her right lower abdomen that was sharp.     Since not having diarrhea her pain in her pelvic/abdominal region has been a little less often. She feels as though she may have random colon spasms that can occur with IBS.     About 3x a week she will have suprapubic achy tightness when walking. The sharp pain in her abdomen is 1x every other week and it lasts no more than 5 seconds before it starts letting.    Fall Risk Screen:  Have you fallen 2 or more times in the past year?: No  Have you fallen and had an injury in the past year?: No    Subjective         Presenting condition or subjective complaint: Muscle spasms in lower abdomen/pelvic region - need pelvic floor relaxation  Date of onset: 04/13/25    Relevant medical history: Migraines or headaches   Dates & types of surgery:      Prior diagnostic imaging/testing results: MRI; CT scan     Prior therapy history for the same diagnosis, illness or injury: Yes End if 2024 for related pelvic floor pain/tighness    Living Environment  Social support: With a significant other or spouse   Type of home: House; Basement   Stairs to enter the home: Yes 4 Is there a railing: Yes     Ramp: No   Stairs inside the home: Yes 12  Is there a railing: Yes     Help at home: None  Equipment owned:       Employment: Yes   Hobbies/Interests: Gardening, cycling, hiking    Patient goals for therapy: Nothing, just less pain     Objective      PELVIC EVALUATION  ADDITIONAL HISTORY:  Sex assigned at birth: Female  Gender identity: Female    Pronouns: She/Her Hers      Bladder History:  Feels bladder filling: No  Triggers for feeling of inability to wait to go to the bathroom: Yes Making decisions  How long can you wait to urinate: 30 minutes  Gets up at night to urinate: Yes 2- she will drink fluid 2 hours before falling asleep  Can stop the flow of urine when urinating: Sometimes  Volume of urine usually released: Average   Other issues:    Number of bladder infections in last 12 months:    Fluid intake per day: 60 ounces 18 ounces    Medications taken for bladder: No     Activities causing urine leak: Sneeze; Hurrying to the bathroom due to a strong urge to urinate (pee)    Amount of urine typically leaked: 1 tbsp up full leakage  Pads used to help with leaking: No      She is wondering if maybe she waits too long and then she suddenly has to void.   At least 1x a day, especially in the morning she will void 30 minutes later, because she feels as though she does not empty.   She feels as though she tenses her core to help empty.    Bowel History:  Frequency of bowel movement: 1-2 per day  Consistency of stool: Soft-formed   bristol stool scale: type 4   Ignores the urge to defecate: No  Other bowel issues: Straining to have bowel movement  Length of time spent trying to have a bowel movement: A few minutes  Citrucel daily  Tumeric daily  Birth control    Sexual Function History:  Sexual orientation: Straight    Sexually active: No  Lubrication used: Yes Yes  Pelvic pain: Walking; Standing; Initial penetration (rectal or vaginal); Deep penetration (rectal or vaginal); Pelvic exams     Pain or difficulty with orgasms/erection/ejaculation: No     State of menopause: Perimenopause (have not gone through menopause yet)  Hormone medications: Yes Norethidrone    Are you currently pregnant: No  Number of previous pregnancies: 0  Number of deliveries: 0  Have you been diagnosed with pelvic prolapse or abdominal separation: No  Do you get regular exercise: Yes  I do this type of exercise: Dumbbell strength training, stretches, walking, hiking, biking, sometimes running  Have you tried pelvic floor strengthening exercises for 4 weeks: Yes  Do you have any history of trauma that is relevant to your care that you d like to share: No      Discussed reason for referral regarding pelvic health needs and external/internal pelvic floor muscle examination with patient/guardian.  Opportunity provided to ask questions and verbal consent for assessment and intervention was given.    POSTURE: WNL  HIP SCREEN:  Strength:   Pain: - none + mild ++ moderate +++ severe  Strength Scale: 0-5/5 Left Right   Hip Flexion 5 5   Hip Extension 5- 5-   Hip Abduction 5 5   Hip Adduction 5 5   Hip Internal Rotation 5 5   Hip External Rotation 5 5      Palpation: tension and tenderness to palpation iliacus, adductors, and glute med bilaterally    PELVIC EXAM  External Visual Inspection:  At rest: Normal  With voluntary pelvic floor contraction: Perineal elevation  Relaxation of PFM: Yes, Partial/delayed relaxation    Integumentary:   Introitus: Unremarkable    External Digital Palpation per Perineum:   Ischiocavernosis: Unremarkable  Bulbo cavernosis: Unremarkable  Transverse perineal: Unremarkable  Levator ani: Tightness, Tenderness    Internal Digital Palpation:  Per Vagina:  Tenderness  Myofascial Resistance to Palpation: Soft, Taut, taut and tender 1-2nd layer, went away after kegels  Digital Muscle Performance: P (Power): 4/5  E (Endurance): 10 seconds  Compensations: n/a  Relaxation Post-Contraction: Normal, Partial/delayed relaxation    ABDOMINAL ASSESSMENT  Diastasis Rectus Abdominis  ():   presence: No    Fascial Tension/Restriction: WNL      Assessment & Plan   CLINICAL IMPRESSIONS  Medical Diagnosis: pelvic pain    Treatment Diagnosis: pelvic pain   Impression/Assessment: Patient is a 34 year old female with pelvic pain complaints.  The following significant findings have been identified: Pain, Decreased ROM/flexibility, Decreased strength, Impaired muscle performance, and Decreased activity tolerance. These impairments interfere with their ability to perform self care tasks, work tasks, recreational activities, household chores, driving , household mobility, and community mobility as compared to previous level of function.     Clinical Decision Making (Complexity):  Clinical Presentation: Stable/Uncomplicated  Clinical Presentation Rationale: based on medical and personal factors listed in PT evaluation  Clinical Decision Making (Complexity): Low complexity    PLAN OF CARE  Treatment Interventions:  Modalities: Biofeedback, Cryotherapy, Cupping, Dry Needling, E-stim, Hot Pack  Interventions: Gait Training, Manual Therapy, Neuromuscular Re-education, Therapeutic Activity, Therapeutic Exercise, Self-Care/Home Management    Long Term Goals     PT Goal 1  Goal Description: Pt will have no more than 1/10 abdominal pain for 1 month.  Rationale: to maximize safety and independence with performance of ADLs and functional tasks  Target Date: 08/05/25      Frequency of Treatment: 1x a week  Duration of Treatment: 12 weeks    Education Assessment:   Learner/Method: Patient    Risks and benefits of evaluation/treatment have been explained.   Patient/Family/caregiver agrees with Plan of Care.     Evaluation Time:     PT Eval, Low Complexity Minutes (74442): 28     Signing Clinician: Melani Puga, PT

## 2025-05-17 ENCOUNTER — HEALTH MAINTENANCE LETTER (OUTPATIENT)
Age: 35
End: 2025-05-17

## 2025-05-20 ENCOUNTER — THERAPY VISIT (OUTPATIENT)
Age: 35
End: 2025-05-20
Attending: OBSTETRICS & GYNECOLOGY
Payer: COMMERCIAL

## 2025-05-20 DIAGNOSIS — R10.2 PELVIC PAIN IN FEMALE: Primary | ICD-10-CM

## 2025-05-20 PROCEDURE — 97530 THERAPEUTIC ACTIVITIES: CPT | Mod: GP | Performed by: PHYSICAL THERAPIST

## 2025-05-20 PROCEDURE — 97112 NEUROMUSCULAR REEDUCATION: CPT | Mod: GP | Performed by: PHYSICAL THERAPIST

## 2025-05-27 ENCOUNTER — THERAPY VISIT (OUTPATIENT)
Age: 35
End: 2025-05-27
Attending: OBSTETRICS & GYNECOLOGY
Payer: COMMERCIAL

## 2025-05-27 DIAGNOSIS — R10.2 PELVIC PAIN IN FEMALE: Primary | ICD-10-CM

## 2025-05-27 PROCEDURE — 97112 NEUROMUSCULAR REEDUCATION: CPT | Mod: GP | Performed by: PHYSICAL THERAPIST

## 2025-05-27 PROCEDURE — 97530 THERAPEUTIC ACTIVITIES: CPT | Mod: GP | Performed by: PHYSICAL THERAPIST

## 2025-05-30 ENCOUNTER — OFFICE VISIT (OUTPATIENT)
Dept: PSYCHOLOGY | Facility: CLINIC | Age: 35
End: 2025-05-30
Payer: COMMERCIAL

## 2025-05-30 DIAGNOSIS — F41.1 GAD (GENERALIZED ANXIETY DISORDER): Primary | ICD-10-CM

## 2025-05-30 DIAGNOSIS — F32.A DEPRESSION, UNSPECIFIED DEPRESSION TYPE: ICD-10-CM

## 2025-05-30 DIAGNOSIS — F45.42 PAIN DISORDER ASSOCIATED WITH PSYCHOLOGICAL AND PHYSICAL FACTORS: ICD-10-CM

## 2025-05-30 NOTE — PROGRESS NOTES
"  Name:  Stacey Zavala  Mrn: [id number]  Date of visit: 5/30/2025    PSYCHOLOGY OUTPATIENT VISIT NOTE     Treatment plan completed: 4/23/25  Patient location: Clinic  Pt Pronouns: she/her     PRESENTING PROBLEMS/SYMPTOMS:  \"I could have OCD...but it's not causing me any problems.\"  \"I carry sadness and anxiety\" associated with relationship history.   \"I get in mind things have to be a certain way\" if not get anxious.  If hold scissors think of stabbing self or partner, but  self to \"breathe and move on\" then ok.  Worry about something happening to cat, and have to then check on cat.  Randomly say cat's name or [id number] and make noises without realizing (partner notes).  Worry about dad, that others \"hate\" her.  Make up rules and if don't follow become anxious, eg have to ride bike every day in April despite rain/snow/sleet; March had to do 30K steps daily, even tho sick and blisters on feet.  Ruminate a lot, check things, review what wrote or said and have social anxiety.    Medical - Hx of migraines and chronic diarrhea, currently chronic pain (multiple locations) and fatigue.  Delroy (jason) = male cat; Partner = Justen  Intervention and response:   Psychotherapy, individual.  Started PF PT, going well.  No longer pain with intercourse, learning to relax entire pelvic area.  Feeling \"sympathy pain\" in glutes, eg J cut himself and she felt pain, pattern \"my whole life.\"  MF - neck tight on L side going into spine and L hip pain, (recalling J cutting self and self going to find bandage) \"I imagine his pain\", feeling pulse pain on L side going into rt, sense of \"something pop out of place, needs pop/alignment, would help for someone to push back into place.  MF with pain moving in body, \"confused me\", in arms, muscles tired and sore all over body.  \"Fear\" in hip and glutes, chronic, worry about dad \"Getting hurt\" , imagine \"bad things happening\", eg if see knife can imagine pain happening and feel " "it.  Whole life fear pain for self and others, worry, sad have had so much fear and impacts.    Mom wanting kids to be \"happy healthy and safe.\" Pt always trying to be happy.  Mom Had lots of fear, afraid to let others watch kids (mom abused as child and ran away at 18yo, went back at 23yo).  Discussed and processed hx and patterns.    Resources - cat, cycling, poetry  (Previous: worry and social anxiety, people don't like me.  The think I'm lying, overemotional, a burden.  Memory - do things and upset mom, her upset is worst punishment (crying).  Pattern - look at people to see their emotions, see what's next.  Tiring, lonely, unsure of self.  Not wanting to burdent them or be judged.  Don't talk in groups.  MF- tight in body and back of throat, head hurts.  Push away sad but when alone feel sad.  As child and teen always looked on bright side; \"at least.....\" )  (Previous - Don't express feelings, hold in, clench jaw, push thoughts away, try not to frown, don't cry.  Memory of dad's alcoholism - try to protect dad, when he came home drunk last night, pretend ok, act normal, not upset.  Always scared for dad, dad driving home ddrunk and crashing, killing self or others.  Dad - HTN, hi BP, sleep apnea. Childhood Memory - at grandparents with dad, mom working, adults drinking, \"a lot of drunk people around, zombies stumbling around;\" dad not going to work and drinking instead.  MF - rt hand holding left, \"holding in\", When adults drinking she wandered into yard, nature, watch birds make nests, watch ants.  \"I like to control things; to get anywhere in life I have to control everything, I don't have anyone to help.\"   Provider - I'm here to help (pt able to take in a little bit, a little relief).  Parents loved me.)  (Previous - Tendency not to talk when have emotions.  Often lonely.  Pain continue, today lower right side, perhaps bowel.  Pelvic pain continues, back pain improved with PT.  Plan to resume PT in May.  " "Hx: childhood - don't express opinions because mom judgemental; never want to say things others don't want to hear or upset others.  Have written memoir but don't want mom to read.  Hard to talk with Partner about emotions talk couple times per yr or less.  Enjoy shared physical activities and \"like each other.\" 2025 - talked with him about wanting kids and getting '; he doesn't want and uncertain stay with her. Pt feeling \"hurt\" and then P also \"hurt\" because she asked for sex and he agreed because afraid she will \"kick him out.\"  Discussed and supported.  Have friends in [place] and [place] but none local; call mom daily; apprehensive to talk with mom (afraid mom will worry and tell me what to do) about this and friends not helpful, encouraged leaving P.  Did ask P to move out once but so scared and sad, changed mind next day.)  ASSESSMENT:  Engaged in visit; future oriented.  Sad, anxious and smiling to hide emotions, pattern: can't be sad.   [Stated she did not want to take psychiatric meds unless \"necessary.\"]  Continuing context:  One bro diagnosed with OCD and other probably has also.  male partner approx 10yrs (has \"severe anxiety and ADHD\"); never pregnant.  Partner told her he is  \"convinced I have OCD.\"  hx of passing out due to self imposed food restrictions in highschool.    Family Mental Health: one bro has OCD and other bro may have OCD, \"germaphobic\" and doesn't hold job, lives with parents.  Dad had alcohol YOEL and stopped drinking when pt in 20s.  Paternal uncle \"a drunk\" and a cousin  of overdose.  Mom has fear of heights and panic attacks.  Maternal aunt has bipolar disorder and her daughter has anxiety disorder.    Mental Health History:  In middle school \"watched diet\" and exercised to decrease wt; in highschool raw foods diet and low bubba then passed out, \"never had a great relationship with food.\"   - [facility]vis[facility] to [facility] counselor around relationship problems with partner, unhelpful. " Denied hx of psychopharmacotherapy.    Enjoy being active, gardening, backpacking, hiking, biking, weights, walks   Mental Status Assessment:  Appearance:   Appropriate   Eye Contact:   Good   Psychomotor Behavior: Normal  upper and lower body appendages crossed (typical)  Attitude:   Cooperative   Orientation:   All  Speech   Rate / Production: Normal    Volume:  Normal   Mood:    Anxious  Sad  tearful  Thought Content:  Clear   Thought Form:  Coherent  Logical   Insight:    Fair     DIAGNOSIS:  MARIANO; Depressive disorder unspecified; Pain Disorder; (r/o social anxiety disorder, OCD, sleep disorder)  Not addressed at today's visit: Partner relational problem;   PLAN:    Patient to schedule followup visits.  Patient to consult with medical provider in the event she decides to be evaluated for psychiatric meds.      Total time spent equals 55minutes, psychological visit.

## 2025-06-04 ENCOUNTER — THERAPY VISIT (OUTPATIENT)
Age: 35
End: 2025-06-04
Payer: COMMERCIAL

## 2025-06-04 DIAGNOSIS — R10.2 PELVIC PAIN IN FEMALE: Primary | ICD-10-CM

## 2025-06-04 PROCEDURE — 97530 THERAPEUTIC ACTIVITIES: CPT | Mod: GP | Performed by: PHYSICAL THERAPIST

## 2025-06-04 PROCEDURE — 97110 THERAPEUTIC EXERCISES: CPT | Mod: GP | Performed by: PHYSICAL THERAPIST

## 2025-06-04 PROCEDURE — 97140 MANUAL THERAPY 1/> REGIONS: CPT | Mod: GP | Performed by: PHYSICAL THERAPIST

## 2025-07-15 ENCOUNTER — THERAPY VISIT (OUTPATIENT)
Age: 35
End: 2025-07-15
Payer: COMMERCIAL

## 2025-07-15 DIAGNOSIS — R10.2 PELVIC PAIN IN FEMALE: Primary | ICD-10-CM

## 2025-07-15 PROCEDURE — 97110 THERAPEUTIC EXERCISES: CPT | Mod: GP | Performed by: PHYSICAL THERAPIST

## 2025-07-15 PROCEDURE — 97530 THERAPEUTIC ACTIVITIES: CPT | Mod: GP | Performed by: PHYSICAL THERAPIST

## 2025-10-08 ENCOUNTER — PRE VISIT (OUTPATIENT)
Dept: NEUROLOGY | Facility: CLINIC | Age: 35
End: 2025-10-08

## (undated) RX ORDER — FENTANYL CITRATE 50 UG/ML
INJECTION, SOLUTION INTRAMUSCULAR; INTRAVENOUS
Status: DISPENSED
Start: 2024-12-11

## (undated) RX ORDER — HYOSCYAMINE SULFATE 0.12 MG/1
TABLET SUBLINGUAL
Status: DISPENSED
Start: 2025-02-07